# Patient Record
Sex: FEMALE | Race: WHITE | Employment: OTHER | URBAN - METROPOLITAN AREA
[De-identification: names, ages, dates, MRNs, and addresses within clinical notes are randomized per-mention and may not be internally consistent; named-entity substitution may affect disease eponyms.]

---

## 2017-01-23 RX ORDER — ATORVASTATIN CALCIUM 20 MG/1
TABLET, FILM COATED ORAL
Qty: 30 TABLET | Refills: 1 | Status: SHIPPED | OUTPATIENT
Start: 2017-01-23 | End: 2017-03-23 | Stop reason: SDUPTHER

## 2017-02-14 ENCOUNTER — TELEPHONE (OUTPATIENT)
Dept: FAMILY MEDICINE CLINIC | Age: 67
End: 2017-02-14

## 2017-02-14 DIAGNOSIS — Z00.00 ANNUAL PHYSICAL EXAM: Primary | ICD-10-CM

## 2017-03-10 DIAGNOSIS — Z00.00 ANNUAL PHYSICAL EXAM: ICD-10-CM

## 2017-03-10 LAB
A/G RATIO: 1.7 (ref 1.1–2.2)
ALBUMIN SERPL-MCNC: 4.3 G/DL (ref 3.4–5)
ALP BLD-CCNC: 78 U/L (ref 40–129)
ALT SERPL-CCNC: 48 U/L (ref 10–40)
ANION GAP SERPL CALCULATED.3IONS-SCNC: 16 MMOL/L (ref 3–16)
AST SERPL-CCNC: 31 U/L (ref 15–37)
BILIRUB SERPL-MCNC: 0.5 MG/DL (ref 0–1)
BILIRUBIN DIRECT: <0.2 MG/DL (ref 0–0.3)
BILIRUBIN, INDIRECT: ABNORMAL MG/DL (ref 0–1)
BUN BLDV-MCNC: 19 MG/DL (ref 7–20)
CALCIUM SERPL-MCNC: 9.9 MG/DL (ref 8.3–10.6)
CHLORIDE BLD-SCNC: 104 MMOL/L (ref 99–110)
CHOLESTEROL, TOTAL: 185 MG/DL (ref 0–199)
CO2: 22 MMOL/L (ref 21–32)
CREAT SERPL-MCNC: 0.8 MG/DL (ref 0.6–1.2)
GFR AFRICAN AMERICAN: >60
GFR NON-AFRICAN AMERICAN: >60
GLOBULIN: 2.5 G/DL
GLUCOSE BLD-MCNC: 87 MG/DL (ref 70–99)
HCT VFR BLD CALC: 45.2 % (ref 36–48)
HDLC SERPL-MCNC: 46 MG/DL (ref 40–60)
HEMOGLOBIN: 14.7 G/DL (ref 12–16)
LDL CHOLESTEROL CALCULATED: 110 MG/DL
MCH RBC QN AUTO: 29.9 PG (ref 26–34)
MCHC RBC AUTO-ENTMCNC: 32.5 G/DL (ref 31–36)
MCV RBC AUTO: 92.1 FL (ref 80–100)
PDW BLD-RTO: 15.3 % (ref 12.4–15.4)
PLATELET # BLD: 197 K/UL (ref 135–450)
PMV BLD AUTO: 10.1 FL (ref 5–10.5)
POTASSIUM SERPL-SCNC: 4.3 MMOL/L (ref 3.5–5.1)
RBC # BLD: 4.91 M/UL (ref 4–5.2)
SODIUM BLD-SCNC: 142 MMOL/L (ref 136–145)
TOTAL PROTEIN: 6.8 G/DL (ref 6.4–8.2)
TRIGL SERPL-MCNC: 145 MG/DL (ref 0–150)
TSH SERPL DL<=0.05 MIU/L-ACNC: 5.33 UIU/ML (ref 0.27–4.2)
VLDLC SERPL CALC-MCNC: 29 MG/DL
WBC # BLD: 7.2 K/UL (ref 4–11)

## 2017-03-23 ENCOUNTER — OFFICE VISIT (OUTPATIENT)
Dept: FAMILY MEDICINE CLINIC | Age: 67
End: 2017-03-23

## 2017-03-23 VITALS
HEIGHT: 64 IN | DIASTOLIC BLOOD PRESSURE: 78 MMHG | HEART RATE: 90 BPM | WEIGHT: 209 LBS | SYSTOLIC BLOOD PRESSURE: 110 MMHG | BODY MASS INDEX: 35.68 KG/M2 | OXYGEN SATURATION: 96 %

## 2017-03-23 DIAGNOSIS — Z00.00 MEDICARE ANNUAL WELLNESS VISIT, INITIAL: Primary | ICD-10-CM

## 2017-03-23 DIAGNOSIS — Z13.9 SCREENING: ICD-10-CM

## 2017-03-23 DIAGNOSIS — Z00.00 ROUTINE GENERAL MEDICAL EXAMINATION AT A HEALTH CARE FACILITY: ICD-10-CM

## 2017-03-23 PROCEDURE — G0402 INITIAL PREVENTIVE EXAM: HCPCS | Performed by: FAMILY MEDICINE

## 2017-03-23 RX ORDER — ZOLPIDEM TARTRATE 5 MG/1
5 TABLET ORAL NIGHTLY PRN
Qty: 30 TABLET | Refills: 2 | Status: SHIPPED | OUTPATIENT
Start: 2017-03-23 | End: 2020-07-09 | Stop reason: SDUPTHER

## 2017-03-23 RX ORDER — MELOXICAM 15 MG/1
15 TABLET ORAL DAILY
Qty: 30 TABLET | Refills: 3 | Status: SHIPPED | OUTPATIENT
Start: 2017-03-23 | End: 2019-01-14 | Stop reason: ALTCHOICE

## 2017-03-23 RX ORDER — ATORVASTATIN CALCIUM 20 MG/1
TABLET, FILM COATED ORAL
Qty: 30 TABLET | Refills: 1 | Status: SHIPPED | OUTPATIENT
Start: 2017-03-23 | End: 2017-05-11 | Stop reason: SDUPTHER

## 2017-03-23 ASSESSMENT — ENCOUNTER SYMPTOMS
CHEST TIGHTNESS: 0
WHEEZING: 0
SHORTNESS OF BREATH: 0
STRIDOR: 0
COUGH: 0
CHOKING: 0

## 2017-03-23 ASSESSMENT — PATIENT HEALTH QUESTIONNAIRE - PHQ9: SUM OF ALL RESPONSES TO PHQ QUESTIONS 1-9: 0

## 2017-03-23 ASSESSMENT — ANXIETY QUESTIONNAIRES: GAD7 TOTAL SCORE: 1

## 2017-03-23 ASSESSMENT — LIFESTYLE VARIABLES: HOW OFTEN DO YOU HAVE A DRINK CONTAINING ALCOHOL: 0

## 2017-05-11 DIAGNOSIS — Z00.00 MEDICARE ANNUAL WELLNESS VISIT, INITIAL: ICD-10-CM

## 2017-05-11 DIAGNOSIS — Z13.9 SCREENING: ICD-10-CM

## 2017-05-11 RX ORDER — ATORVASTATIN CALCIUM 20 MG/1
TABLET, FILM COATED ORAL
Qty: 30 TABLET | Refills: 0 | Status: SHIPPED | OUTPATIENT
Start: 2017-05-11 | End: 2017-06-27 | Stop reason: SDUPTHER

## 2017-06-27 DIAGNOSIS — Z13.9 SCREENING: ICD-10-CM

## 2017-06-27 DIAGNOSIS — Z00.00 MEDICARE ANNUAL WELLNESS VISIT, INITIAL: ICD-10-CM

## 2017-06-28 RX ORDER — ATORVASTATIN CALCIUM 20 MG/1
TABLET, FILM COATED ORAL
Qty: 30 TABLET | Refills: 0 | Status: SHIPPED | OUTPATIENT
Start: 2017-06-28 | End: 2017-08-29 | Stop reason: SDUPTHER

## 2017-06-30 ENCOUNTER — OFFICE VISIT (OUTPATIENT)
Dept: CARDIOLOGY CLINIC | Age: 67
End: 2017-06-30

## 2017-06-30 VITALS
HEIGHT: 64 IN | HEART RATE: 83 BPM | DIASTOLIC BLOOD PRESSURE: 80 MMHG | SYSTOLIC BLOOD PRESSURE: 122 MMHG | WEIGHT: 205 LBS | OXYGEN SATURATION: 96 % | BODY MASS INDEX: 35 KG/M2

## 2017-06-30 DIAGNOSIS — R53.83 OTHER FATIGUE: ICD-10-CM

## 2017-06-30 DIAGNOSIS — R94.31 ABNORMAL EKG: Primary | ICD-10-CM

## 2017-06-30 DIAGNOSIS — R07.9 CHEST PAIN, UNSPECIFIED TYPE: ICD-10-CM

## 2017-06-30 DIAGNOSIS — E78.5 HYPERLIPIDEMIA, UNSPECIFIED HYPERLIPIDEMIA TYPE: ICD-10-CM

## 2017-06-30 PROCEDURE — 93000 ELECTROCARDIOGRAM COMPLETE: CPT | Performed by: INTERNAL MEDICINE

## 2017-06-30 PROCEDURE — 99204 OFFICE O/P NEW MOD 45 MIN: CPT | Performed by: INTERNAL MEDICINE

## 2017-07-27 ENCOUNTER — HOSPITAL ENCOUNTER (OUTPATIENT)
Dept: CARDIOLOGY | Facility: CLINIC | Age: 67
Discharge: HOME | End: 2017-07-27
Attending: INTERNAL MEDICINE | Admitting: INTERNAL MEDICINE

## 2017-07-27 ENCOUNTER — TELEPHONE (OUTPATIENT)
Dept: CARDIOLOGY | Age: 67
End: 2017-07-27

## 2017-07-27 LAB
LV EF: 60 %
LV EF: 83 %
LVEF MODALITY: NORMAL
LVEF MODALITY: NORMAL

## 2017-08-28 DIAGNOSIS — Z00.00 MEDICARE ANNUAL WELLNESS VISIT, INITIAL: ICD-10-CM

## 2017-08-29 RX ORDER — ATORVASTATIN CALCIUM 20 MG/1
TABLET, FILM COATED ORAL
Qty: 30 TABLET | Refills: 0 | Status: SHIPPED | OUTPATIENT
Start: 2017-08-29 | End: 2017-09-22 | Stop reason: SDUPTHER

## 2017-09-22 DIAGNOSIS — Z00.00 MEDICARE ANNUAL WELLNESS VISIT, INITIAL: ICD-10-CM

## 2017-09-22 RX ORDER — ATORVASTATIN CALCIUM 20 MG/1
TABLET, FILM COATED ORAL
Qty: 30 TABLET | Refills: 0 | Status: SHIPPED | OUTPATIENT
Start: 2017-09-22 | End: 2017-10-20 | Stop reason: SDUPTHER

## 2017-10-20 DIAGNOSIS — Z00.00 MEDICARE ANNUAL WELLNESS VISIT, INITIAL: ICD-10-CM

## 2017-10-20 RX ORDER — ATORVASTATIN CALCIUM 20 MG/1
TABLET, FILM COATED ORAL
Qty: 30 TABLET | Refills: 0 | Status: SHIPPED | OUTPATIENT
Start: 2017-10-20 | End: 2017-11-30 | Stop reason: SDUPTHER

## 2017-11-30 DIAGNOSIS — Z00.00 MEDICARE ANNUAL WELLNESS VISIT, INITIAL: ICD-10-CM

## 2017-11-30 RX ORDER — ATORVASTATIN CALCIUM 20 MG/1
TABLET, FILM COATED ORAL
Qty: 30 TABLET | Refills: 0 | Status: SHIPPED | OUTPATIENT
Start: 2017-11-30 | End: 2017-12-26 | Stop reason: SDUPTHER

## 2017-12-26 DIAGNOSIS — Z00.00 MEDICARE ANNUAL WELLNESS VISIT, INITIAL: ICD-10-CM

## 2017-12-27 RX ORDER — ATORVASTATIN CALCIUM 20 MG/1
TABLET, FILM COATED ORAL
Qty: 30 TABLET | Refills: 0 | Status: SHIPPED | OUTPATIENT
Start: 2017-12-27 | End: 2018-04-03 | Stop reason: SDUPTHER

## 2018-02-19 ENCOUNTER — TELEPHONE (OUTPATIENT)
Dept: FAMILY MEDICINE CLINIC | Age: 68
End: 2018-02-19

## 2018-02-19 DIAGNOSIS — Z00.00 MEDICARE ANNUAL WELLNESS VISIT, SUBSEQUENT: Primary | ICD-10-CM

## 2018-03-27 DIAGNOSIS — Z00.00 MEDICARE ANNUAL WELLNESS VISIT, SUBSEQUENT: ICD-10-CM

## 2018-03-27 LAB
A/G RATIO: 1.8 (ref 1.1–2.2)
ALBUMIN SERPL-MCNC: 4.4 G/DL (ref 3.4–5)
ALP BLD-CCNC: 76 U/L (ref 40–129)
ALT SERPL-CCNC: 47 U/L (ref 10–40)
ANION GAP SERPL CALCULATED.3IONS-SCNC: 13 MMOL/L (ref 3–16)
AST SERPL-CCNC: 33 U/L (ref 15–37)
BILIRUB SERPL-MCNC: 0.5 MG/DL (ref 0–1)
BILIRUBIN DIRECT: <0.2 MG/DL (ref 0–0.3)
BILIRUBIN, INDIRECT: ABNORMAL MG/DL (ref 0–1)
BUN BLDV-MCNC: 15 MG/DL (ref 7–20)
CALCIUM SERPL-MCNC: 9.2 MG/DL (ref 8.3–10.6)
CHLORIDE BLD-SCNC: 103 MMOL/L (ref 99–110)
CHOLESTEROL, TOTAL: 182 MG/DL (ref 0–199)
CO2: 25 MMOL/L (ref 21–32)
CREAT SERPL-MCNC: 0.7 MG/DL (ref 0.6–1.2)
GFR AFRICAN AMERICAN: >60
GFR NON-AFRICAN AMERICAN: >60
GLOBULIN: 2.4 G/DL
GLUCOSE BLD-MCNC: 91 MG/DL (ref 70–99)
HCT VFR BLD CALC: 45 % (ref 36–48)
HDLC SERPL-MCNC: 52 MG/DL (ref 40–60)
HEMOGLOBIN: 15.1 G/DL (ref 12–16)
LDL CHOLESTEROL CALCULATED: 107 MG/DL
MCH RBC QN AUTO: 30.4 PG (ref 26–34)
MCHC RBC AUTO-ENTMCNC: 33.5 G/DL (ref 31–36)
MCV RBC AUTO: 90.9 FL (ref 80–100)
PDW BLD-RTO: 14.2 % (ref 12.4–15.4)
PLATELET # BLD: 199 K/UL (ref 135–450)
PMV BLD AUTO: 10.9 FL (ref 5–10.5)
POTASSIUM SERPL-SCNC: 4.5 MMOL/L (ref 3.5–5.1)
RBC # BLD: 4.95 M/UL (ref 4–5.2)
SODIUM BLD-SCNC: 141 MMOL/L (ref 136–145)
TOTAL PROTEIN: 6.8 G/DL (ref 6.4–8.2)
TRIGL SERPL-MCNC: 115 MG/DL (ref 0–150)
TSH SERPL DL<=0.05 MIU/L-ACNC: 5.84 UIU/ML (ref 0.27–4.2)
VLDLC SERPL CALC-MCNC: 23 MG/DL
WBC # BLD: 7.2 K/UL (ref 4–11)

## 2018-04-03 ENCOUNTER — OFFICE VISIT (OUTPATIENT)
Dept: FAMILY MEDICINE CLINIC | Age: 68
End: 2018-04-03

## 2018-04-03 VITALS
BODY MASS INDEX: 33.8 KG/M2 | OXYGEN SATURATION: 95 % | HEART RATE: 104 BPM | SYSTOLIC BLOOD PRESSURE: 118 MMHG | DIASTOLIC BLOOD PRESSURE: 78 MMHG | HEIGHT: 64 IN | WEIGHT: 198 LBS

## 2018-04-03 DIAGNOSIS — Z12.11 SCREENING FOR COLON CANCER: Primary | ICD-10-CM

## 2018-04-03 DIAGNOSIS — Z00.00 MEDICARE ANNUAL WELLNESS VISIT, INITIAL: ICD-10-CM

## 2018-04-03 DIAGNOSIS — Z00.00 ROUTINE GENERAL MEDICAL EXAMINATION AT A HEALTH CARE FACILITY: ICD-10-CM

## 2018-04-03 PROCEDURE — 4040F PNEUMOC VAC/ADMIN/RCVD: CPT | Performed by: FAMILY MEDICINE

## 2018-04-03 PROCEDURE — G0438 PPPS, INITIAL VISIT: HCPCS | Performed by: FAMILY MEDICINE

## 2018-04-03 RX ORDER — CHLORAL HYDRATE 500 MG
1000 CAPSULE ORAL 2 TIMES DAILY
COMMUNITY
End: 2020-01-07

## 2018-04-03 RX ORDER — ATORVASTATIN CALCIUM 20 MG/1
TABLET, FILM COATED ORAL
Qty: 90 TABLET | Refills: 2 | Status: SHIPPED | OUTPATIENT
Start: 2018-04-03 | End: 2018-12-25 | Stop reason: SDUPTHER

## 2018-04-03 ASSESSMENT — ANXIETY QUESTIONNAIRES: GAD7 TOTAL SCORE: 0

## 2018-04-03 ASSESSMENT — PATIENT HEALTH QUESTIONNAIRE - PHQ9: SUM OF ALL RESPONSES TO PHQ QUESTIONS 1-9: 0

## 2018-04-03 ASSESSMENT — LIFESTYLE VARIABLES: HOW OFTEN DO YOU HAVE A DRINK CONTAINING ALCOHOL: 0

## 2018-07-24 ENCOUNTER — TELEPHONE (OUTPATIENT)
Dept: FAMILY MEDICINE CLINIC | Age: 68
End: 2018-07-24

## 2018-07-25 ENCOUNTER — TELEPHONE (OUTPATIENT)
Dept: FAMILY MEDICINE CLINIC | Age: 68
End: 2018-07-25

## 2018-07-25 DIAGNOSIS — H43.399 VITREOUS FLOATERS, UNSPECIFIED LATERALITY: Primary | ICD-10-CM

## 2018-07-25 NOTE — TELEPHONE ENCOUNTER
Dr. Alexander Client is out of network. Need a different doctor that is in network.   [address] [facility]  Reference # [id number]    Mai Doshi  [phone]  Dr. Ammon Goodman  [phone]  Dr. Jelly Angulo [phone]

## 2018-07-26 NOTE — TELEPHONE ENCOUNTER
The patients insurance company will not pay for her to see Dr Dominik Mclaughlin because he is out of network. They will cover the other three doctors that are listed. Which one would you like patient to see?

## 2018-07-30 NOTE — TELEPHONE ENCOUNTER
I talked to Dr. Clinton Dears office with [facility]. Sofía Zaldivar has already been seen by Dr. Alyssa Meraz and according to their office she was all up to date as far as payment is concerned. She does have an appointment with him on 8/1/18 to have her eyes dilated. I let their office know that we tried to do a referral for that doctor with her insurance, and according to them Dr. Alyssa Meraz is out of network. The office told me that they will contact the patient.

## 2018-09-26 PROBLEM — Z00.00 MEDICARE ANNUAL WELLNESS VISIT, INITIAL: Status: RESOLVED | Noted: 2017-03-23 | Resolved: 2018-09-26

## 2018-12-25 DIAGNOSIS — Z00.00 MEDICARE ANNUAL WELLNESS VISIT, INITIAL: ICD-10-CM

## 2018-12-26 RX ORDER — ATORVASTATIN CALCIUM 20 MG/1
TABLET, FILM COATED ORAL
Qty: 90 TABLET | Refills: 1 | Status: SHIPPED | OUTPATIENT
Start: 2018-12-26 | End: 2019-03-25 | Stop reason: SDUPTHER

## 2019-01-14 ENCOUNTER — OFFICE VISIT (OUTPATIENT)
Dept: FAMILY MEDICINE CLINIC | Age: 69
End: 2019-01-14
Payer: MEDICARE

## 2019-01-14 VITALS
OXYGEN SATURATION: 97 % | DIASTOLIC BLOOD PRESSURE: 86 MMHG | BODY MASS INDEX: 30.9 KG/M2 | HEIGHT: 64 IN | SYSTOLIC BLOOD PRESSURE: 124 MMHG | HEART RATE: 103 BPM | WEIGHT: 181 LBS

## 2019-01-14 DIAGNOSIS — J40 BRONCHITIS: Primary | ICD-10-CM

## 2019-01-14 DIAGNOSIS — H61.23 BILATERAL IMPACTED CERUMEN: ICD-10-CM

## 2019-01-14 PROCEDURE — 99213 OFFICE O/P EST LOW 20 MIN: CPT | Performed by: NURSE PRACTITIONER

## 2019-01-14 RX ORDER — BENZONATATE 100 MG/1
100 CAPSULE ORAL 3 TIMES DAILY PRN
Qty: 30 CAPSULE | Refills: 0 | Status: SHIPPED | OUTPATIENT
Start: 2019-01-14 | End: 2019-01-21

## 2019-01-14 RX ORDER — METHYLPREDNISOLONE 4 MG/1
TABLET ORAL
Qty: 1 KIT | Refills: 0 | Status: SHIPPED | OUTPATIENT
Start: 2019-01-14 | End: 2019-01-14 | Stop reason: SDUPTHER

## 2019-01-14 RX ORDER — AZITHROMYCIN 250 MG/1
250 TABLET, FILM COATED ORAL DAILY
Qty: 6 TABLET | Refills: 0 | Status: SHIPPED | OUTPATIENT
Start: 2019-01-14 | End: 2019-03-11

## 2019-01-14 RX ORDER — METHYLPREDNISOLONE 4 MG/1
TABLET ORAL
Qty: 1 KIT | Refills: 0 | Status: SHIPPED | OUTPATIENT
Start: 2019-01-14 | End: 2019-03-11

## 2019-01-14 ASSESSMENT — ENCOUNTER SYMPTOMS
APNEA: 0
CHEST TIGHTNESS: 0
CHOKING: 0
SINUS PAIN: 1
RHINORRHEA: 1
COUGH: 1
SHORTNESS OF BREATH: 1
WHEEZING: 0
STRIDOR: 0

## 2019-01-23 ENCOUNTER — ANESTHESIA EVENT (OUTPATIENT)
Dept: ENDOSCOPY | Age: 69
End: 2019-01-23
Payer: MEDICARE

## 2019-01-24 ENCOUNTER — ANESTHESIA (OUTPATIENT)
Dept: ENDOSCOPY | Age: 69
End: 2019-01-24
Payer: MEDICARE

## 2019-01-24 ENCOUNTER — HOSPITAL ENCOUNTER (OUTPATIENT)
Age: 69
Setting detail: OUTPATIENT SURGERY
Discharge: HOME | End: 2019-01-24
Attending: INTERNAL MEDICINE | Admitting: INTERNAL MEDICINE
Payer: MEDICARE

## 2019-01-24 VITALS
RESPIRATION RATE: 16 BRPM | OXYGEN SATURATION: 98 % | WEIGHT: 191 LBS | BODY MASS INDEX: 32.61 KG/M2 | SYSTOLIC BLOOD PRESSURE: 124 MMHG | HEART RATE: 82 BPM | TEMPERATURE: 97.4 F | HEIGHT: 64 IN | DIASTOLIC BLOOD PRESSURE: 74 MMHG

## 2019-01-24 VITALS
RESPIRATION RATE: 25 BRPM | DIASTOLIC BLOOD PRESSURE: 84 MMHG | OXYGEN SATURATION: 94 % | SYSTOLIC BLOOD PRESSURE: 124 MMHG

## 2019-01-24 PROCEDURE — 2500000003 HC RX 250 WO HCPCS: Performed by: NURSE ANESTHETIST, CERTIFIED REGISTERED

## 2019-01-24 PROCEDURE — 3700000001 HC ADD 15 MINUTES (ANESTHESIA): Performed by: INTERNAL MEDICINE

## 2019-01-24 PROCEDURE — 6360000002 HC RX W HCPCS: Performed by: NURSE ANESTHETIST, CERTIFIED REGISTERED

## 2019-01-24 PROCEDURE — 3700000000 HC ANESTHESIA ATTENDED CARE: Performed by: INTERNAL MEDICINE

## 2019-01-24 PROCEDURE — 7100000010 HC PHASE II RECOVERY - FIRST 15 MIN: Performed by: INTERNAL MEDICINE

## 2019-01-24 PROCEDURE — 2580000003 HC RX 258: Performed by: INTERNAL MEDICINE

## 2019-01-24 PROCEDURE — 3609027000 HC COLONOSCOPY: Performed by: INTERNAL MEDICINE

## 2019-01-24 PROCEDURE — 7100000011 HC PHASE II RECOVERY - ADDTL 15 MIN: Performed by: INTERNAL MEDICINE

## 2019-01-24 PROCEDURE — 2709999900 HC NON-CHARGEABLE SUPPLY: Performed by: INTERNAL MEDICINE

## 2019-01-24 RX ORDER — LIDOCAINE HYDROCHLORIDE 20 MG/ML
INJECTION, SOLUTION INFILTRATION; PERINEURAL PRN
Status: DISCONTINUED | OUTPATIENT
Start: 2019-01-24 | End: 2019-01-24 | Stop reason: SDUPTHER

## 2019-01-24 RX ORDER — PROPOFOL 10 MG/ML
INJECTION, EMULSION INTRAVENOUS PRN
Status: DISCONTINUED | OUTPATIENT
Start: 2019-01-24 | End: 2019-01-24 | Stop reason: SDUPTHER

## 2019-01-24 RX ORDER — SODIUM CHLORIDE, SODIUM LACTATE, POTASSIUM CHLORIDE, CALCIUM CHLORIDE 600; 310; 30; 20 MG/100ML; MG/100ML; MG/100ML; MG/100ML
INJECTION, SOLUTION INTRAVENOUS CONTINUOUS
Status: DISCONTINUED | OUTPATIENT
Start: 2019-01-24 | End: 2019-01-24 | Stop reason: HOSPADM

## 2019-01-24 RX ADMIN — PROPOFOL 250 MG: 10 INJECTION, EMULSION INTRAVENOUS at 09:22

## 2019-01-24 RX ADMIN — SODIUM CHLORIDE, POTASSIUM CHLORIDE, SODIUM LACTATE AND CALCIUM CHLORIDE: 600; 310; 30; 20 INJECTION, SOLUTION INTRAVENOUS at 09:20

## 2019-01-24 RX ADMIN — LIDOCAINE HYDROCHLORIDE 40 MG: 20 INJECTION, SOLUTION INFILTRATION; PERINEURAL at 09:22

## 2019-01-24 ASSESSMENT — PAIN - FUNCTIONAL ASSESSMENT: PAIN_FUNCTIONAL_ASSESSMENT: 0-10

## 2019-02-12 ENCOUNTER — HOSPITAL ENCOUNTER (OUTPATIENT)
Dept: WOMENS IMAGING | Age: 69
Discharge: HOME | End: 2019-02-12
Payer: MEDICARE

## 2019-02-12 DIAGNOSIS — Z12.31 ENCOUNTER FOR SCREENING MAMMOGRAM FOR MALIGNANT NEOPLASM OF BREAST: ICD-10-CM

## 2019-02-12 PROCEDURE — 77067 SCR MAMMO BI INCL CAD: CPT

## 2019-02-27 ENCOUNTER — TELEPHONE (OUTPATIENT)
Dept: FAMILY MEDICINE CLINIC | Age: 69
End: 2019-02-27

## 2019-02-27 DIAGNOSIS — E78.5 HYPERLIPIDEMIA, UNSPECIFIED HYPERLIPIDEMIA TYPE: Primary | ICD-10-CM

## 2019-03-11 ENCOUNTER — OFFICE VISIT (OUTPATIENT)
Dept: FAMILY MEDICINE CLINIC | Age: 69
End: 2019-03-11
Payer: MEDICARE

## 2019-03-11 VITALS
BODY MASS INDEX: 33.46 KG/M2 | TEMPERATURE: 98.9 F | HEIGHT: 64 IN | SYSTOLIC BLOOD PRESSURE: 134 MMHG | HEART RATE: 116 BPM | OXYGEN SATURATION: 96 % | WEIGHT: 196 LBS | DIASTOLIC BLOOD PRESSURE: 86 MMHG

## 2019-03-11 DIAGNOSIS — J06.9 VIRAL URI: Primary | ICD-10-CM

## 2019-03-11 PROCEDURE — 99213 OFFICE O/P EST LOW 20 MIN: CPT | Performed by: NURSE PRACTITIONER

## 2019-03-11 PROCEDURE — 1036F TOBACCO NON-USER: CPT | Performed by: NURSE PRACTITIONER

## 2019-03-11 PROCEDURE — G8484 FLU IMMUNIZE NO ADMIN: HCPCS | Performed by: NURSE PRACTITIONER

## 2019-03-11 PROCEDURE — 1101F PT FALLS ASSESS-DOCD LE1/YR: CPT | Performed by: NURSE PRACTITIONER

## 2019-03-11 PROCEDURE — G8417 CALC BMI ABV UP PARAM F/U: HCPCS | Performed by: NURSE PRACTITIONER

## 2019-03-11 PROCEDURE — 3017F COLORECTAL CA SCREEN DOC REV: CPT | Performed by: NURSE PRACTITIONER

## 2019-03-11 PROCEDURE — 1090F PRES/ABSN URINE INCON ASSESS: CPT | Performed by: NURSE PRACTITIONER

## 2019-03-11 PROCEDURE — 4040F PNEUMOC VAC/ADMIN/RCVD: CPT | Performed by: NURSE PRACTITIONER

## 2019-03-11 PROCEDURE — 1123F ACP DISCUSS/DSCN MKR DOCD: CPT | Performed by: NURSE PRACTITIONER

## 2019-03-11 PROCEDURE — G8399 PT W/DXA RESULTS DOCUMENT: HCPCS | Performed by: NURSE PRACTITIONER

## 2019-03-11 PROCEDURE — G8427 DOCREV CUR MEDS BY ELIG CLIN: HCPCS | Performed by: NURSE PRACTITIONER

## 2019-03-11 RX ORDER — BENZONATATE 100 MG/1
100 CAPSULE ORAL 3 TIMES DAILY PRN
Qty: 30 CAPSULE | Refills: 0 | Status: SHIPPED | OUTPATIENT
Start: 2019-03-11 | End: 2019-03-18

## 2019-03-11 ASSESSMENT — ENCOUNTER SYMPTOMS
COUGH: 1
NAUSEA: 0
STRIDOR: 0
SINUS PRESSURE: 1
CHEST TIGHTNESS: 1
SHORTNESS OF BREATH: 0
RHINORRHEA: 0
WHEEZING: 0
DIARRHEA: 0
VOMITING: 0
SINUS PAIN: 1
SORE THROAT: 1
TROUBLE SWALLOWING: 0

## 2019-03-11 ASSESSMENT — PATIENT HEALTH QUESTIONNAIRE - PHQ9
SUM OF ALL RESPONSES TO PHQ QUESTIONS 1-9: 0
SUM OF ALL RESPONSES TO PHQ9 QUESTIONS 1 & 2: 0
SUM OF ALL RESPONSES TO PHQ QUESTIONS 1-9: 0
2. FEELING DOWN, DEPRESSED OR HOPELESS: 0
1. LITTLE INTEREST OR PLEASURE IN DOING THINGS: 0

## 2019-03-25 DIAGNOSIS — Z00.00 MEDICARE ANNUAL WELLNESS VISIT, INITIAL: ICD-10-CM

## 2019-03-25 RX ORDER — ATORVASTATIN CALCIUM 20 MG/1
TABLET, FILM COATED ORAL
Qty: 90 TABLET | Refills: 1 | Status: SHIPPED | OUTPATIENT
Start: 2019-03-25 | End: 2019-04-04 | Stop reason: SDUPTHER

## 2019-04-03 DIAGNOSIS — E78.5 HYPERLIPIDEMIA, UNSPECIFIED HYPERLIPIDEMIA TYPE: ICD-10-CM

## 2019-04-03 DIAGNOSIS — R89.9 ABNORMAL LABORATORY TEST RESULT: Primary | ICD-10-CM

## 2019-04-03 LAB
A/G RATIO: 1.7 (ref 1.1–2.2)
ALBUMIN SERPL-MCNC: 4.5 G/DL (ref 3.4–5)
ALP BLD-CCNC: 80 U/L (ref 40–129)
ALT SERPL-CCNC: 71 U/L (ref 10–40)
ANION GAP SERPL CALCULATED.3IONS-SCNC: 13 MMOL/L (ref 3–16)
AST SERPL-CCNC: 45 U/L (ref 15–37)
BILIRUB SERPL-MCNC: 0.5 MG/DL (ref 0–1)
BILIRUBIN DIRECT: <0.2 MG/DL (ref 0–0.3)
BILIRUBIN, INDIRECT: ABNORMAL MG/DL (ref 0–1)
BUN BLDV-MCNC: 16 MG/DL (ref 7–20)
CALCIUM SERPL-MCNC: 9.7 MG/DL (ref 8.3–10.6)
CHLORIDE BLD-SCNC: 107 MMOL/L (ref 99–110)
CHOLESTEROL, TOTAL: 170 MG/DL (ref 0–199)
CO2: 24 MMOL/L (ref 21–32)
CREAT SERPL-MCNC: 0.7 MG/DL (ref 0.6–1.2)
GFR AFRICAN AMERICAN: >60
GFR NON-AFRICAN AMERICAN: >60
GLOBULIN: 2.6 G/DL
GLUCOSE BLD-MCNC: 102 MG/DL (ref 70–99)
HCT VFR BLD CALC: 44.1 % (ref 36–48)
HDLC SERPL-MCNC: 43 MG/DL (ref 40–60)
HEMOGLOBIN: 14.6 G/DL (ref 12–16)
LDL CHOLESTEROL CALCULATED: 106 MG/DL
MCH RBC QN AUTO: 30.3 PG (ref 26–34)
MCHC RBC AUTO-ENTMCNC: 33.1 G/DL (ref 31–36)
MCV RBC AUTO: 91.3 FL (ref 80–100)
PDW BLD-RTO: 14.3 % (ref 12.4–15.4)
PLATELET # BLD: 198 K/UL (ref 135–450)
PMV BLD AUTO: 10.3 FL (ref 5–10.5)
POTASSIUM SERPL-SCNC: 4.5 MMOL/L (ref 3.5–5.1)
RBC # BLD: 4.83 M/UL (ref 4–5.2)
SODIUM BLD-SCNC: 144 MMOL/L (ref 136–145)
TOTAL PROTEIN: 7.1 G/DL (ref 6.4–8.2)
TRIGL SERPL-MCNC: 107 MG/DL (ref 0–150)
TSH SERPL DL<=0.05 MIU/L-ACNC: 4.83 UIU/ML (ref 0.27–4.2)
VLDLC SERPL CALC-MCNC: 21 MG/DL
WBC # BLD: 5.3 K/UL (ref 4–11)

## 2019-04-04 ENCOUNTER — OFFICE VISIT (OUTPATIENT)
Dept: FAMILY MEDICINE CLINIC | Age: 69
End: 2019-04-04
Payer: MEDICARE

## 2019-04-04 VITALS
HEART RATE: 94 BPM | BODY MASS INDEX: 34.15 KG/M2 | DIASTOLIC BLOOD PRESSURE: 86 MMHG | HEIGHT: 64 IN | OXYGEN SATURATION: 94 % | WEIGHT: 200 LBS | SYSTOLIC BLOOD PRESSURE: 136 MMHG

## 2019-04-04 DIAGNOSIS — R74.8 ELEVATED LIVER ENZYMES: ICD-10-CM

## 2019-04-04 DIAGNOSIS — R10.32 LEFT LOWER QUADRANT PAIN: ICD-10-CM

## 2019-04-04 DIAGNOSIS — Z00.00 MEDICARE ANNUAL WELLNESS VISIT, INITIAL: Primary | ICD-10-CM

## 2019-04-04 DIAGNOSIS — E78.5 HYPERLIPIDEMIA, UNSPECIFIED HYPERLIPIDEMIA TYPE: ICD-10-CM

## 2019-04-04 PROCEDURE — G0439 PPPS, SUBSEQ VISIT: HCPCS | Performed by: FAMILY MEDICINE

## 2019-04-04 PROCEDURE — 4040F PNEUMOC VAC/ADMIN/RCVD: CPT | Performed by: FAMILY MEDICINE

## 2019-04-04 RX ORDER — ATORVASTATIN CALCIUM 20 MG/1
TABLET, FILM COATED ORAL
Qty: 90 TABLET | Refills: 1 | Status: SHIPPED | OUTPATIENT
Start: 2019-04-04 | End: 2020-12-21 | Stop reason: CLARIF

## 2019-04-04 ASSESSMENT — ANXIETY QUESTIONNAIRES: GAD7 TOTAL SCORE: 0

## 2019-04-04 ASSESSMENT — ENCOUNTER SYMPTOMS
ABDOMINAL PAIN: 1
WHEEZING: 0
SHORTNESS OF BREATH: 0
CHEST TIGHTNESS: 0
CHOKING: 0
COUGH: 0
STRIDOR: 0
BACK PAIN: 0

## 2019-04-04 ASSESSMENT — PATIENT HEALTH QUESTIONNAIRE - PHQ9
SUM OF ALL RESPONSES TO PHQ QUESTIONS 1-9: 0
1. LITTLE INTEREST OR PLEASURE IN DOING THINGS: 0
2. FEELING DOWN, DEPRESSED OR HOPELESS: 0
SUM OF ALL RESPONSES TO PHQ QUESTIONS 1-9: 0
SUM OF ALL RESPONSES TO PHQ9 QUESTIONS 1 & 2: 0
DEPRESSION UNABLE TO ASSESS: PT REFUSES

## 2019-04-04 ASSESSMENT — LIFESTYLE VARIABLES: HOW OFTEN DO YOU HAVE A DRINK CONTAINING ALCOHOL: 0

## 2019-04-04 NOTE — PROGRESS NOTES
exhibits no distension and no mass. There is no tenderness. There is no rebound and no guarding. Musculoskeletal: Normal range of motion. She exhibits no edema or tenderness. Lymphadenopathy:     She has no cervical adenopathy. Neurological: She is alert and oriented to person, place, and time. She has normal reflexes. She displays normal reflexes. No cranial nerve deficit. She exhibits normal muscle tone. Coordination normal.   Skin: Skin is warm and dry. No rash noted. No erythema. No pallor. Psychiatric: She has a normal mood and affect. Nursing note and vitals reviewed. Assessment/Plan      1. Medicare annual wellness visit, initial    - atorvastatin (LIPITOR) 20 MG tablet; Take 1 qd  Dispense: 90 tablet; Refill: 1    2. Hyperlipidemia, unspecified hyperlipidemia type-continue Lipitor      3. Elevated liver enzymes-repeat in one month    - Hepatic Function Panel; Future    4. Left lower quadrant pain-ordered CT of abdomen and pelvis    - CT ABDOMEN PELVIS W WO CONTRAST;  Future        Flemarbella Singer, DO

## 2019-04-16 ENCOUNTER — HOSPITAL ENCOUNTER (OUTPATIENT)
Dept: CT IMAGING | Age: 69
Discharge: HOME | End: 2019-04-16
Payer: MEDICARE

## 2019-04-16 ENCOUNTER — HOSPITAL ENCOUNTER (OUTPATIENT)
Age: 69
Discharge: HOME | End: 2019-04-16
Payer: MEDICARE

## 2019-04-16 DIAGNOSIS — R74.8 ELEVATED LIVER ENZYMES: ICD-10-CM

## 2019-04-16 DIAGNOSIS — R10.32 LEFT LOWER QUADRANT PAIN: ICD-10-CM

## 2019-04-16 LAB
ALBUMIN SERPL-MCNC: 4.2 G/DL (ref 3.4–5)
ALP BLD-CCNC: 73 U/L (ref 40–129)
ALT SERPL-CCNC: 54 U/L (ref 10–40)
AST SERPL-CCNC: 40 U/L (ref 15–37)
BILIRUB SERPL-MCNC: 0.5 MG/DL (ref 0–1)
BILIRUBIN DIRECT: <0.2 MG/DL (ref 0–0.3)
BILIRUBIN, INDIRECT: ABNORMAL MG/DL (ref 0–1)
TOTAL PROTEIN: 7.3 G/DL (ref 6.4–8.2)

## 2019-04-16 PROCEDURE — 6360000004 HC RX CONTRAST MEDICATION: Performed by: FAMILY MEDICINE

## 2019-04-16 PROCEDURE — 80076 HEPATIC FUNCTION PANEL: CPT

## 2019-04-16 PROCEDURE — 74176 CT ABD & PELVIS W/O CONTRAST: CPT

## 2019-04-16 PROCEDURE — 36415 COLL VENOUS BLD VENIPUNCTURE: CPT

## 2019-04-16 RX ADMIN — IOHEXOL 50 ML: 240 INJECTION, SOLUTION INTRATHECAL; INTRAVASCULAR; INTRAVENOUS; ORAL at 10:06

## 2019-05-04 PROBLEM — Z00.00 MEDICARE ANNUAL WELLNESS VISIT, INITIAL: Status: RESOLVED | Noted: 2017-03-23 | Resolved: 2019-05-04

## 2019-05-08 DIAGNOSIS — R89.9 ABNORMAL LABORATORY TEST RESULT: ICD-10-CM

## 2019-05-08 LAB
A/G RATIO: 1.8 (ref 1.1–2.2)
ALBUMIN SERPL-MCNC: 4.5 G/DL (ref 3.4–5)
ALBUMIN SERPL-MCNC: 4.6 G/DL (ref 3.4–5)
ALP BLD-CCNC: 71 U/L (ref 40–129)
ALP BLD-CCNC: 74 U/L (ref 40–129)
ALT SERPL-CCNC: 62 U/L (ref 10–40)
ALT SERPL-CCNC: 64 U/L (ref 10–40)
ANION GAP SERPL CALCULATED.3IONS-SCNC: 14 MMOL/L (ref 3–16)
AST SERPL-CCNC: 48 U/L (ref 15–37)
AST SERPL-CCNC: 49 U/L (ref 15–37)
BILIRUB SERPL-MCNC: 0.5 MG/DL (ref 0–1)
BILIRUB SERPL-MCNC: 0.6 MG/DL (ref 0–1)
BILIRUBIN DIRECT: <0.2 MG/DL (ref 0–0.3)
BILIRUBIN, INDIRECT: ABNORMAL MG/DL (ref 0–1)
BUN BLDV-MCNC: 15 MG/DL (ref 7–20)
CALCIUM SERPL-MCNC: 9.7 MG/DL (ref 8.3–10.6)
CHLORIDE BLD-SCNC: 104 MMOL/L (ref 99–110)
CO2: 24 MMOL/L (ref 21–32)
CREAT SERPL-MCNC: 0.7 MG/DL (ref 0.6–1.2)
GFR AFRICAN AMERICAN: >60
GFR NON-AFRICAN AMERICAN: >60
GLOBULIN: 2.6 G/DL
GLUCOSE BLD-MCNC: 94 MG/DL (ref 70–99)
POTASSIUM SERPL-SCNC: 4.4 MMOL/L (ref 3.5–5.1)
SODIUM BLD-SCNC: 142 MMOL/L (ref 136–145)
TOTAL PROTEIN: 7.2 G/DL (ref 6.4–8.2)
TOTAL PROTEIN: 7.4 G/DL (ref 6.4–8.2)

## 2019-05-09 LAB — TSH SERPL DL<=0.05 MIU/L-ACNC: 5.37 UIU/ML (ref 0.27–4.2)

## 2019-05-20 ENCOUNTER — TELEPHONE (OUTPATIENT)
Dept: FAMILY MEDICINE CLINIC | Age: 69
End: 2019-05-20

## 2019-05-20 DIAGNOSIS — R74.8 ELEVATED LIVER ENZYMES: Primary | ICD-10-CM

## 2019-05-20 NOTE — TELEPHONE ENCOUNTER
I talked to Lexx Bearden. Her liver enzymes remained elevated even a little more than last month. I ask her to discontinue Lipitor for one month and then to repeat a liver and lipid panel. I will place order.

## 2019-05-20 NOTE — TELEPHONE ENCOUNTER
Pt's  called wanting the test results of the lab that was drawn on 05/08/2019.  Please call the Wife with the results

## 2019-06-14 ENCOUNTER — TELEPHONE (OUTPATIENT)
Dept: FAMILY MEDICINE CLINIC | Age: 69
End: 2019-06-14

## 2019-06-14 NOTE — TELEPHONE ENCOUNTER
Please contact Barbie and remind her to get her repeat liver panel done. This is to follow-up on elevated liver enzymes. The order is already in the system.

## 2019-06-21 DIAGNOSIS — R74.8 ELEVATED LIVER ENZYMES: ICD-10-CM

## 2019-06-21 DIAGNOSIS — E78.5 HYPERLIPIDEMIA, UNSPECIFIED HYPERLIPIDEMIA TYPE: Primary | ICD-10-CM

## 2019-06-21 LAB
ALBUMIN SERPL-MCNC: 4.6 G/DL (ref 3.4–5)
ALP BLD-CCNC: 69 U/L (ref 40–129)
ALT SERPL-CCNC: 67 U/L (ref 10–40)
AST SERPL-CCNC: 43 U/L (ref 15–37)
BILIRUB SERPL-MCNC: 0.5 MG/DL (ref 0–1)
BILIRUBIN DIRECT: <0.2 MG/DL (ref 0–0.3)
BILIRUBIN, INDIRECT: ABNORMAL MG/DL (ref 0–1)
CHOLESTEROL, TOTAL: 257 MG/DL (ref 0–199)
HDLC SERPL-MCNC: 52 MG/DL (ref 40–60)
LDL CHOLESTEROL CALCULATED: 169 MG/DL
TOTAL PROTEIN: 7.2 G/DL (ref 6.4–8.2)
TRIGL SERPL-MCNC: 179 MG/DL (ref 0–150)
VLDLC SERPL CALC-MCNC: 36 MG/DL

## 2019-06-25 ENCOUNTER — TELEPHONE (OUTPATIENT)
Dept: FAMILY MEDICINE CLINIC | Age: 69
End: 2019-06-25

## 2019-06-25 DIAGNOSIS — R74.8 ELEVATED LIVER ENZYMES: Primary | ICD-10-CM

## 2019-06-25 DIAGNOSIS — E78.5 HYPERLIPIDEMIA, UNSPECIFIED HYPERLIPIDEMIA TYPE: ICD-10-CM

## 2019-06-25 RX ORDER — PRAVASTATIN SODIUM 10 MG
10 TABLET ORAL DAILY
Qty: 30 TABLET | Refills: 3 | Status: SHIPPED | OUTPATIENT
Start: 2019-06-25 | End: 2019-08-20 | Stop reason: SDUPTHER

## 2019-06-25 NOTE — TELEPHONE ENCOUNTER
I talked to Clarissa Alberts about her liver enzyme elevations which persists despite the fact that she has stopped Lipitor. I referred her to Dr. Mat Chairez for gastroenterology consultation. I sent her in a prescription for Pravachol 10 and ask her to get a new liver and lipid panel after 6 weeks on the Pravachol. She agreed.

## 2019-08-12 ENCOUNTER — TELEPHONE (OUTPATIENT)
Dept: FAMILY MEDICINE CLINIC | Age: 69
End: 2019-08-12

## 2019-08-13 DIAGNOSIS — R74.8 ELEVATED LIVER ENZYMES: ICD-10-CM

## 2019-08-13 DIAGNOSIS — E78.5 HYPERLIPIDEMIA, UNSPECIFIED HYPERLIPIDEMIA TYPE: ICD-10-CM

## 2019-08-13 LAB
ALBUMIN SERPL-MCNC: 4.5 G/DL (ref 3.4–5)
ALP BLD-CCNC: 68 U/L (ref 40–129)
ALT SERPL-CCNC: 72 U/L (ref 10–40)
AST SERPL-CCNC: 49 U/L (ref 15–37)
BILIRUB SERPL-MCNC: 0.5 MG/DL (ref 0–1)
BILIRUBIN DIRECT: <0.2 MG/DL (ref 0–0.3)
BILIRUBIN, INDIRECT: ABNORMAL MG/DL (ref 0–1)
CHOLESTEROL, TOTAL: 212 MG/DL (ref 0–199)
HDLC SERPL-MCNC: 52 MG/DL (ref 40–60)
LDL CHOLESTEROL CALCULATED: 139 MG/DL
TOTAL PROTEIN: 7 G/DL (ref 6.4–8.2)
TRIGL SERPL-MCNC: 107 MG/DL (ref 0–150)
VLDLC SERPL CALC-MCNC: 21 MG/DL

## 2019-08-20 ENCOUNTER — TELEPHONE (OUTPATIENT)
Dept: FAMILY MEDICINE CLINIC | Age: 69
End: 2019-08-20

## 2019-08-20 DIAGNOSIS — E78.5 HYPERLIPIDEMIA, UNSPECIFIED HYPERLIPIDEMIA TYPE: Primary | ICD-10-CM

## 2019-08-20 RX ORDER — PRAVASTATIN SODIUM 20 MG
TABLET ORAL
Qty: 90 TABLET | Refills: 1 | Status: SHIPPED | OUTPATIENT
Start: 2019-08-20 | End: 2020-01-07 | Stop reason: ALTCHOICE

## 2019-11-20 DIAGNOSIS — E78.5 HYPERLIPIDEMIA, UNSPECIFIED HYPERLIPIDEMIA TYPE: ICD-10-CM

## 2019-11-20 LAB
ALBUMIN SERPL-MCNC: 4.3 G/DL (ref 3.4–5)
ALP BLD-CCNC: 71 U/L (ref 40–129)
ALT SERPL-CCNC: 91 U/L (ref 10–40)
AST SERPL-CCNC: 65 U/L (ref 15–37)
BILIRUB SERPL-MCNC: 0.5 MG/DL (ref 0–1)
BILIRUBIN DIRECT: <0.2 MG/DL (ref 0–0.3)
BILIRUBIN, INDIRECT: ABNORMAL MG/DL (ref 0–1)
CHOLESTEROL, TOTAL: 195 MG/DL (ref 0–199)
HDLC SERPL-MCNC: 49 MG/DL (ref 40–60)
LDL CHOLESTEROL CALCULATED: 122 MG/DL
TOTAL PROTEIN: 6.8 G/DL (ref 6.4–8.2)
TRIGL SERPL-MCNC: 121 MG/DL (ref 0–150)
VLDLC SERPL CALC-MCNC: 24 MG/DL

## 2019-12-20 ENCOUNTER — OFFICE VISIT (OUTPATIENT)
Dept: FAMILY MEDICINE CLINIC | Age: 69
End: 2019-12-20
Payer: MEDICARE

## 2019-12-20 VITALS
RESPIRATION RATE: 16 BRPM | TEMPERATURE: 98.2 F | DIASTOLIC BLOOD PRESSURE: 82 MMHG | WEIGHT: 198 LBS | SYSTOLIC BLOOD PRESSURE: 137 MMHG | BODY MASS INDEX: 33.99 KG/M2 | HEART RATE: 109 BPM | OXYGEN SATURATION: 94 %

## 2019-12-20 DIAGNOSIS — J32.9 SINOBRONCHITIS: Primary | ICD-10-CM

## 2019-12-20 DIAGNOSIS — J40 SINOBRONCHITIS: Primary | ICD-10-CM

## 2019-12-20 PROCEDURE — G8427 DOCREV CUR MEDS BY ELIG CLIN: HCPCS | Performed by: NURSE PRACTITIONER

## 2019-12-20 PROCEDURE — 4040F PNEUMOC VAC/ADMIN/RCVD: CPT | Performed by: NURSE PRACTITIONER

## 2019-12-20 PROCEDURE — 1036F TOBACCO NON-USER: CPT | Performed by: NURSE PRACTITIONER

## 2019-12-20 PROCEDURE — 99213 OFFICE O/P EST LOW 20 MIN: CPT | Performed by: NURSE PRACTITIONER

## 2019-12-20 PROCEDURE — G8417 CALC BMI ABV UP PARAM F/U: HCPCS | Performed by: NURSE PRACTITIONER

## 2019-12-20 PROCEDURE — 3017F COLORECTAL CA SCREEN DOC REV: CPT | Performed by: NURSE PRACTITIONER

## 2019-12-20 PROCEDURE — 1090F PRES/ABSN URINE INCON ASSESS: CPT | Performed by: NURSE PRACTITIONER

## 2019-12-20 PROCEDURE — G8484 FLU IMMUNIZE NO ADMIN: HCPCS | Performed by: NURSE PRACTITIONER

## 2019-12-20 PROCEDURE — G8399 PT W/DXA RESULTS DOCUMENT: HCPCS | Performed by: NURSE PRACTITIONER

## 2019-12-20 PROCEDURE — 1123F ACP DISCUSS/DSCN MKR DOCD: CPT | Performed by: NURSE PRACTITIONER

## 2019-12-20 RX ORDER — AMOXICILLIN AND CLAVULANATE POTASSIUM 875; 125 MG/1; MG/1
1 TABLET, FILM COATED ORAL 2 TIMES DAILY
Qty: 20 TABLET | Refills: 0 | Status: SHIPPED | OUTPATIENT
Start: 2019-12-20 | End: 2019-12-30

## 2019-12-20 ASSESSMENT — PATIENT HEALTH QUESTIONNAIRE - PHQ9
2. FEELING DOWN, DEPRESSED OR HOPELESS: 0
SUM OF ALL RESPONSES TO PHQ9 QUESTIONS 1 & 2: 0
SUM OF ALL RESPONSES TO PHQ QUESTIONS 1-9: 0
1. LITTLE INTEREST OR PLEASURE IN DOING THINGS: 0
SUM OF ALL RESPONSES TO PHQ QUESTIONS 1-9: 0

## 2019-12-20 ASSESSMENT — ENCOUNTER SYMPTOMS
DIARRHEA: 0
TROUBLE SWALLOWING: 0
NAUSEA: 0
SHORTNESS OF BREATH: 0
SINUS PRESSURE: 1
EYE REDNESS: 0
WHEEZING: 0
COUGH: 1
SINUS PAIN: 1
CHEST TIGHTNESS: 0
RHINORRHEA: 1
SORE THROAT: 1
VOMITING: 0
EYE ITCHING: 0
ABDOMINAL PAIN: 0

## 2020-01-07 ENCOUNTER — NURSE TRIAGE (OUTPATIENT)
Dept: OTHER | Facility: CLINIC | Age: 70
End: 2020-01-07

## 2020-01-07 ENCOUNTER — OFFICE VISIT (OUTPATIENT)
Dept: FAMILY MEDICINE CLINIC | Age: 70
End: 2020-01-07
Payer: MEDICARE

## 2020-01-07 VITALS
HEIGHT: 64 IN | HEART RATE: 83 BPM | OXYGEN SATURATION: 97 % | SYSTOLIC BLOOD PRESSURE: 130 MMHG | BODY MASS INDEX: 34.59 KG/M2 | WEIGHT: 202.6 LBS | DIASTOLIC BLOOD PRESSURE: 86 MMHG

## 2020-01-07 PROBLEM — G45.9 TIA (TRANSIENT ISCHEMIC ATTACK): Status: ACTIVE | Noted: 2020-01-07

## 2020-01-07 PROBLEM — R20.0 RIGHT ARM NUMBNESS: Status: ACTIVE | Noted: 2020-01-07

## 2020-01-07 PROCEDURE — G8427 DOCREV CUR MEDS BY ELIG CLIN: HCPCS | Performed by: FAMILY MEDICINE

## 2020-01-07 PROCEDURE — 99214 OFFICE O/P EST MOD 30 MIN: CPT | Performed by: FAMILY MEDICINE

## 2020-01-07 PROCEDURE — 1123F ACP DISCUSS/DSCN MKR DOCD: CPT | Performed by: FAMILY MEDICINE

## 2020-01-07 PROCEDURE — 3017F COLORECTAL CA SCREEN DOC REV: CPT | Performed by: FAMILY MEDICINE

## 2020-01-07 PROCEDURE — G8399 PT W/DXA RESULTS DOCUMENT: HCPCS | Performed by: FAMILY MEDICINE

## 2020-01-07 PROCEDURE — 1036F TOBACCO NON-USER: CPT | Performed by: FAMILY MEDICINE

## 2020-01-07 PROCEDURE — 1090F PRES/ABSN URINE INCON ASSESS: CPT | Performed by: FAMILY MEDICINE

## 2020-01-07 PROCEDURE — G8484 FLU IMMUNIZE NO ADMIN: HCPCS | Performed by: FAMILY MEDICINE

## 2020-01-07 PROCEDURE — 4040F PNEUMOC VAC/ADMIN/RCVD: CPT | Performed by: FAMILY MEDICINE

## 2020-01-07 PROCEDURE — G8417 CALC BMI ABV UP PARAM F/U: HCPCS | Performed by: FAMILY MEDICINE

## 2020-01-07 ASSESSMENT — PATIENT HEALTH QUESTIONNAIRE - PHQ9
SUM OF ALL RESPONSES TO PHQ9 QUESTIONS 1 & 2: 0
SUM OF ALL RESPONSES TO PHQ QUESTIONS 1-9: 0
2. FEELING DOWN, DEPRESSED OR HOPELESS: 0
SUM OF ALL RESPONSES TO PHQ QUESTIONS 1-9: 0
1. LITTLE INTEREST OR PLEASURE IN DOING THINGS: 0

## 2020-01-07 ASSESSMENT — ENCOUNTER SYMPTOMS
STRIDOR: 0
BACK PAIN: 0
COUGH: 0
SHORTNESS OF BREATH: 0
WHEEZING: 0
CHOKING: 0
ABDOMINAL PAIN: 0
CHEST TIGHTNESS: 0

## 2020-01-07 NOTE — PROGRESS NOTES
Subjective:      Patient ID: Armando Zhou is a 81 y.o. female. HPI Izabela Ulloa is here with a complaint of 2 episodes of right arm numbness and weakness have occurred over the last 2 weeks. The numbness and weakness lasts about 10 minutes. She has had no associated symptoms. She does not have a family history of heart attack or stroke or premature atherosclerosis. Review of Systems   Constitutional: Negative for activity change, appetite change, chills, diaphoresis, fatigue, fever and unexpected weight change. Respiratory: Negative for cough, choking, chest tightness, shortness of breath, wheezing and stridor. Cardiovascular: Negative for chest pain, palpitations and leg swelling. Gastrointestinal: Negative for abdominal pain. Genitourinary: Negative for difficulty urinating. Musculoskeletal: Negative for arthralgias and back pain. Skin: Negative for rash. Neurological: Positive for numbness (intermittent in R arm). Negative for dizziness. Objective:   Physical Exam  Vitals signs and nursing note reviewed. Constitutional:       Appearance: She is well-developed. HENT:      Head: Normocephalic and atraumatic. Right Ear: External ear normal.      Left Ear: External ear normal.      Nose: Nose normal.   Eyes:      Conjunctiva/sclera: Conjunctivae normal.      Pupils: Pupils are equal, round, and reactive to light. Neck:      Musculoskeletal: Normal range of motion and neck supple. Thyroid: No thyromegaly. Vascular: No JVD. Trachea: No tracheal deviation. Cardiovascular:      Rate and Rhythm: Normal rate and regular rhythm. Heart sounds: Normal heart sounds. No murmur. No friction rub. No gallop. Pulmonary:      Effort: Pulmonary effort is normal. No respiratory distress. Breath sounds: Normal breath sounds. No stridor. No wheezing or rales. Chest:      Chest wall: No tenderness.    Abdominal:      General: Bowel sounds are normal. There is no distension. Palpations: Abdomen is soft. There is no mass. Tenderness: There is no tenderness. There is no guarding or rebound. Musculoskeletal: Normal range of motion. General: No tenderness. Lymphadenopathy:      Cervical: No cervical adenopathy. Skin:     General: Skin is warm and dry. Coloration: Skin is not pale. Findings: No erythema or rash. Neurological:      Mental Status: She is alert and oriented to person, place, and time. Cranial Nerves: No cranial nerve deficit. Motor: No abnormal muscle tone. Coordination: Coordination normal.      Deep Tendon Reflexes: Reflexes are normal and symmetric. Reflexes normal.         Assessment and plan     1. TIA (transient ischemic attack)-recommend neurology consult with Dr. Nessa Rangel, start low-dose adult aspirin 1 daily    - MRI BRAIN WO CONTRAST; Future    2. Right arm numbness    - Ultrasound carotid doppler; Future  3. Hyperlipidemia-ordered lipid panel in November 2019. I will remind Ceferino Olson to get that done. I was hoping to be able to order a MRA of the head and neck. This could not be ordered because she is allergic to IVP dye. I advised Ceferino Olson that if she has any sign of recurrence of the symptoms she should immediately dial 911 and go to the emergency room.       Isidro Colby, DO

## 2020-01-08 ENCOUNTER — TELEPHONE (OUTPATIENT)
Dept: FAMILY MEDICINE CLINIC | Age: 70
End: 2020-01-08

## 2020-01-08 NOTE — TELEPHONE ENCOUNTER
I called Dr. Adorno Moab this morning and ask him to see Dani Moritz today or tomorrow about her apparent TIA. He agreed to see her today or tomorrow. Dani Moritz and left her a message that she should expect a call from Dr. Lorie Flynn office this morning with this afternoon with a time in a place.

## 2020-01-09 ENCOUNTER — OFFICE VISIT (OUTPATIENT)
Dept: NEUROLOGY | Age: 70
End: 2020-01-09
Payer: MEDICARE

## 2020-01-09 VITALS
HEART RATE: 85 BPM | BODY MASS INDEX: 34.49 KG/M2 | DIASTOLIC BLOOD PRESSURE: 92 MMHG | WEIGHT: 202 LBS | SYSTOLIC BLOOD PRESSURE: 138 MMHG | HEIGHT: 64 IN | OXYGEN SATURATION: 97 %

## 2020-01-09 PROBLEM — E78.5 DYSLIPIDEMIA: Status: ACTIVE | Noted: 2020-01-09

## 2020-01-09 PROBLEM — G45.1 TIA INVOLVING LEFT INTERNAL CAROTID ARTERY: Status: ACTIVE | Noted: 2020-01-07

## 2020-01-09 PROCEDURE — G8427 DOCREV CUR MEDS BY ELIG CLIN: HCPCS | Performed by: PSYCHIATRY & NEUROLOGY

## 2020-01-09 PROCEDURE — 3017F COLORECTAL CA SCREEN DOC REV: CPT | Performed by: PSYCHIATRY & NEUROLOGY

## 2020-01-09 PROCEDURE — G8484 FLU IMMUNIZE NO ADMIN: HCPCS | Performed by: PSYCHIATRY & NEUROLOGY

## 2020-01-09 PROCEDURE — G8399 PT W/DXA RESULTS DOCUMENT: HCPCS | Performed by: PSYCHIATRY & NEUROLOGY

## 2020-01-09 PROCEDURE — 4040F PNEUMOC VAC/ADMIN/RCVD: CPT | Performed by: PSYCHIATRY & NEUROLOGY

## 2020-01-09 PROCEDURE — G8417 CALC BMI ABV UP PARAM F/U: HCPCS | Performed by: PSYCHIATRY & NEUROLOGY

## 2020-01-09 PROCEDURE — 1090F PRES/ABSN URINE INCON ASSESS: CPT | Performed by: PSYCHIATRY & NEUROLOGY

## 2020-01-09 PROCEDURE — 1123F ACP DISCUSS/DSCN MKR DOCD: CPT | Performed by: PSYCHIATRY & NEUROLOGY

## 2020-01-09 PROCEDURE — 99204 OFFICE O/P NEW MOD 45 MIN: CPT | Performed by: PSYCHIATRY & NEUROLOGY

## 2020-01-09 PROCEDURE — 1036F TOBACCO NON-USER: CPT | Performed by: PSYCHIATRY & NEUROLOGY

## 2020-01-09 NOTE — PROGRESS NOTES
Tylox [Oxycodone-Acetaminophen]     Demerol Hcl [Meperidine]     Dye [Iodides] Hives     IVP before 1990     Social History     Tobacco Use    Smoking status: Never Smoker    Smokeless tobacco: Never Used   Substance Use Topics    Alcohol use: No     Past Surgical History:   Procedure Laterality Date    BREAST SURGERY      breast biopsy    COLONOSCOPY      COLONOSCOPY N/A 1/24/2019    COLON W/ANES. (10:00) performed by Biju Mouse, DO at [address] on L buttocks had to be debrided and sewn shut    DILATION AND CURETTAGE OF UTERUS      ENDOSCOPY, COLON, DIAGNOSTIC      JOINT REPLACEMENT  2008    bilateral knees       ROS : A 10-14 system review of constitutional, cardiovascular, respiratory, GI, eyes, , ENT, musculoskeletal, endocrine, skin, SHEENT, genitourinary, psychiatric and neurologic systems was obtained and updated today and is unremarkable except as mentioned in my HPI        Exam:   Constitutional:   Vitals:    01/09/20 1400   BP: (!) 146/90   Pulse: 85   SpO2: 97%   Weight: 202 lb (91.6 kg)   Height: 5' 4\" (1.626 m)       General appearance:  Normal development and appear in no acute distress. Eye: No icterus. Fundus: No blurring of optic disc. Neck: supple  Cardiovascular: No carotid bruit. No lower leg edema with good pulsation. Mental Status:   Oriented to person, place, problem, and time. Memory: Good immediate recall. Intact remote memory  Normal attention span and concentration. Language: intact naming, repeating and fluency   Good fund of Knowledge. Aware of current events and vocabulary   Cranial Nerves:   II: Visual fields: Full to confrontation and nl VA. Pupils: equal, round, reactive to light  III,IV,VI: Extra Ocular Movements are intact.  No nystagmus  V: Facial sensation is intact to pin prick and light touch  VII: Facial strength and movements: intact and symmetric  VIII: Hearing: Intact to finger rub prevention was discussed with the patient including: Blood pressure monitor and control, lifestyle adjustments and modification, use of AP therapy, Statin and possible side effect, dietary restriction, risk of recurrence and the importance of frequent walking and exercise.

## 2020-01-09 NOTE — LETTER
liver failure       Past Medical History:   Diagnosis Date    Arthritis     Hyperlipemia 5/7/2013     Prior to Visit Medications    Medication Sig Taking? Authorizing Provider   aspirin 81 MG tablet Take 81 mg by mouth daily Yes Historical Provider, MD   atorvastatin (LIPITOR) 20 MG tablet Take 1 qd Yes [address], DO     Allergies   Allergen Reactions    Tylox [Oxycodone-Acetaminophen]     Demerol Hcl [Meperidine]     Dye [Iodides] Hives     IVP before 1990     Social History     Tobacco Use    Smoking status: Never Smoker    Smokeless tobacco: Never Used   Substance Use Topics    Alcohol use: No     Past Surgical History:   Procedure Laterality Date    BREAST SURGERY      breast biopsy    COLONOSCOPY      COLONOSCOPY N/A 1/24/2019    COLON W/ANES. (10:00) performed by Natasha Barroso DO at [address] on L buttocks had to be debrided and sewn shut    DILATION AND CURETTAGE OF UTERUS      ENDOSCOPY, COLON, DIAGNOSTIC      JOINT REPLACEMENT  2008    bilateral knees       ROS : A 10-14 system review of constitutional, cardiovascular, respiratory, GI, eyes, , ENT, musculoskeletal, endocrine, skin, SHEENT, genitourinary, psychiatric and neurologic systems was obtained and updated today and is unremarkable except as mentioned in my HPI        Exam:   Constitutional:   Vitals:    01/09/20 1400   BP: (!) 146/90   Pulse: 85   SpO2: 97%   Weight: 202 lb (91.6 kg)   Height: 5' 4\" (1.626 m)       General appearance:  Normal development and appear in no acute distress. Eye: No icterus. Fundus: No blurring of optic disc. Neck: supple  Cardiovascular: No carotid bruit. No lower leg edema with good pulsation. Mental Status:   Oriented to person, place, problem, and time. Memory: Good immediate recall. Intact remote memory  Normal attention span and concentration.   Language: intact naming, repeating and fluency I will consider event monitor for 1 month to rule out paroxysmal A. fib depending on the above recommendation  Long discussion with the patient today regarding stroke prevention, risk of strokes after TIA and risk of recurrence. Follow-up with me after the above work-up.  -------------------------------------------------------------------------------      Patient's instructions:  Secondary stroke prevention was discussed with the patient including: Blood pressure monitor and control, lifestyle adjustments and modification, use of AP therapy, Statin and possible side effect, dietary restriction, risk of recurrence and the importance of frequent walking and exercise. Please do not hesitate to contact me, should you have any questions or concerns regarding the care of Tanvi     Sincerely,    Ezra Casey MD    This dictation was generated by voice recognition computer software. Although all attempts are made to edit the dictation for accuracy, there may be errors in the transcription that are not intended.

## 2020-01-10 ENCOUNTER — HOSPITAL ENCOUNTER (OUTPATIENT)
Dept: ULTRASOUND IMAGING | Age: 70
Discharge: HOME | End: 2020-01-10
Payer: MEDICARE

## 2020-01-10 ENCOUNTER — HOSPITAL ENCOUNTER (OUTPATIENT)
Dept: MRI IMAGING | Age: 70
Discharge: HOME | End: 2020-01-10
Payer: MEDICARE

## 2020-01-10 ENCOUNTER — APPOINTMENT (OUTPATIENT)
Dept: ULTRASOUND IMAGING | Age: 70
End: 2020-01-10
Payer: MEDICARE

## 2020-01-10 PROCEDURE — 93880 EXTRACRANIAL BILAT STUDY: CPT

## 2020-01-10 PROCEDURE — 70551 MRI BRAIN STEM W/O DYE: CPT

## 2020-01-14 ENCOUNTER — HOSPITAL ENCOUNTER (OUTPATIENT)
Dept: CARDIOLOGY | Age: 70
Discharge: HOME | End: 2020-01-14
Payer: MEDICARE

## 2020-01-14 LAB
LV EF: 55 %
LVEF MODALITY: NORMAL

## 2020-01-14 PROCEDURE — 93306 TTE W/DOPPLER COMPLETE: CPT

## 2020-02-03 ENCOUNTER — OFFICE VISIT (OUTPATIENT)
Dept: NEUROLOGY | Age: 70
End: 2020-02-03
Payer: MEDICARE

## 2020-02-03 VITALS
OXYGEN SATURATION: 98 % | BODY MASS INDEX: 34.49 KG/M2 | WEIGHT: 202 LBS | DIASTOLIC BLOOD PRESSURE: 81 MMHG | HEART RATE: 81 BPM | HEIGHT: 64 IN | SYSTOLIC BLOOD PRESSURE: 120 MMHG

## 2020-02-03 PROCEDURE — 4040F PNEUMOC VAC/ADMIN/RCVD: CPT | Performed by: PSYCHIATRY & NEUROLOGY

## 2020-02-03 PROCEDURE — G8417 CALC BMI ABV UP PARAM F/U: HCPCS | Performed by: PSYCHIATRY & NEUROLOGY

## 2020-02-03 PROCEDURE — 1036F TOBACCO NON-USER: CPT | Performed by: PSYCHIATRY & NEUROLOGY

## 2020-02-03 PROCEDURE — 3017F COLORECTAL CA SCREEN DOC REV: CPT | Performed by: PSYCHIATRY & NEUROLOGY

## 2020-02-03 PROCEDURE — G8427 DOCREV CUR MEDS BY ELIG CLIN: HCPCS | Performed by: PSYCHIATRY & NEUROLOGY

## 2020-02-03 PROCEDURE — G8484 FLU IMMUNIZE NO ADMIN: HCPCS | Performed by: PSYCHIATRY & NEUROLOGY

## 2020-02-03 PROCEDURE — 1123F ACP DISCUSS/DSCN MKR DOCD: CPT | Performed by: PSYCHIATRY & NEUROLOGY

## 2020-02-03 PROCEDURE — 99213 OFFICE O/P EST LOW 20 MIN: CPT | Performed by: PSYCHIATRY & NEUROLOGY

## 2020-02-03 PROCEDURE — 1090F PRES/ABSN URINE INCON ASSESS: CPT | Performed by: PSYCHIATRY & NEUROLOGY

## 2020-02-03 PROCEDURE — G8399 PT W/DXA RESULTS DOCUMENT: HCPCS | Performed by: PSYCHIATRY & NEUROLOGY

## 2020-02-03 NOTE — PROGRESS NOTES
The patient came today for follow up regarding: New onset numbness and possible TIA. Since the patient's last visit, she had an MRI of the brain which showed chronic small vessel disease. Echo showed normal EF without PFO. Her Doppler showed no severe ICA stenosis. She came today for follow-up. She feels back to her baseline. No residual deficit. No weakness or numbness or tingling. No headache, speech or swallowing issues. She is taking aspirin and statin. She is scheduled for liver biopsy in the next few days. Blood pressure has been at 120-130 over the last 2 weeks. No sleep disturbance, insomnia or parasomnia. No other new symptoms today. Other review of system was unremarkable. Family History   Problem Relation Age of Onset    Cancer Mother         lung    Heart Attack Father     Cancer Sister         lung    Stroke Sister     Other Brother         liver failure       Past Medical History:   Diagnosis Date    Arthritis     Hyperlipemia 5/7/2013     Prior to Visit Medications    Medication Sig Taking? Authorizing Provider   atorvastatin (LIPITOR) 20 MG tablet Take 1 qd Yes Nav Rausch DO   aspirin 81 MG tablet Take 81 mg by mouth daily  Historical Provider, MD     Allergies   Allergen Reactions    Tylox [Oxycodone-Acetaminophen]     Demerol Hcl [Meperidine]     Dye [Iodides] Hives     IVP before 1990     Social History     Tobacco Use    Smoking status: Never Smoker    Smokeless tobacco: Never Used   Substance Use Topics    Alcohol use: No     Past Surgical History:   Procedure Laterality Date    BREAST SURGERY      breast biopsy    COLONOSCOPY      COLONOSCOPY N/A 1/24/2019    COLON W/ANES.  (10:00) performed by Neil Watson DO at [address] on L buttocks had to be debrided and sewn shut   [facility]      ENDOSCOPY, COLON, DIAGNOSTIC      JOINT REPLACEMENT  2008    bilateral knees           Exam: Constitutional:   Vitals:    02/03/20 0947   BP: 120/81   Pulse: 81   SpO2: 98%   Weight: 202 lb (91.6 kg)   Height: 5' 4\" (1.626 m)       General appearance:  Normal development and appear in no acute distress. Eye: No icterus. Neck: supple  Cardiovascular:   No lower leg edema with good pulsation. Mental Status:   Oriented to person, place, problem, and time. Memory: Aware of recent and remote event. Good immediate recall. Intact remote memory  Normal attention span and concentration. Language: intact naming, repeating and fluency   Good fund of Knowledge. Aware of current events and vocabulary   Cranial Nerves:   II: Visual fields: Full to confrontation and nl [facility]. Pupils: equal, round, reactive to light  III,IV,VI: Extra Ocular Movements are intact. No nystagmus  V: Facial sensation is intact   VII: Facial strength and movements: intact and symmetric  VIII: Hearing: Intact to finger rub bilaterally  IX: Palate elevation is symmetric  XI: Shoulder shrug is intact  XII: Tongue movements are normal  Musculoskeletal: 5/5 in all 4 extremities. Tone: Normal tone. Reflexes: Bilateral biceps 2/4, triceps 2/4, brachial radialis 2/4, knee 2/4 and ankle 2/4. Planters: flexor bilaterally. Coordination: no pronator drift, no dysmetria with FNF. Normal REM. Sensation: normal to all modalities in both arms and legs. Gait/Posture: steady gait     ROS : A 10-14 system review of constitutional, cardiovascular, respiratory, GI, eyes, , ENT, musculoskeletal, endocrine, hematological, skin, SHEENT, genitourinary, psychiatric and neurologic systems was obtained and updated today which is unremarkable except as mentioned in my HPI      Medical decision making:  I personally reviewed and updated social history, past medical history, medications, allergy, surgical history, and family history as documented in the patient's electronic health records.  Labs and/or neuroimaging and other test results reviewed and discussed with the patient. Reviewed notes from other physicians. Provided patient education regarding risk, benefits and treatment options as well as adherence to medication regimen and side effect from these medications. Assessment:   Diagnosis Orders   1. TIA involving left internal carotid artery     2. Dyslipidemia     3. Elevated liver enzymes             Plan:  Restart aspirin after liver biopsy if no contraindication medically  Statin and follow LFT  Stroke prevention was discussed with the patient today  Continue keeping track of her blood pressure  Follow-up with me if symptoms recur. At that time, I may consider event monitor.

## 2020-02-04 ENCOUNTER — HOSPITAL ENCOUNTER (OUTPATIENT)
Dept: CT IMAGING | Age: 70
Discharge: HOME | End: 2020-02-04
Payer: MEDICARE

## 2020-02-04 ENCOUNTER — HOSPITAL ENCOUNTER (OUTPATIENT)
Dept: ULTRASOUND IMAGING | Age: 70
Discharge: HOME | End: 2020-02-04
Payer: MEDICARE

## 2020-02-04 VITALS
OXYGEN SATURATION: 96 % | RESPIRATION RATE: 13 BRPM | HEART RATE: 73 BPM | HEIGHT: 64 IN | SYSTOLIC BLOOD PRESSURE: 111 MMHG | TEMPERATURE: 96.9 F | WEIGHT: 195 LBS | DIASTOLIC BLOOD PRESSURE: 71 MMHG | BODY MASS INDEX: 33.29 KG/M2

## 2020-02-04 VITALS
TEMPERATURE: 96.9 F | SYSTOLIC BLOOD PRESSURE: 139 MMHG | OXYGEN SATURATION: 95 % | RESPIRATION RATE: 18 BRPM | HEART RATE: 93 BPM | DIASTOLIC BLOOD PRESSURE: 78 MMHG

## 2020-02-04 LAB
INR BLD: 0.99 (ref 0.86–1.14)
PLATELET # BLD: 215 K/UL (ref 135–450)
PROTHROMBIN TIME: 11.5 SEC (ref 10–13.2)

## 2020-02-04 PROCEDURE — 76705 ECHO EXAM OF ABDOMEN: CPT

## 2020-02-04 PROCEDURE — 77012 CT SCAN FOR NEEDLE BIOPSY: CPT

## 2020-02-04 PROCEDURE — 7100000010 HC PHASE II RECOVERY - FIRST 15 MIN

## 2020-02-04 PROCEDURE — 85610 PROTHROMBIN TIME: CPT

## 2020-02-04 PROCEDURE — 88307 TISSUE EXAM BY PATHOLOGIST: CPT

## 2020-02-04 PROCEDURE — 2709999900 CT NEEDLE BIOPSY LIVER PERCUTANEOUS

## 2020-02-04 PROCEDURE — 88313 SPECIAL STAINS GROUP 2: CPT

## 2020-02-04 PROCEDURE — 6360000002 HC RX W HCPCS: Performed by: RADIOLOGY

## 2020-02-04 PROCEDURE — 7100000011 HC PHASE II RECOVERY - ADDTL 15 MIN

## 2020-02-04 PROCEDURE — 85049 AUTOMATED PLATELET COUNT: CPT

## 2020-02-04 RX ORDER — FENTANYL CITRATE 50 UG/ML
INJECTION, SOLUTION INTRAMUSCULAR; INTRAVENOUS
Status: COMPLETED | OUTPATIENT
Start: 2020-02-04 | End: 2020-02-04

## 2020-02-04 RX ORDER — MIDAZOLAM HYDROCHLORIDE 5 MG/ML
INJECTION INTRAMUSCULAR; INTRAVENOUS
Status: COMPLETED | OUTPATIENT
Start: 2020-02-04 | End: 2020-02-04

## 2020-02-04 RX ORDER — ONDANSETRON 2 MG/ML
4 INJECTION INTRAMUSCULAR; INTRAVENOUS EVERY 8 HOURS PRN
Status: DISCONTINUED | OUTPATIENT
Start: 2020-02-04 | End: 2020-02-05 | Stop reason: HOSPADM

## 2020-02-04 RX ADMIN — MIDAZOLAM HYDROCHLORIDE 1 MG: 5 INJECTION, SOLUTION INTRAMUSCULAR; INTRAVENOUS at 11:05

## 2020-02-04 RX ADMIN — MIDAZOLAM HYDROCHLORIDE 1 MG: 5 INJECTION, SOLUTION INTRAMUSCULAR; INTRAVENOUS at 11:10

## 2020-02-04 RX ADMIN — Medication 50 MCG: at 11:05

## 2020-02-04 RX ADMIN — Medication 50 MCG: at 11:10

## 2020-02-04 ASSESSMENT — PAIN SCALES - GENERAL
PAINLEVEL_OUTOF10: 0

## 2020-02-04 ASSESSMENT — PAIN - FUNCTIONAL ASSESSMENT
PAIN_FUNCTIONAL_ASSESSMENT: 0-10
PAIN_FUNCTIONAL_ASSESSMENT: 0-10

## 2020-02-04 NOTE — PRE SEDATION
Sedation Pre-Procedure Note    Patient Name: Pema Wilcox   YOB: 1950  Room/Bed: Room/bed info not found  Medical Record Number: [id number]  Date: 2/4/2020   Time: 10:59 AM       Indication:  Abnormal liver enzymes    Consent: I have discussed with the patient and/or the patient representative the indication, alternatives, and the possible risks and/or complications of the planned procedure and the anesthesia methods. The patient and/or patient representative appear to understand and agree to proceed. Vital Signs:   Vitals:    02/04/20 1007   BP: 135/86   Pulse: 79   Resp: 16   Temp: 96.9 °F (36.1 °C)   SpO2: 97%       Past Medical History:   has a past medical history of Arthritis and Hyperlipemia. Past Surgical History:   has a past surgical history that includes Colonoscopy; Dilation and curettage of uterus; joint replacement (2008); Endoscopy, colon, diagnostic; Breast surgery; debridement; and Colonoscopy (N/A, 1/24/2019). Medications:   Scheduled Meds:   Continuous Infusions:   PRN Meds:   Home Meds:   Prior to Admission medications    Medication Sig Start Date End Date Taking? Authorizing Provider   atorvastatin (LIPITOR) 20 MG tablet Take 1 qd 4/4/19  Yes Nav Rausch, DO   aspirin 81 MG tablet Take 81 mg by mouth daily    Historical Provider, MD     Coumadin Use Last 7 Days:  no  Antiplatelet drug therapy use last 7 days: no  Other anticoagulant use last 7 days: no  Additional Medication Information:        Pre-Sedation Documentation and Exam:   I have reviewed the patient's history and review of systems.     Mallampati Airway Assessment:  Mallampati Class II - (soft palate, fauces & uvula are visible)    Prior History of Anesthesia Complications:   none    ASA Classification:  Class 2 - A normal healthy patient with mild systemic disease    Sedation/ Anesthesia Plan:   intravenous sedation    Medications Planned:   midazolam (Versed) intravenously and fentanyl

## 2020-02-17 RX ORDER — PRAVASTATIN SODIUM 20 MG
TABLET ORAL
Qty: 90 TABLET | Refills: 0 | Status: SHIPPED | OUTPATIENT
Start: 2020-02-17 | End: 2020-05-11

## 2020-02-17 NOTE — TELEPHONE ENCOUNTER
Last appt - 1/7/2020    Future Appointments   Date Time Provider Kwasi Byrne   4/9/2020  9:00 AM DO TAMERA Condon

## 2020-07-07 ENCOUNTER — TELEPHONE (OUTPATIENT)
Dept: FAMILY MEDICINE CLINIC | Age: 70
End: 2020-07-07

## 2020-07-07 ENCOUNTER — OFFICE VISIT (OUTPATIENT)
Dept: FAMILY MEDICINE CLINIC | Age: 70
End: 2020-07-07
Payer: MEDICARE

## 2020-07-07 VITALS
BODY MASS INDEX: 33.12 KG/M2 | OXYGEN SATURATION: 97 % | HEART RATE: 87 BPM | HEIGHT: 64 IN | WEIGHT: 194 LBS | SYSTOLIC BLOOD PRESSURE: 126 MMHG | DIASTOLIC BLOOD PRESSURE: 102 MMHG | TEMPERATURE: 98 F

## 2020-07-07 PROBLEM — Z00.00 MEDICARE ANNUAL WELLNESS VISIT, SUBSEQUENT: Status: ACTIVE | Noted: 2020-07-07

## 2020-07-07 PROCEDURE — 1123F ACP DISCUSS/DSCN MKR DOCD: CPT | Performed by: FAMILY MEDICINE

## 2020-07-07 PROCEDURE — G0439 PPPS, SUBSEQ VISIT: HCPCS | Performed by: FAMILY MEDICINE

## 2020-07-07 PROCEDURE — 3017F COLORECTAL CA SCREEN DOC REV: CPT | Performed by: FAMILY MEDICINE

## 2020-07-07 PROCEDURE — 4040F PNEUMOC VAC/ADMIN/RCVD: CPT | Performed by: FAMILY MEDICINE

## 2020-07-07 ASSESSMENT — PATIENT HEALTH QUESTIONNAIRE - PHQ9
SUM OF ALL RESPONSES TO PHQ QUESTIONS 1-9: 0
SUM OF ALL RESPONSES TO PHQ QUESTIONS 1-9: 0

## 2020-07-07 ASSESSMENT — ENCOUNTER SYMPTOMS
STRIDOR: 0
CHEST TIGHTNESS: 0
ABDOMINAL PAIN: 0
CHOKING: 0
BACK PAIN: 0
WHEEZING: 0
COUGH: 0
SHORTNESS OF BREATH: 0

## 2020-07-07 ASSESSMENT — LIFESTYLE VARIABLES: HOW OFTEN DO YOU HAVE A DRINK CONTAINING ALCOHOL: 0

## 2020-07-07 NOTE — TELEPHONE ENCOUNTER
Please tell Lisa Mail that her blood pressure was elevated at today's visit. Ask her to have her  take her blood pressure once a day for a week and then call us with the numbers.

## 2020-07-07 NOTE — PROGRESS NOTES
Subjective:      Patient ID: Jf Marquez is a 78 y.o. female. HPI Garold Spurling is here for her annual Medicare wellness visit. Her only complaint is that of a recurrence of insomnia that is been treated successfully with Ambien in the past that for her she is having some pain in her knees where she has had bilateral knee replacements. I recommended she try some lidocaine patches. Review of Systems   Constitutional: Negative for activity change, appetite change, chills, diaphoresis, fatigue, fever and unexpected weight change. Respiratory: Negative for cough, choking, chest tightness, shortness of breath, wheezing and stridor. Cardiovascular: Negative for chest pain, palpitations and leg swelling. Gastrointestinal: Negative for abdominal pain. Genitourinary: Negative for difficulty urinating. Musculoskeletal: Positive for arthralgias and gait problem. Negative for back pain. Skin: Negative for rash. Neurological: Negative for dizziness. Psychiatric/Behavioral: Positive for sleep disturbance. Objective:   Physical Exam  Vitals signs and nursing note reviewed. Constitutional:       Appearance: She is well-developed. HENT:      Head: Normocephalic and atraumatic. Right Ear: External ear normal.      Left Ear: External ear normal.      Nose: Nose normal.   Eyes:      Conjunctiva/sclera: Conjunctivae normal.      Pupils: Pupils are equal, round, and reactive to light. Neck:      Musculoskeletal: Normal range of motion and neck supple. Thyroid: No thyromegaly. Vascular: No JVD. Trachea: No tracheal deviation. Cardiovascular:      Rate and Rhythm: Normal rate and regular rhythm. Heart sounds: Normal heart sounds. No murmur. No friction rub. No gallop. Pulmonary:      Effort: Pulmonary effort is normal. No respiratory distress. Breath sounds: Normal breath sounds. No stridor. No wheezing or rales. Chest:      Chest wall: No tenderness.    Abdominal:

## 2020-07-09 RX ORDER — ZOLPIDEM TARTRATE 5 MG/1
5 TABLET ORAL NIGHTLY PRN
Qty: 30 TABLET | Refills: 0 | Status: CANCELLED | OUTPATIENT
Start: 2020-07-09 | End: 2020-08-08

## 2020-07-09 RX ORDER — ZOLPIDEM TARTRATE 5 MG/1
5 TABLET ORAL NIGHTLY PRN
Qty: 30 TABLET | Refills: 0 | Status: SHIPPED | OUTPATIENT
Start: 2020-07-09 | End: 2020-08-10

## 2020-08-06 PROBLEM — Z00.00 MEDICARE ANNUAL WELLNESS VISIT, SUBSEQUENT: Status: RESOLVED | Noted: 2020-07-07 | Resolved: 2020-08-06

## 2020-08-10 RX ORDER — ZOLPIDEM TARTRATE 5 MG/1
5 TABLET ORAL NIGHTLY PRN
Qty: 30 TABLET | Refills: 0 | Status: SHIPPED | OUTPATIENT
Start: 2020-08-10 | End: 2020-12-21 | Stop reason: SDUPTHER

## 2020-12-21 ENCOUNTER — OFFICE VISIT (OUTPATIENT)
Dept: FAMILY MEDICINE CLINIC | Age: 70
End: 2020-12-21
Payer: MEDICARE

## 2020-12-21 VITALS
OXYGEN SATURATION: 98 % | SYSTOLIC BLOOD PRESSURE: 146 MMHG | HEART RATE: 94 BPM | HEIGHT: 64 IN | TEMPERATURE: 97.7 F | BODY MASS INDEX: 33.3 KG/M2 | DIASTOLIC BLOOD PRESSURE: 87 MMHG

## 2020-12-21 LAB
HCT VFR BLD CALC: 47.2 % (ref 36–48)
HEMOGLOBIN: 15.6 G/DL (ref 12–16)
MCH RBC QN AUTO: 30 PG (ref 26–34)
MCHC RBC AUTO-ENTMCNC: 33.1 G/DL (ref 31–36)
MCV RBC AUTO: 90.8 FL (ref 80–100)
PDW BLD-RTO: 14.1 % (ref 12.4–15.4)
PLATELET # BLD: 205 K/UL (ref 135–450)
PMV BLD AUTO: 11.1 FL (ref 5–10.5)
RBC # BLD: 5.2 M/UL (ref 4–5.2)
WBC # BLD: 7.8 K/UL (ref 4–11)

## 2020-12-21 PROCEDURE — G8427 DOCREV CUR MEDS BY ELIG CLIN: HCPCS | Performed by: FAMILY MEDICINE

## 2020-12-21 PROCEDURE — 1036F TOBACCO NON-USER: CPT | Performed by: FAMILY MEDICINE

## 2020-12-21 PROCEDURE — 99214 OFFICE O/P EST MOD 30 MIN: CPT | Performed by: FAMILY MEDICINE

## 2020-12-21 PROCEDURE — 4040F PNEUMOC VAC/ADMIN/RCVD: CPT | Performed by: FAMILY MEDICINE

## 2020-12-21 PROCEDURE — 3017F COLORECTAL CA SCREEN DOC REV: CPT | Performed by: FAMILY MEDICINE

## 2020-12-21 PROCEDURE — G8417 CALC BMI ABV UP PARAM F/U: HCPCS | Performed by: FAMILY MEDICINE

## 2020-12-21 PROCEDURE — G8484 FLU IMMUNIZE NO ADMIN: HCPCS | Performed by: FAMILY MEDICINE

## 2020-12-21 PROCEDURE — 1123F ACP DISCUSS/DSCN MKR DOCD: CPT | Performed by: FAMILY MEDICINE

## 2020-12-21 PROCEDURE — 1090F PRES/ABSN URINE INCON ASSESS: CPT | Performed by: FAMILY MEDICINE

## 2020-12-21 PROCEDURE — 36415 COLL VENOUS BLD VENIPUNCTURE: CPT | Performed by: FAMILY MEDICINE

## 2020-12-21 PROCEDURE — G8399 PT W/DXA RESULTS DOCUMENT: HCPCS | Performed by: FAMILY MEDICINE

## 2020-12-21 RX ORDER — ZOLPIDEM TARTRATE 5 MG/1
5 TABLET ORAL NIGHTLY PRN
Qty: 30 TABLET | Refills: 2 | Status: SHIPPED | OUTPATIENT
Start: 2020-12-21 | End: 2021-01-20

## 2020-12-21 ASSESSMENT — ENCOUNTER SYMPTOMS
ABDOMINAL PAIN: 0
CHEST TIGHTNESS: 0
COUGH: 0
STRIDOR: 0
WHEEZING: 0
CHOKING: 0
SHORTNESS OF BREATH: 0
BACK PAIN: 0

## 2020-12-21 NOTE — PROGRESS NOTES
Subjective:      Patient ID: Aide Leiva is a 78 y.o. female. HPI [address] is here for follow-up on insomnia which responds well to Ambien which will be continued. We discussed hyperlipidemia. She remains overweight. Review of Systems   Constitutional: Negative for activity change, appetite change, chills, diaphoresis, fatigue, fever and unexpected weight change. Respiratory: Negative for cough, choking, chest tightness, shortness of breath, wheezing and stridor. Cardiovascular: Negative for chest pain, palpitations and leg swelling. Gastrointestinal: Negative for abdominal pain. Genitourinary: Negative for difficulty urinating. Musculoskeletal: Negative for arthralgias and back pain. Skin: Negative for rash. Neurological: Negative for dizziness. Psychiatric/Behavioral: Positive for sleep disturbance. Objective:   Physical Exam  Vitals signs and nursing note reviewed. Constitutional:       Appearance: She is well-developed. HENT:      Head: Normocephalic and atraumatic. Right Ear: External ear normal.      Left Ear: External ear normal.      Nose: Nose normal.   Eyes:      Conjunctiva/sclera: Conjunctivae normal.      Pupils: Pupils are equal, round, and reactive to light. Neck:      Musculoskeletal: Normal range of motion and neck supple. Thyroid: No thyromegaly. Vascular: No JVD. Trachea: No tracheal deviation. Cardiovascular:      Rate and Rhythm: Normal rate and regular rhythm. Heart sounds: Normal heart sounds. No murmur. No friction rub. No gallop. Pulmonary:      Effort: Pulmonary effort is normal. No respiratory distress. Breath sounds: Normal breath sounds. No stridor. No wheezing or rales. Chest:      Chest wall: No tenderness. Abdominal:      General: Bowel sounds are normal. There is no distension. Palpations: Abdomen is soft. There is no mass. Tenderness: There is no abdominal tenderness. There is no guarding or rebound. Musculoskeletal: Normal range of motion. General: No tenderness. Lymphadenopathy:      Cervical: No cervical adenopathy. Skin:     General: Skin is warm and dry. Coloration: Skin is not pale. Findings: No erythema or rash. Neurological:      Mental Status: She is alert and oriented to person, place, and time. Cranial Nerves: No cranial nerve deficit. Motor: No abnormal muscle tone. Coordination: Coordination normal.      Deep Tendon Reflexes: Reflexes are normal and symmetric. Reflexes normal.         Assessment and plan      1. Insomnia, unspecified type-responding to Ambien, continue- zolpidem (AMBIEN) 5 MG tablet; Take 1 tablet by mouth nightly as needed for Sleep for up to 30 days. Dispense: 30 tablet; Refill: 2    2. Hyperlipidemia, unspecified hyperlipidemia type-stable, continue current therapy  - Comprehensive Metabolic Panel  - CBC  - Hepatic Function Panel  - Lipid Panel  - TSH without Reflex    3.  Overweight-decrease calories and increase exercise          Alexandro Rausch, DO

## 2020-12-22 LAB
A/G RATIO: 1.8 (ref 1.1–2.2)
ALBUMIN SERPL-MCNC: 4.5 G/DL (ref 3.4–5)
ALP BLD-CCNC: 83 U/L (ref 40–129)
ALT SERPL-CCNC: 48 U/L (ref 10–40)
ANION GAP SERPL CALCULATED.3IONS-SCNC: 10 MMOL/L (ref 3–16)
AST SERPL-CCNC: 37 U/L (ref 15–37)
BILIRUB SERPL-MCNC: <0.2 MG/DL (ref 0–1)
BILIRUBIN DIRECT: <0.2 MG/DL (ref 0–0.3)
BILIRUBIN, INDIRECT: NORMAL MG/DL (ref 0–1)
BUN BLDV-MCNC: 13 MG/DL (ref 7–20)
CALCIUM SERPL-MCNC: 10.2 MG/DL (ref 8.3–10.6)
CHLORIDE BLD-SCNC: 106 MMOL/L (ref 99–110)
CHOLESTEROL, TOTAL: 212 MG/DL (ref 0–199)
CO2: 25 MMOL/L (ref 21–32)
CREAT SERPL-MCNC: 0.8 MG/DL (ref 0.6–1.2)
GFR AFRICAN AMERICAN: >60
GFR NON-AFRICAN AMERICAN: >60
GLOBULIN: 2.5 G/DL
GLUCOSE BLD-MCNC: 114 MG/DL (ref 70–99)
HDLC SERPL-MCNC: 49 MG/DL (ref 40–60)
LDL CHOLESTEROL CALCULATED: ABNORMAL MG/DL
LDL CHOLESTEROL DIRECT: 132 MG/DL
POTASSIUM SERPL-SCNC: 4.1 MMOL/L (ref 3.5–5.1)
SODIUM BLD-SCNC: 141 MMOL/L (ref 136–145)
TOTAL PROTEIN: 7 G/DL (ref 6.4–8.2)
TRIGL SERPL-MCNC: 303 MG/DL (ref 0–150)
TSH SERPL DL<=0.05 MIU/L-ACNC: 3.85 UIU/ML (ref 0.27–4.2)
VLDLC SERPL CALC-MCNC: ABNORMAL MG/DL

## 2021-01-11 ENCOUNTER — TELEPHONE (OUTPATIENT)
Dept: FAMILY MEDICINE CLINIC | Age: 71
End: 2021-01-11

## 2021-01-11 DIAGNOSIS — E78.5 HYPERLIPIDEMIA, UNSPECIFIED HYPERLIPIDEMIA TYPE: Primary | ICD-10-CM

## 2021-01-11 NOTE — TELEPHONE ENCOUNTER
Patient notified / there are no labs ordered in [facility] for her to get done in June. Should there be?

## 2021-01-11 NOTE — TELEPHONE ENCOUNTER
Please tell Shantel Daley or Gaudencio Pedroza that she does not need any new blood work till June. After she has blood drawn she should come in for an appointment.

## 2021-01-11 NOTE — TELEPHONE ENCOUNTER
Patient's  asked if patient needs to have blood work done for Lipid. Also when should she come back in for appointment.

## 2021-01-25 ENCOUNTER — TELEPHONE (OUTPATIENT)
Dept: FAMILY MEDICINE CLINIC | Age: 71
End: 2021-01-25

## 2021-01-25 RX ORDER — PRAVASTATIN SODIUM 40 MG
TABLET ORAL
Qty: 90 TABLET | Refills: 1 | Status: SHIPPED | OUTPATIENT
Start: 2021-01-25 | End: 2021-07-26

## 2021-01-25 NOTE — TELEPHONE ENCOUNTER
Pt called requesting refill. She also stated that Dr. Duane Muscat had put her on 40 mg and would need a new prescription for this    pravastatin (PRAVACHOL) 20 MG tablet       Pharm[place]    [place] [id number] [facility]       Last appt. 12/21/2020    No future appointments.

## 2021-03-02 ENCOUNTER — IMMUNIZATION (OUTPATIENT)
Dept: PRIMARY CARE CLINIC | Age: 71
End: 2021-03-02
Payer: MEDICARE

## 2021-03-02 PROCEDURE — 0011A PR IMM ADMN SARSCOV2 100 MCG/0.5 ML 1ST DOSE: CPT | Performed by: FAMILY MEDICINE

## 2021-03-02 PROCEDURE — 91301 COVID-19, MODERNA VACCINE 100MCG/0.5ML DOSE: CPT | Performed by: FAMILY MEDICINE

## 2021-03-30 ENCOUNTER — HOSPITAL ENCOUNTER (OUTPATIENT)
Dept: WOMENS IMAGING | Age: 71
Discharge: HOME | End: 2021-03-30
Payer: MEDICARE

## 2021-03-30 ENCOUNTER — IMMUNIZATION (OUTPATIENT)
Dept: PRIMARY CARE CLINIC | Age: 71
End: 2021-03-30
Payer: MEDICARE

## 2021-03-30 DIAGNOSIS — Z12.31 ENCOUNTER FOR SCREENING MAMMOGRAM FOR BREAST CANCER: ICD-10-CM

## 2021-03-30 PROCEDURE — 91301 COVID-19, MODERNA VACCINE 100MCG/0.5ML DOSE: CPT | Performed by: FAMILY MEDICINE

## 2021-03-30 PROCEDURE — 77067 SCR MAMMO BI INCL CAD: CPT

## 2021-03-30 PROCEDURE — 0012A COVID-19, MODERNA VACCINE 100MCG/0.5ML DOSE: CPT | Performed by: FAMILY MEDICINE

## 2021-06-11 DIAGNOSIS — E78.5 HYPERLIPIDEMIA, UNSPECIFIED HYPERLIPIDEMIA TYPE: ICD-10-CM

## 2021-06-11 LAB
ALBUMIN SERPL-MCNC: 4.4 G/DL (ref 3.4–5)
ALP BLD-CCNC: 63 U/L (ref 40–129)
ALT SERPL-CCNC: 27 U/L (ref 10–40)
AST SERPL-CCNC: 28 U/L (ref 15–37)
BILIRUB SERPL-MCNC: 0.5 MG/DL (ref 0–1)
BILIRUBIN DIRECT: <0.2 MG/DL (ref 0–0.3)
BILIRUBIN, INDIRECT: NORMAL MG/DL (ref 0–1)
CHOLESTEROL, TOTAL: 195 MG/DL (ref 0–199)
HDLC SERPL-MCNC: 44 MG/DL (ref 40–60)
LDL CHOLESTEROL CALCULATED: 123 MG/DL
TOTAL PROTEIN: 6.9 G/DL (ref 6.4–8.2)
TRIGL SERPL-MCNC: 138 MG/DL (ref 0–150)
VLDLC SERPL CALC-MCNC: 28 MG/DL

## 2021-07-07 ENCOUNTER — TELEPHONE (OUTPATIENT)
Dept: FAMILY MEDICINE CLINIC | Age: 71
End: 2021-07-07

## 2021-07-07 NOTE — TELEPHONE ENCOUNTER
I talked to Edmar Murdock about her UTI symptoms and sent a prescription for Bactrim DS No. 20 ,1 twice daily to the pharmacy she requested.

## 2021-07-07 NOTE — TELEPHONE ENCOUNTER
Patient is on vacation is [employer] and has UTI they are requesting something be called in ASAP to [facility] [address] , [place] [address] #  [phone] please call patient once called in so they can

## 2022-01-06 ENCOUNTER — TELEPHONE (OUTPATIENT)
Dept: FAMILY MEDICINE CLINIC | Age: 72
End: 2022-01-06

## 2022-01-06 DIAGNOSIS — E78.5 HYPERLIPIDEMIA, UNSPECIFIED HYPERLIPIDEMIA TYPE: Primary | ICD-10-CM

## 2022-01-06 NOTE — TELEPHONE ENCOUNTER
Please tell Paul Gerard that I will place the orders and she can go and have the blood drawn at any [facility] facility without needing any paperwork.

## 2022-01-06 NOTE — TELEPHONE ENCOUNTER
----- Message from Katie Sanz sent at 1/6/2022 12:37 PM EST -----  Subject: Message to Provider    QUESTIONS  Information for Provider? pt would to know if her BW orders can be sent   prior her appt on 1/24?  ---------------------------------------------------------------------------  --------------  CALL BACK INFO  What is the best way for the office to contact you? OK to leave message on   [place], OK to respond with electronic message via [email or web address] portal (only   for patients who have registered [email or web address] account)  Preferred Call Back Phone Number?  [phone]  ---------------------------------------------------------------------------  --------------  SCRIPT ANSWERS  undefined

## 2022-01-17 DIAGNOSIS — E78.5 HYPERLIPIDEMIA, UNSPECIFIED HYPERLIPIDEMIA TYPE: ICD-10-CM

## 2022-01-17 LAB
A/G RATIO: 1.8 (ref 1.1–2.2)
ALBUMIN SERPL-MCNC: 4.6 G/DL (ref 3.4–5)
ALP BLD-CCNC: 70 U/L (ref 40–129)
ALT SERPL-CCNC: 33 U/L (ref 10–40)
ANION GAP SERPL CALCULATED.3IONS-SCNC: 14 MMOL/L (ref 3–16)
AST SERPL-CCNC: 28 U/L (ref 15–37)
BILIRUB SERPL-MCNC: 0.5 MG/DL (ref 0–1)
BILIRUBIN DIRECT: <0.2 MG/DL (ref 0–0.3)
BILIRUBIN, INDIRECT: NORMAL MG/DL (ref 0–1)
BUN BLDV-MCNC: 11 MG/DL (ref 7–20)
CALCIUM SERPL-MCNC: 9.3 MG/DL (ref 8.3–10.6)
CHLORIDE BLD-SCNC: 105 MMOL/L (ref 99–110)
CHOLESTEROL, TOTAL: 221 MG/DL (ref 0–199)
CO2: 24 MMOL/L (ref 21–32)
CREAT SERPL-MCNC: 0.8 MG/DL (ref 0.6–1.2)
GFR AFRICAN AMERICAN: >60
GFR NON-AFRICAN AMERICAN: >60
GLUCOSE BLD-MCNC: 90 MG/DL (ref 70–99)
HCT VFR BLD CALC: 47.6 % (ref 36–48)
HDLC SERPL-MCNC: 52 MG/DL (ref 40–60)
HEMOGLOBIN: 15.9 G/DL (ref 12–16)
LDL CHOLESTEROL CALCULATED: 143 MG/DL
MCH RBC QN AUTO: 29.9 PG (ref 26–34)
MCHC RBC AUTO-ENTMCNC: 33.4 G/DL (ref 31–36)
MCV RBC AUTO: 89.4 FL (ref 80–100)
PDW BLD-RTO: 14.2 % (ref 12.4–15.4)
PLATELET # BLD: 215 K/UL (ref 135–450)
PMV BLD AUTO: 9.9 FL (ref 5–10.5)
POTASSIUM SERPL-SCNC: 4.6 MMOL/L (ref 3.5–5.1)
RBC # BLD: 5.32 M/UL (ref 4–5.2)
SODIUM BLD-SCNC: 143 MMOL/L (ref 136–145)
TOTAL PROTEIN: 7.2 G/DL (ref 6.4–8.2)
TRIGL SERPL-MCNC: 128 MG/DL (ref 0–150)
TSH SERPL DL<=0.05 MIU/L-ACNC: 5.56 UIU/ML (ref 0.27–4.2)
VLDLC SERPL CALC-MCNC: 26 MG/DL
WBC # BLD: 6 K/UL (ref 4–11)

## 2022-01-24 ENCOUNTER — OFFICE VISIT (OUTPATIENT)
Dept: FAMILY MEDICINE CLINIC | Age: 72
End: 2022-01-24
Payer: MEDICARE

## 2022-01-24 VITALS
HEART RATE: 92 BPM | DIASTOLIC BLOOD PRESSURE: 89 MMHG | WEIGHT: 195 LBS | SYSTOLIC BLOOD PRESSURE: 133 MMHG | TEMPERATURE: 96.9 F | HEIGHT: 63 IN | OXYGEN SATURATION: 97 % | BODY MASS INDEX: 34.55 KG/M2

## 2022-01-24 DIAGNOSIS — Z00.00 MEDICARE ANNUAL WELLNESS VISIT, SUBSEQUENT: Primary | ICD-10-CM

## 2022-01-24 DIAGNOSIS — Z00.00 ROUTINE GENERAL MEDICAL EXAMINATION AT A HEALTH CARE FACILITY: ICD-10-CM

## 2022-01-24 DIAGNOSIS — E78.5 HYPERLIPIDEMIA, UNSPECIFIED HYPERLIPIDEMIA TYPE: ICD-10-CM

## 2022-01-24 PROCEDURE — G0439 PPPS, SUBSEQ VISIT: HCPCS | Performed by: FAMILY MEDICINE

## 2022-01-24 PROCEDURE — 1123F ACP DISCUSS/DSCN MKR DOCD: CPT | Performed by: FAMILY MEDICINE

## 2022-01-24 PROCEDURE — 4040F PNEUMOC VAC/ADMIN/RCVD: CPT | Performed by: FAMILY MEDICINE

## 2022-01-24 PROCEDURE — G8484 FLU IMMUNIZE NO ADMIN: HCPCS | Performed by: FAMILY MEDICINE

## 2022-01-24 PROCEDURE — 3017F COLORECTAL CA SCREEN DOC REV: CPT | Performed by: FAMILY MEDICINE

## 2022-01-24 SDOH — ECONOMIC STABILITY: FOOD INSECURITY: WITHIN THE PAST 12 MONTHS, YOU WORRIED THAT YOUR FOOD WOULD RUN OUT BEFORE YOU GOT MONEY TO BUY MORE.: NEVER TRUE

## 2022-01-24 SDOH — ECONOMIC STABILITY: FOOD INSECURITY: WITHIN THE PAST 12 MONTHS, THE FOOD YOU BOUGHT JUST DIDN'T LAST AND YOU DIDN'T HAVE MONEY TO GET MORE.: NEVER TRUE

## 2022-01-24 ASSESSMENT — LIFESTYLE VARIABLES: HOW OFTEN DO YOU HAVE A DRINK CONTAINING ALCOHOL: 0

## 2022-01-24 ASSESSMENT — PATIENT HEALTH QUESTIONNAIRE - PHQ9
SUM OF ALL RESPONSES TO PHQ QUESTIONS 1-9: 0
SUM OF ALL RESPONSES TO PHQ9 QUESTIONS 1 & 2: 0
2. FEELING DOWN, DEPRESSED OR HOPELESS: 0
1. LITTLE INTEREST OR PLEASURE IN DOING THINGS: 0
SUM OF ALL RESPONSES TO PHQ QUESTIONS 1-9: 0

## 2022-01-24 ASSESSMENT — ENCOUNTER SYMPTOMS
STRIDOR: 0
WHEEZING: 0
SHORTNESS OF BREATH: 0
CHEST TIGHTNESS: 0
CHOKING: 0
BACK PAIN: 0
ABDOMINAL PAIN: 0
COUGH: 0

## 2022-01-24 ASSESSMENT — SOCIAL DETERMINANTS OF HEALTH (SDOH): HOW HARD IS IT FOR YOU TO PAY FOR THE VERY BASICS LIKE FOOD, HOUSING, MEDICAL CARE, AND HEATING?: NOT VERY HARD

## 2022-01-24 NOTE — PROGRESS NOTES
Subjective:      Patient ID: Suze Pearl is a 73 y.o. female. MARTIN Urias is here for Medicare annual wellness visit. She has no new complaints or problems and feels well. We discussed the fact that her total cholesterol and LDL have elevated and she tells me this is due to dietary indiscretion. I offered to increase the dose of her Pravachol and she said she would rather follow-up a low LDL diet more carefully and repeat a liver and a lipid panel in 6 months. Review of Systems   Constitutional: Negative for activity change, appetite change, chills, diaphoresis, fatigue, fever and unexpected weight change. Respiratory: Negative for cough, choking, chest tightness, shortness of breath, wheezing and stridor. Cardiovascular: Negative for chest pain, palpitations and leg swelling. Gastrointestinal: Negative for abdominal pain. Genitourinary: Negative for difficulty urinating. Musculoskeletal: Negative for arthralgias and back pain. Skin: Negative for rash. Neurological: Negative for dizziness. Objective:   Physical Exam  Vitals and nursing note reviewed. Constitutional:       Appearance: She is well-developed. HENT:      Head: Normocephalic and atraumatic. Right Ear: External ear normal.      Left Ear: External ear normal.      Nose: Nose normal.   Eyes:      Conjunctiva/sclera: Conjunctivae normal.      Pupils: Pupils are equal, round, and reactive to light. Neck:      Thyroid: No thyromegaly. Vascular: No JVD. Trachea: No tracheal deviation. Cardiovascular:      Rate and Rhythm: Normal rate and regular rhythm. Heart sounds: Normal heart sounds. No murmur heard. No friction rub. No gallop. Pulmonary:      Effort: Pulmonary effort is normal. No respiratory distress. Breath sounds: Normal breath sounds. No stridor. No wheezing or rales. Chest:      Chest wall: No tenderness. Abdominal:      General: Bowel sounds are normal. There is no distension. Palpations: Abdomen is soft. There is no mass. Tenderness: There is no abdominal tenderness. There is no guarding or rebound. Musculoskeletal:         General: No tenderness. Normal range of motion. Cervical back: Normal range of motion and neck supple. Lymphadenopathy:      Cervical: No cervical adenopathy. Skin:     General: Skin is warm and dry. Coloration: Skin is not pale. Findings: No erythema or rash. Neurological:      Mental Status: She is alert and oriented to person, place, and time. Cranial Nerves: No cranial nerve deficit. Motor: No abnormal muscle tone. Coordination: Coordination normal.      Deep Tendon Reflexes: Reflexes are normal and symmetric. Reflexes normal.         Assessment / Plan:       1. Hyperlipidemia, unspecified hyperlipidemia type-follow a low LDL diet more carefully. Repeat liver lipid panel in 6 months. - Lipid Panel; Future  - Hepatic Function Panel; Future    2.  Medicare annual wellness visit, subsequent

## 2022-01-24 NOTE — PATIENT INSTRUCTIONS
Personalized Preventive Plan for Carissa Guest - 1/24/2022  Medicare offers a range of preventive health benefits. Some of the tests and screenings are paid in full while other may be subject to a deductible, co-insurance, and/or copay. Some of these benefits include a comprehensive review of your medical history including lifestyle, illnesses that may run in your family, and various assessments and screenings as appropriate. After reviewing your medical record and screening and assessments performed today your provider may have ordered immunizations, labs, imaging, and/or referrals for you. A list of these orders (if applicable) as well as your Preventive Care list are included within your After Visit Summary for your review. Other Preventive Recommendations:    · A preventive eye exam performed by an eye specialist is recommended every 1-2 years to screen for glaucoma; cataracts, macular degeneration, and other eye disorders. · A preventive dental visit is recommended every 6 months. · Try to get at least 150 minutes of exercise per week or 10,000 steps per day on a pedometer . · Order or download the FREE \"Exercise & Physical Activity: Your Everyday Guide\" from [employer]. Call 7-886.371.3147 or search [employer] online. · You need 0176-0823 mg of calcium and 6576-7347 IU of vitamin D per day. It is possible to meet your calcium requirement with diet alone, but a vitamin D supplement is usually necessary to meet this goal.  · When exposed to the sun, use a sunscreen that protects against both UVA and UVB radiation with an SPF of 30 or greater. Reapply every 2 to 3 hours or after sweating, drying off with a towel, or swimming. · Always wear a seat belt when traveling in a car. Always wear a helmet when riding a bicycle or motorcycle.

## 2022-03-30 ENCOUNTER — HOSPITAL ENCOUNTER (OUTPATIENT)
Dept: WOMENS IMAGING | Age: 72
Discharge: HOME | End: 2022-03-30
Payer: MEDICARE

## 2022-03-30 VITALS — HEIGHT: 63 IN | WEIGHT: 190 LBS | BODY MASS INDEX: 33.66 KG/M2

## 2022-03-30 DIAGNOSIS — Z12.31 ENCOUNTER FOR SCREENING MAMMOGRAM FOR BREAST CANCER: ICD-10-CM

## 2022-03-30 PROCEDURE — 77067 SCR MAMMO BI INCL CAD: CPT

## 2022-04-25 RX ORDER — PRAVASTATIN SODIUM 40 MG
TABLET ORAL
Qty: 90 TABLET | Refills: 1 | Status: SHIPPED | OUTPATIENT
Start: 2022-04-25 | End: 2022-07-28 | Stop reason: SDUPTHER

## 2022-04-27 ENCOUNTER — TELEPHONE (OUTPATIENT)
Dept: ORTHOPEDIC SURGERY | Age: 72
End: 2022-04-27

## 2022-04-27 NOTE — TELEPHONE ENCOUNTER
Appointment Request     Patient requesting earlier appointment: Yes  Appointment offered to patient: NONE  Patient Contact Number: [phone]  PT WANTS TO COME IN WITH HER  ON 05/03, HOWEVER, SHE HAS NOT BEEN SEEN SINCE 2015. PT WANTS TO SEE CDM FOR ALCIDES KNEES, R HIP, L SHOULDER.

## 2022-05-03 ENCOUNTER — OFFICE VISIT (OUTPATIENT)
Dept: ORTHOPEDIC SURGERY | Age: 72
End: 2022-05-03

## 2022-05-03 DIAGNOSIS — M25.551 RIGHT HIP PAIN: ICD-10-CM

## 2022-05-03 DIAGNOSIS — M25.512 LEFT SHOULDER PAIN, UNSPECIFIED CHRONICITY: ICD-10-CM

## 2022-05-03 DIAGNOSIS — M16.11 PRIMARY OSTEOARTHRITIS OF RIGHT HIP: Primary | ICD-10-CM

## 2022-05-03 DIAGNOSIS — M75.42 ROTATOR CUFF IMPINGEMENT SYNDROME OF LEFT SHOULDER: ICD-10-CM

## 2022-05-03 DIAGNOSIS — M25.561 RIGHT KNEE PAIN, UNSPECIFIED CHRONICITY: ICD-10-CM

## 2022-05-03 PROCEDURE — 4040F PNEUMOC VAC/ADMIN/RCVD: CPT | Performed by: ORTHOPAEDIC SURGERY

## 2022-05-03 PROCEDURE — G8417 CALC BMI ABV UP PARAM F/U: HCPCS | Performed by: ORTHOPAEDIC SURGERY

## 2022-05-03 PROCEDURE — 1123F ACP DISCUSS/DSCN MKR DOCD: CPT | Performed by: ORTHOPAEDIC SURGERY

## 2022-05-03 PROCEDURE — 99213 OFFICE O/P EST LOW 20 MIN: CPT | Performed by: ORTHOPAEDIC SURGERY

## 2022-05-03 PROCEDURE — G8399 PT W/DXA RESULTS DOCUMENT: HCPCS | Performed by: ORTHOPAEDIC SURGERY

## 2022-05-03 PROCEDURE — 1036F TOBACCO NON-USER: CPT | Performed by: ORTHOPAEDIC SURGERY

## 2022-05-03 PROCEDURE — 3017F COLORECTAL CA SCREEN DOC REV: CPT | Performed by: ORTHOPAEDIC SURGERY

## 2022-05-03 PROCEDURE — 1090F PRES/ABSN URINE INCON ASSESS: CPT | Performed by: ORTHOPAEDIC SURGERY

## 2022-05-03 PROCEDURE — G8428 CUR MEDS NOT DOCUMENT: HCPCS | Performed by: ORTHOPAEDIC SURGERY

## 2022-05-03 NOTE — PROGRESS NOTES
SHOULDER VISIT      HISTORY OF PRESENT ILLNESS    Tashia Solomon is a 81 y.o. female who presents for evaluation of left shoulder pain she has had for the last 6 months. She has had no history of injury but grades her pain 7/10 sharp with limited range of motion and pain during function. She has had issues like this in the past and is been placed on aggressive physician directed therapy program medication center. Despite doing everything he should been told in the past she persists having pain and increased disability. She also says she has occasional pain in both knees mostly right but has a grinding discomfort which she feels in her knees which is intermittent she grades 8-9 over 10. The right hip is also painful with function such as getting out of cars etc. because of this she is here for evaluation. She did have her left total hip done by Dr. Yohan Patel at [facility] and was happy with his care and the anterior approach that she had. ROS    Well-documented patient history form dated 5/3/2022  All other ROS negative except for above. Past Surgical history    Past Surgical History:   Procedure Laterality Date    BREAST LUMPECTOMY      BREAST SURGERY      breast biopsy    COLONOSCOPY      COLONOSCOPY N/A 1/24/2019    COLON W/ANES.  (10:00) performed by Diego Cunningham DO at [address] on L buttocks had to be debrided and sewn shut    DILATION AND CURETTAGE OF UTERUS      ENDOSCOPY, COLON, DIAGNOSTIC      JOINT REPLACEMENT  2008    bilateral knees       PAST MEDICAL    Past Medical History:   Diagnosis Date    Arthritis     Hyperlipemia 5/7/2013    Primary osteoarthritis of right hip 5/3/2022       Allergies    Allergies   Allergen Reactions    Tylox [Oxycodone-Acetaminophen]     Demerol Hcl [Meperidine]     Dye [Iodides] Hives     IVP before 1990       Meds    Current Outpatient Medications   Medication Sig Dispense Refill    pravastatin (PRAVACHOL) 40 MG tablet TAKE 1 TABLET BY MOUTH IN THE EVENING FOR HIGH CHOLESTEROL 90 tablet 1    aspirin 81 MG tablet Take 81 mg by mouth daily       No current facility-administered medications for this visit. Social    Social History     Socioeconomic History    Marital status:      Spouse name: Not on file    Number of children: Not on file    Years of education: Not on file    Highest education level: Not on file   Occupational History    Not on file   Tobacco Use    Smoking status: Never Smoker    Smokeless tobacco: Never Used   Vaping Use    Vaping Use: Never used   Substance and Sexual Activity    Alcohol use: No    Drug use: No    Sexual activity: Not on file   Other Topics Concern    Not on file   Social History Narrative    Not on file     Social Determinants of Health     Financial Resource Strain: Low Risk     Difficulty of Paying Living Expenses: Not very hard   Food Insecurity: No Food Insecurity    Worried About Running Out of Food in the Last Year: Never true    Brad of Food in the Last Year: Never true   Transportation Needs:     Lack of Transportation (Medical): Not on file    Lack of Transportation (Non-Medical):  Not on file   Physical Activity:     Days of Exercise per Week: Not on file    Minutes of Exercise per Session: Not on file   Stress:     Feeling of Stress : Not on file   Social Connections:     Frequency of Communication with Friends and Family: Not on file    Frequency of Social Gatherings with Friends and Family: Not on file    Attends [employer] Services: Not on file    Active Member of Clubs or Organizations: Not on file    Attends Club or Organization Meetings: Not on file    Marital Status: Not on file   Intimate Partner Violence:     Fear of Current or Ex-Partner: Not on file    Emotionally Abused: Not on file    Physically Abused: Not on file    Sexually Abused: Not on file   Housing Stability:     Unable to Pay for Housing in the Last Year: Not on file    Number of Places Lived in the Last Year: Not on file    Unstable Housing in the Last Year: Not on file       Family HISTORY    Family History   Problem Relation Age of Onset    Cancer Mother         lung    Heart Attack Father     Cancer Sister         lung    Stroke Sister     Other Brother         liver failure       PHYSICAL EXAM    Vital Signs: There were no vitals taken for this visit. General Appearance:  Normal body habitus. Alert and oriented to person, place, and time. Affect:  Normal.   Skin:  Intact. Sensation:  Intact. Strength:  Intact. Reflexes:  Intact. Pulses:  Intact. Shoulder Exam  She has a full range of motion of the neck. Examination of the shoulder reveals: Nearly full passive and active range of motion painful arc positive impingement sign weakness with rotator cuff function and crossed arm adduction. Her neurocirculatory lymphatic exam otherwise is normal symmetric both upper and lower extremities. In her right hip she has painful range of motion consistent with arthritis which is limited and does refer pain down to her knees. On exam to both knees today are unremarkable having no effusion good range of motion and no gross instability warmth erythema or other evidence of anything that I would consider to be failure. She has no tenderness over the proximal biceps. She has a full active range of motion of the elbow, wrist and hand. Range of motion  (in degrees)   Right Left   ABDUCTION       EXT. ROTATION       INT.  ROTATION       FORWARD FLEX    STRENGTH         Provocative Test Positive Negative Not indicated   Spurling Sign [] [x] []   Cross Arm Adduction Test [x] [] []   Apprehension Sign [] [] []   Neer Sign [] [] []   Hess Impingement Sign [x] [] []   Yergason test [] [] []   OKds test [] [] []     Other Provocative tests:     IMAGING STUDIES    X-rays 2 views left shoulder are unremarkable for acute or chronic pathology  X-rays 3 views of her right hip demonstrate severe osteoarthritic change right hip and well-positioned left total hip replacement  X-rays 2 views of both knees demonstrate well-positioned total knee components with no signs of failure    IMPRESSION    Left rotator cuff impingement with partial rotator cuff tear likely persist labral tear  Right hip osteoarthritis  Left total hip replacement status  Bilateral knee pain secondary to referred pain from hips      PLAN    1. Conservative care options including medicines and therapy were discussed. 2.  The indications for therapeutic injections were discussed. 3.  The indications for additional imaging studies were discussed.    4.  After considering the various options discussed, the patient elected to pursue a course that includes an MRI left shoulder and refer to Dr. Cecilia Wiley for evaluation of right hip for consideration of hip replacement surgery

## 2022-05-04 ENCOUNTER — TELEPHONE (OUTPATIENT)
Dept: ORTHOPEDIC SURGERY | Age: 72
End: 2022-05-04

## 2022-05-04 NOTE — TELEPHONE ENCOUNTER
SPOKE WITH PATIENT IN REGARDS TO MRI APPROVAL LEFT SHOULDER. INFORMED PATIENT MRI ORDER ALONG WITH MRI AUTHORIZATION WAS FAXED TO [facility]. INFORMED PATIENT TO CALL AT THEIR CONVENIENCE TO SCHEDULE THAT MRI.  ALSO, INFORMED PATIENT TO CALL OUR SCHEDULING DEPT TO SCHEDULE FOLLOW UP APPOINTMENT  WITH DR LEE TO GO OVER THOSE RESULTS

## 2022-05-11 ENCOUNTER — TELEPHONE (OUTPATIENT)
Dept: ORTHOPEDIC SURGERY | Age: 72
End: 2022-05-11

## 2022-05-11 NOTE — TELEPHONE ENCOUNTER
Left message for patient regarding MRI, Per Dr. Suyapa Sandoval patient needs to follow up with Dr. Jose Cristina - left message for patient to call and scheduled

## 2022-05-19 ENCOUNTER — OFFICE VISIT (OUTPATIENT)
Dept: ORTHOPEDIC SURGERY | Age: 72
End: 2022-05-19
Payer: MEDICARE

## 2022-05-19 VITALS — WEIGHT: 190 LBS | HEIGHT: 63 IN | BODY MASS INDEX: 33.66 KG/M2

## 2022-05-19 DIAGNOSIS — M19.019 GLENOHUMERAL ARTHRITIS: Primary | ICD-10-CM

## 2022-05-19 PROCEDURE — 20610 DRAIN/INJ JOINT/BURSA W/O US: CPT | Performed by: STUDENT IN AN ORGANIZED HEALTH CARE EDUCATION/TRAINING PROGRAM

## 2022-05-19 RX ORDER — LIDOCAINE HYDROCHLORIDE 10 MG/ML
4 INJECTION, SOLUTION INFILTRATION; PERINEURAL ONCE
Status: COMPLETED | OUTPATIENT
Start: 2022-05-19 | End: 2022-05-19

## 2022-05-19 RX ORDER — TRIAMCINOLONE ACETONIDE 40 MG/ML
80 INJECTION, SUSPENSION INTRA-ARTICULAR; INTRAMUSCULAR ONCE
Status: COMPLETED | OUTPATIENT
Start: 2022-05-19 | End: 2022-05-19

## 2022-05-19 RX ADMIN — LIDOCAINE HYDROCHLORIDE 4 ML: 10 INJECTION, SOLUTION INFILTRATION; PERINEURAL at 14:23

## 2022-05-19 RX ADMIN — TRIAMCINOLONE ACETONIDE 80 MG: 40 INJECTION, SUSPENSION INTRA-ARTICULAR; INTRAMUSCULAR at 14:24

## 2022-05-19 NOTE — PROGRESS NOTES
Date:  2022    Name:  Ryan Crew  Address:  [address] [place] [address]    :  1950      Age:   81 y.o.    SSN:  [id number]      Medical Record Number:  [id number]    Reason for Visit:    Chief Complaint    Shoulder Pain (lt shoulder)      DOS:2022     HPI: Saurabh Mcneill is a  right-hand-dominant 81 y.o. female here today for new patient evaluation regarding her left shoulder. The patient denies any injury but has had worsening pain to the left shoulder for 6 months. It bothers her when she reaches overhead she notices some pain in the front of her shoulder. She denies prior problems like this in the past.  She is not having any night symptoms. She denies numbness and tingling down the arm. She was seen by Dr. Pan Poe who got an MRI and referred her to me. She is looking into getting her right hip replaced at [facility] by someone who replaced her left hip earlier. Pain Assessment  Location of Pain: Shoulder  Location Modifiers: Left  Severity of Pain: 5  Quality of Pain: Dull,Aching  Duration of Pain: Other (Comment)  Frequency of Pain: Intermittent  ROS: All systems reviewed on patient intake form. Pertinent items are noted in HPI. Past Medical History:   Diagnosis Date    Arthritis     Hyperlipemia 2013    Primary osteoarthritis of right hip 5/3/2022        Past Surgical History:   Procedure Laterality Date    BREAST LUMPECTOMY      BREAST SURGERY      breast biopsy    COLONOSCOPY      COLONOSCOPY N/A 2019    COLON W/ANES.  (10:00) performed by Jareth Jose DO at [address] on L buttocks had to be debrided and sewn shut    DILATION AND CURETTAGE OF UTERUS      ENDOSCOPY, COLON, DIAGNOSTIC      JOINT REPLACEMENT  2008    bilateral knees       Family History   Problem Relation Age of Onset    Cancer Mother         lung    Heart Attack Father     Cancer Sister         lung    Stroke Sister     Other Brother         liver failure       Social History     Socioeconomic History    Marital status:      Spouse name: None    Number of children: None    Years of education: None    Highest education level: None   Occupational History    None   Tobacco Use    Smoking status: Never Smoker    Smokeless tobacco: Never Used   Vaping Use    Vaping Use: Never used   Substance and Sexual Activity    Alcohol use: No    Drug use: No    Sexual activity: None   Other Topics Concern    None   Social History Narrative    None     Social Determinants of Health     Financial Resource Strain: Low Risk     Difficulty of Paying Living Expenses: Not very hard   Food Insecurity: No Food Insecurity    Worried About Running Out of Food in the Last Year: Never true    Brad of Food in the Last Year: Never true   Transportation Needs:     Lack of Transportation (Medical): Not on file    Lack of Transportation (Non-Medical):  Not on file   Physical Activity:     Days of Exercise per Week: Not on file    Minutes of Exercise per Session: Not on file   Stress:     Feeling of Stress : Not on file   Social Connections:     Frequency of Communication with Friends and Family: Not on file    Frequency of Social Gatherings with Friends and Family: Not on file    Attends [employer] Services: Not on file    Active Member of [address] [place] [address] or Organizations: Not on file    Attends Club or Organization Meetings: Not on file    Marital Status: Not on file   Intimate Partner Violence:     Fear of Current or Ex-Partner: Not on file    Emotionally Abused: Not on file    Physically Abused: Not on file    Sexually Abused: Not on file   Housing Stability:     Unable to Pay for Housing in the Last Year: Not on file    Number of [place] in the Last Year: Not on file    Unstable Housing in the Last Year: Not on file       Current Outpatient Medications   Medication Sig Dispense Refill    pravastatin (PRAVACHOL) 40 MG tablet TAKE 1 TABLET BY MOUTH IN THE EVENING FOR HIGH CHOLESTEROL 90 tablet 1    aspirin 81 MG tablet Take 81 mg by mouth daily       No current facility-administered medications for this visit. Allergies   Allergen Reactions    Tylox [Oxycodone-Acetaminophen]     Demerol Hcl [Meperidine]     Dye [Iodides] Hives     IVP before 1990       Vital signs: There were no vitals taken for this visit. Left shoulder exam    Inspection:  Held in a normal posture. Normal contour at the acromioclavicular joint. No swelling, ecchymosis, or erythema about the shoulder. No atrophy appreciated. No scapular winging. Palpation: No subacromial crepitation noted. Tenderness noted over the anterior shoulder at the lesser tuberosity. Mild tenderness to the bicipital groove and greater tuberosity footprint. Range of Motion: Full passive and active ROM. Normal scapulothoracic rhythm. Strength:  Normal supraspinatus, infraspinatus, and subscapularis muscle strength. Stability: No anterior instability. No posterior instability. Special Tests: Impingement findings are negative. Labral findings are negative. Speed sign and Yergason signs are both negative. Crossover sign is negative. Belly press sign is negative. Lift off sign is negative. Other findings: The skin is warm dry and well perfused. 2+ radial pulse. Sensation is intact to light touch over the deltoid. Right comparison shoulder exam    Inspection:  Held in a normal posture. Normal contour at the acromioclavicular joint. No swelling, ecchymosis, or erythema about the shoulder. No atrophy appreciated. No scapular winging. Palpation:  No subacromial crepitus. No tenderness of the AC joint. No greater tuberosity tenderness. No tenderness in the bicipital groove. Range of Motion: Full passive and active ROM. Normal scapulothoracic rhythm. Strength:  Normal supraspinatus, infraspinatus, and subscapularis muscle strength.     Stability: No anterior instability. No posterior instability. Special Tests: Impingement findings are negative. Labral findings are negative. Speed sign and Yergason signs are both negative. Crossover sign is negative. Belly press sign is negative. Lift off sign is negative. Other findings: The skin is warm dry and well perfused. 2+ radial pulse. Sensation is intact to light touch over the deltoid. Diagnostics:  Radiology:       No new x-rays taken today. Prior x-rays demonstrate minimal arthritic change to the [facility] joint or the glenohumeral joint. MRI of the left shoulder from 511/22:    CONCLUSION:   1. Diffuse glenohumeral capsulitis, with slight thickening of the axillary capsule; suggestive    of clinical adhesive capsulitis. 2. Chronic partial-thickness tear of the supraspinatus tendon anterior footprint, approximately    1 cm in size, with mild tendinopathy of the supraspinatus remnant adjacent infraspinatus    tendon. 3. Mild tendinopathy and thin, 1 cm humeral sided delamination of the subscapularis tendon    insertion.  Adjacent mild tendinopathy of the medially perched long head biceps tendon. 4. Moderate glenohumeral joint arthrosis, with degenerative fraying of the posterosuperior    glenoid labrum. 5. Mild to moderate AC joint arthrosis, without acute AC joint separation or injury.             Assessment: 81 y.o. female with left shoulder mild glenohumeral arthritis and capsulitis    Plan: Pertinent imaging was reviewed. The etiology, natural history, and treatment options for the disorder were discussed. The roles of activity medication, antiinflammatories, injections, bracing, physical therapy, and surgical interventions were all described to the patient and questions were answered. The patient has a very good shoulder overall. She has good strength and range of motion. She does have some nagging tenderness to the anterior lateral shoulder.   This is likely secondary to a mild arthritis flare and capsulitis. I will do a glenohumeral injection today. She can follow-up with me as needed. Lyndsey Hector is in agreement with this plan. All questions were answered to patient's satisfaction and was encouraged to call with any further questions. The indications and risks of steroid injection as well as treatment alternatives were discussed with the patient who consented to the procedure. Under sterile conditions and after informed consent was obtained, using posterior approach the patient was given an injection into the left shoulder glenohumeral joint. 2mL 40 mg of Kenalog and 4 mL of 1% lidocaine were placed in the shoulder after it was prepped with chlorhexidine. This resulted in good relief of symptoms. There were no complications. The patient was advised to ice the shoulder this evening and to avoid vigorous activities for the next 2 days. They were advised to call us if there was any erythema, enduration, swelling or increasing pain. Ken Jarrell, DO  Orthopedic Surgery and Sports Medicine  5/19/2022    No orders of the defined types were placed in this encounter.

## 2022-06-15 ENCOUNTER — CLINICAL DOCUMENTATION (OUTPATIENT)
Dept: OTHER | Age: 72
End: 2022-06-15

## 2022-06-23 ENCOUNTER — OFFICE VISIT (OUTPATIENT)
Dept: ORTHOPEDIC SURGERY | Age: 72
End: 2022-06-23
Payer: MEDICARE

## 2022-06-23 VITALS — BODY MASS INDEX: 33.66 KG/M2 | WEIGHT: 190 LBS | HEIGHT: 63 IN

## 2022-06-23 DIAGNOSIS — M16.11 PRIMARY OSTEOARTHRITIS OF RIGHT HIP: ICD-10-CM

## 2022-06-23 DIAGNOSIS — M25.551 RIGHT HIP PAIN: Primary | ICD-10-CM

## 2022-06-23 DIAGNOSIS — M25.851 FEMOROACETABULAR IMPINGEMENT OF RIGHT HIP: ICD-10-CM

## 2022-06-23 DIAGNOSIS — E66.09 CLASS 1 OBESITY DUE TO EXCESS CALORIES WITHOUT SERIOUS COMORBIDITY WITH BODY MASS INDEX (BMI) OF 33.0 TO 33.9 IN ADULT: ICD-10-CM

## 2022-06-23 PROCEDURE — 20611 DRAIN/INJ JOINT/BURSA W/US: CPT | Performed by: ORTHOPAEDIC SURGERY

## 2022-06-23 RX ORDER — BUPIVACAINE HYDROCHLORIDE 2.5 MG/ML
10 INJECTION, SOLUTION INFILTRATION; PERINEURAL ONCE
Status: COMPLETED | OUTPATIENT
Start: 2022-06-23 | End: 2022-06-23

## 2022-06-23 RX ORDER — BETAMETHASONE SODIUM PHOSPHATE AND BETAMETHASONE ACETATE 3; 3 MG/ML; MG/ML
2.5 INJECTION, SUSPENSION INTRA-ARTICULAR; INTRALESIONAL; INTRAMUSCULAR; SOFT TISSUE ONCE
Status: COMPLETED | OUTPATIENT
Start: 2022-06-23 | End: 2022-06-23

## 2022-06-23 RX ORDER — LIDOCAINE HYDROCHLORIDE 10 MG/ML
10 INJECTION, SOLUTION INFILTRATION; PERINEURAL ONCE
Status: COMPLETED | OUTPATIENT
Start: 2022-06-23 | End: 2022-06-23

## 2022-06-23 RX ADMIN — BETAMETHASONE SODIUM PHOSPHATE AND BETAMETHASONE ACETATE 2.5 MG: 3; 3 INJECTION, SUSPENSION INTRA-ARTICULAR; INTRALESIONAL; INTRAMUSCULAR; SOFT TISSUE at 09:40

## 2022-06-23 RX ADMIN — LIDOCAINE HYDROCHLORIDE 10 MG: 10 INJECTION, SOLUTION INFILTRATION; PERINEURAL at 09:40

## 2022-06-23 RX ADMIN — BUPIVACAINE HYDROCHLORIDE 10 MG: 2.5 INJECTION, SOLUTION INFILTRATION; PERINEURAL at 09:40

## 2022-06-23 NOTE — PROGRESS NOTES
Dr Vivi Flynn      Date /Time 6/23/2022       9:26 AM EDT  Name Amira Olivera             1950   Location  [facility]  MRN [id number]                Chief Complaint   Patient presents with    Hip Pain     RIGHT HIP         History of Present Illness    Amira Olivera is a 81 y.o. female who presents with  right hip pain. Sent in consultation by Chacorta Alaniz DO,. Occupation: Retired  Injury Mechanism:  none. Worker's Comp. & legal issues:   none. Previous Treatments: Ice, Heat and NSAIDs    Patient presents to the office today with a chief complaint of right knee pain. Patient did have a right total knee arthroplasty done by Dr. Brooklyn Castaneda in 2008. The knee has done well until recently. She is developing increasing knee pain and hip pain. The hip is only minimally symptomatic. Patient did see Dr. Brooklyn Castaneda who suggested that the knee pain is referred from the hip. She is here for further discussion concerning the etiology of her pain. No injury or trauma. Patient denies any lumbar pain or radicular symptoms. Past History  Past Medical History:   Diagnosis Date    Arthritis     Hyperlipemia 5/7/2013    Primary osteoarthritis of right hip 5/3/2022     Past Surgical History:   Procedure Laterality Date    BREAST LUMPECTOMY      BREAST SURGERY      breast biopsy    COLONOSCOPY      COLONOSCOPY N/A 1/24/2019    COLON W/ANES.  (10:00) performed by Raul Harp DO at [address] on L buttocks had to be debrided and sewn shut    DILATION AND CURETTAGE OF UTERUS      ENDOSCOPY, COLON, DIAGNOSTIC      JOINT REPLACEMENT  2008    bilateral knees     Family History   Problem Relation Age of Onset    Cancer Mother         lung    Heart Attack Father     Cancer Sister         lung    Stroke Sister     Other Brother         liver failure     Social History     Tobacco Use    Smoking status: Never Smoker    Smokeless tobacco: Never Used Substance Use Topics    Alcohol use: No      Current Outpatient Medications on File Prior to Visit   Medication Sig Dispense Refill    pravastatin (PRAVACHOL) 40 MG tablet TAKE 1 TABLET BY MOUTH IN THE EVENING FOR HIGH CHOLESTEROL 90 tablet 1    aspirin 81 MG tablet Take 81 mg by mouth daily       No current facility-administered medications on file prior to visit. ASCVD 10-YEAR RISK SCORE  The 10-year ASCVD risk score (Prudence Pinto, et al., 2013) is: 10.6%    Values used to calculate the score:      Age: 81 years      Sex: Female      Is Non- : No      Diabetic: No      Tobacco smoker: No      Systolic Blood Pressure: 517 mmHg      Is BP treated: No      HDL Cholesterol: 52 mg/dL      Total Cholesterol: 221 mg/dL   . Review of Systems  10-point ROS is negative other than HPI. Physical Exam  Based off 1997 Exam Criteria  Ht 5' 3\" (1.6 m)   Wt 190 lb (86.2 kg)   BMI 33.66 kg/m²      Constitutional:       General: He is not in acute distress. Appearance: Normal appearance. Cardiovascular:      Rate and Rhythm: Normal rate and regular rhythm. Pulses: Normal pulses. Pulmonary:      Effort: Pulmonary effort is normal. No respiratory distress. Neurological:      Mental Status: He is alert and oriented to person, place, and time. Mental status is at baseline.      Musculoskeletal:  Gait:  antalgic    Spine / Hip Exam:      RIGHT  LEFT    Lumbar Spine Exam  [x] All Neg    [x] All Neg     Straight leg raise  []  []Not tested   []  []Not tested    Clonus  []  []Not tested   []  []Not tested    Pain with motion  []  []Not tested   []  []Not tested    Radiculopathy  []  []Not tested   []  []Not tested    Paraspinal muscle tenderness  [] Paraspinal  []Midline   [] Paraspinal  []Midline   Sensation RIGHT  LEFT    L3  [x] Normal []Decreased    [x] Normal []Decreased   L4  [x] Normal  []Decreased   [x] Normal []Decreased   L5  [x] Normal []Decreased   [x] Normal []Decreased S1  [x] Normal  []Decreased   [x] Normal []Decreased   Pelvis       Scoliosis  [x] Nml  [] Present     Leg-length discrepency  [x] Equal  [] Right longer   [] Left longer   Range of Motion Active Passive Active Passive   Hip Flexion 100  110    Abduction 30  40    External Rotation @ 90 flex 45  45    Internal Rotation @ 90 flex 10  20           Hip Impingement / Dysplasia  [] All Neg  [] Not tested   [x] All Neg  [] Not tested    Hip impingement test  [x]  []Not tested   []  []Not tested    C-sign  [x]  []Not tested   []  []Not tested    Anterior instability apprehension  []  []Not tested   []  []Not tested    Posterior instability apprehension  []  []Not tested   []  []Not tested    Uncontained Internal rotation  []  []Not tested  []  []Not tested          Abductors  [x] All Neg  [] Not tested   [x] All Neg  [] Not tested    Medius strength  []  []Not tested   []  []Not tested    Minimum strength  []  []Not tested   []  []Not tested    IT band tendonitis  []  []Not tested   []  []Not tested    Trochanteric tenderness  []  []Not tested  []  []Not tested   Sciatic neuropathic pain  []  []Not tested   []  []Not tested           Post-arthroplasty  [] All Neg  [] Not tested   [] All Neg  [] Not tested    Rectus tendonitis  []  []Not tested   []  []Not tested    Iliopsoas tendonitis       Start-up pain  []  []Not tested   []  []Not tested      Physical exam of the right knee demonstrates an incision consistent with total knee arthroplasty. No significant swelling. Range of motion 0-110. Mild instability to varus and valgus stress increased in mid flexion versus extension. Imaging    Right Hip: [address] [facility]  Radiographs: X-rays were ordered today and reviewed of the right hip.  3 views. AP pelvis, lateral, and false profile. They demonstrate end-stage osteoarthritis right hip without evidence of fractures or dislocations. Significant lumbar DDD also seen.     Previous x-rays of the right knee were also reviewed today. No gross evidence of loosening or eccentric wear. Procedure:  Orders Placed This Encounter   Procedures    XR HIP RIGHT (2-3 VIEWS)     Standing Status:   Future     Number of Occurrences:   1     Standing Expiration Date:   7/23/2022     Order Specific Question:   Reason for exam:     Answer:   PAIN    US ARTHR/ASP/INJ MAJOR JNT/BURSA RIGHT     Standing Status:   Future     Number of Occurrences:   1     Standing Expiration Date:   7/23/2022     Order Specific Question:   Reason for exam:     Answer:   PAIN     Right Hip Cortisone Injection - Ultrasound-guided CPT: 70046   Consent was obtained after discussion of the risks, benefits, alternatives, including, but not limited to bleeding, pain, infection, skin disruption or discoloration. Laterality was confirmed (timeout). The hip was prepped with alcohol.  Deep ultrasound was used to visualize the femoral head, neck, and acetabular rim. 22-gauge spinal needle was used. I confirmed transducer orientation along the axis of the femoral neck. [employer] ultrasound unit was used with a variable frequency (6.0-15.0 MHz) linear transducer. Ultrasound-guided needle localization to the hip joint was performed. Confirmation of needle placement was performed, and the image was stored  A formulation of 2cc of Celestone, 1cc of 1% lidocaine, 1cc of 0.25% sensoricaine was injected into the hip. Appropriate insufflation deep to the hip capsule was visualized. The site was cleaned and dressed with a band aid. Images were taken and saved for permanent record.  She tolerated this well and there were no complications. 80% of her pain was relieved after injection. Assessment and Plan  Gabriel Cunningham was seen today for hip pain. Diagnoses and all orders for this visit:    Right hip pain  -     XR HIP RIGHT (2-3 VIEWS); Future    Femoroacetabular impingement of right hip  -     US ARTHR/ASP/INJ MAJOR JNT/BURSA RIGHT;  Future    Primary osteoarthritis of right hip    Class 1 obesity due to excess calories without serious comorbidity with body mass index (BMI) of 33.0 to 33.9 in adult    Other orders  -     bupivacaine (MARCAINE) 0.25 % injection 10 mg  -     lidocaine 1 % injection 10 mg  -     betamethasone acetate-betamethasone sodium phosphate (CELESTONE) injection 2.5 mg        Patient did have a selective local anesthetic with a cortisone injection done under ultrasound into her right hip today. This was done for both diagnostic and therapeutic purposes. She states her pain is at least 80% better before she left the office. The reduction in pain symptoms was both knee and hip. This was approximately 15 minutes after the injection. The next appropriate step for the patient would be a total hip arthroplasty. Patient will follow-up in 3 months or sooner if problems arise peer    I discussed with Sharyn Styles that her history, symptoms, signs and imaging are most consistent with hip arthritis    We reviewed the natural history of these conditions and treatment options ranging from conservative measures (rest, icing, activity modification, physical therapy, pain meds, cortisone injection) to surgical options. In terms of treatment, I recommended continuing with rest, icing, avoidance of painful activities, NSAIDs or pain meds as tolerated, and physical therapy. If these are not effective, cortisone injection can be considered. We discussed surgical options as well, should conservative measures fail. Electronically signed by Chi Mcarthur PA-C on 6/23/2022 at 9:26 AM  This dictation was generated by voice recognition computer software. Although all attempts are made to edit the dictation for accuracy, there may be errors in the transcription that are not intended.

## 2022-07-06 DIAGNOSIS — E78.5 HYPERLIPIDEMIA, UNSPECIFIED HYPERLIPIDEMIA TYPE: ICD-10-CM

## 2022-07-06 LAB
A/G RATIO: 1.9 (ref 1.1–2.2)
ALBUMIN SERPL-MCNC: 4.6 G/DL (ref 3.4–5)
ALP BLD-CCNC: 69 U/L (ref 40–129)
ALT SERPL-CCNC: 34 U/L (ref 10–40)
ANION GAP SERPL CALCULATED.3IONS-SCNC: 14 MMOL/L (ref 3–16)
AST SERPL-CCNC: 27 U/L (ref 15–37)
BILIRUB SERPL-MCNC: 0.5 MG/DL (ref 0–1)
BILIRUBIN DIRECT: <0.2 MG/DL (ref 0–0.3)
BILIRUBIN, INDIRECT: NORMAL MG/DL (ref 0–1)
BUN BLDV-MCNC: 12 MG/DL (ref 7–20)
CALCIUM SERPL-MCNC: 9.8 MG/DL (ref 8.3–10.6)
CHLORIDE BLD-SCNC: 104 MMOL/L (ref 99–110)
CHOLESTEROL, TOTAL: 235 MG/DL (ref 0–199)
CO2: 26 MMOL/L (ref 21–32)
CREAT SERPL-MCNC: 0.8 MG/DL (ref 0.6–1.2)
GFR AFRICAN AMERICAN: >60
GFR NON-AFRICAN AMERICAN: >60
GLUCOSE BLD-MCNC: 91 MG/DL (ref 70–99)
HCT VFR BLD CALC: 49.2 % (ref 36–48)
HDLC SERPL-MCNC: 63 MG/DL (ref 40–60)
HEMOGLOBIN: 16.1 G/DL (ref 12–16)
LDL CHOLESTEROL CALCULATED: 145 MG/DL
MCH RBC QN AUTO: 30.1 PG (ref 26–34)
MCHC RBC AUTO-ENTMCNC: 32.7 G/DL (ref 31–36)
MCV RBC AUTO: 92.2 FL (ref 80–100)
PDW BLD-RTO: 15.4 % (ref 12.4–15.4)
PLATELET # BLD: 191 K/UL (ref 135–450)
PMV BLD AUTO: 9.8 FL (ref 5–10.5)
POTASSIUM SERPL-SCNC: 4.6 MMOL/L (ref 3.5–5.1)
RBC # BLD: 5.34 M/UL (ref 4–5.2)
SODIUM BLD-SCNC: 144 MMOL/L (ref 136–145)
TOTAL PROTEIN: 7 G/DL (ref 6.4–8.2)
TRIGL SERPL-MCNC: 133 MG/DL (ref 0–150)
TSH SERPL DL<=0.05 MIU/L-ACNC: 8.5 UIU/ML (ref 0.27–4.2)
VLDLC SERPL CALC-MCNC: 27 MG/DL
WBC # BLD: 7.1 K/UL (ref 4–11)

## 2022-07-28 ENCOUNTER — OFFICE VISIT (OUTPATIENT)
Dept: FAMILY MEDICINE CLINIC | Age: 72
End: 2022-07-28
Payer: MEDICARE

## 2022-07-28 VITALS
WEIGHT: 198 LBS | OXYGEN SATURATION: 95 % | DIASTOLIC BLOOD PRESSURE: 98 MMHG | HEART RATE: 86 BPM | SYSTOLIC BLOOD PRESSURE: 149 MMHG | HEIGHT: 60 IN | BODY MASS INDEX: 38.87 KG/M2 | TEMPERATURE: 97.6 F

## 2022-07-28 DIAGNOSIS — R79.89 ELEVATED TSH: ICD-10-CM

## 2022-07-28 DIAGNOSIS — E66.01 SEVERE OBESITY (BMI 35.0-39.9) WITH COMORBIDITY (HCC): ICD-10-CM

## 2022-07-28 DIAGNOSIS — E78.5 HYPERLIPIDEMIA, UNSPECIFIED HYPERLIPIDEMIA TYPE: Primary | ICD-10-CM

## 2022-07-28 DIAGNOSIS — I10 PRIMARY HYPERTENSION: ICD-10-CM

## 2022-07-28 PROCEDURE — 1123F ACP DISCUSS/DSCN MKR DOCD: CPT | Performed by: FAMILY MEDICINE

## 2022-07-28 PROCEDURE — 3017F COLORECTAL CA SCREEN DOC REV: CPT | Performed by: FAMILY MEDICINE

## 2022-07-28 PROCEDURE — 1090F PRES/ABSN URINE INCON ASSESS: CPT | Performed by: FAMILY MEDICINE

## 2022-07-28 PROCEDURE — G8417 CALC BMI ABV UP PARAM F/U: HCPCS | Performed by: FAMILY MEDICINE

## 2022-07-28 PROCEDURE — G8427 DOCREV CUR MEDS BY ELIG CLIN: HCPCS | Performed by: FAMILY MEDICINE

## 2022-07-28 PROCEDURE — G8399 PT W/DXA RESULTS DOCUMENT: HCPCS | Performed by: FAMILY MEDICINE

## 2022-07-28 PROCEDURE — 99214 OFFICE O/P EST MOD 30 MIN: CPT | Performed by: FAMILY MEDICINE

## 2022-07-28 PROCEDURE — 1036F TOBACCO NON-USER: CPT | Performed by: FAMILY MEDICINE

## 2022-07-28 RX ORDER — PRAVASTATIN SODIUM 80 MG/1
TABLET ORAL
Qty: 90 TABLET | Refills: 1 | Status: SHIPPED | OUTPATIENT
Start: 2022-07-28

## 2022-07-28 ASSESSMENT — ENCOUNTER SYMPTOMS
ABDOMINAL PAIN: 0
WHEEZING: 0
BACK PAIN: 0
COUGH: 0
SHORTNESS OF BREATH: 0
CHOKING: 0
STRIDOR: 0
CHEST TIGHTNESS: 0

## 2022-07-28 NOTE — PROGRESS NOTES
Subjective:      Patient ID: Christiano Jang is a 81 y.o. female. HPI Lurdes President is here for follow-up on hyperlipidemia whose control is poor. I increased her pravastatin dose to 80 mg daily she remains obese. She has a small increase in her TSH so I wrote her an order for free T4 for follow-up. Her blood pressure is uncharacteristically elevated. I asked her to do her very best to lose 10 to 15 pounds and then recheck in 1 month. I asked her to discontinue eating salty snacks. Review of Systems   Constitutional:  Negative for activity change, appetite change, chills, diaphoresis, fatigue, fever and unexpected weight change. Respiratory:  Negative for cough, choking, chest tightness, shortness of breath, wheezing and stridor. Cardiovascular:  Negative for chest pain, palpitations and leg swelling. Gastrointestinal:  Negative for abdominal pain. Genitourinary:  Negative for difficulty urinating. Musculoskeletal:  Negative for arthralgias and back pain. Skin:  Negative for rash. Neurological:  Negative for dizziness. Objective:   Physical Exam  Vitals and nursing note reviewed. Constitutional:       Appearance: She is well-developed. HENT:      Head: Normocephalic and atraumatic. Right Ear: External ear normal.      Left Ear: External ear normal.      Nose: Nose normal.   Eyes:      Conjunctiva/sclera: Conjunctivae normal.      Pupils: Pupils are equal, round, and reactive to light. Neck:      Thyroid: No thyromegaly. Vascular: No JVD. Trachea: No tracheal deviation. Cardiovascular:      Rate and Rhythm: Normal rate and regular rhythm. Heart sounds: Normal heart sounds. No murmur heard. No friction rub. No gallop. Pulmonary:      Effort: Pulmonary effort is normal. No respiratory distress. Breath sounds: Normal breath sounds. No stridor. No wheezing or rales. Chest:      Chest wall: No tenderness.    Abdominal:      General: Bowel sounds are normal. There is no distension. Palpations: Abdomen is soft. There is no mass. Tenderness: There is no abdominal tenderness. There is no guarding or rebound. Musculoskeletal:         General: No tenderness. Normal range of motion. Cervical back: Normal range of motion and neck supple. Lymphadenopathy:      Cervical: No cervical adenopathy. Skin:     General: Skin is warm and dry. Coloration: Skin is not pale. Findings: No erythema or rash. Neurological:      Mental Status: She is alert and oriented to person, place, and time. Cranial Nerves: No cranial nerve deficit. Motor: No abnormal muscle tone. Coordination: Coordination normal.      Deep Tendon Reflexes: Reflexes are normal and symmetric. Reflexes normal.       Assessment / Plan:       1. Hyperlipidemia, unspecified hyperlipidemia type-poorly controlled. Increase pravastatin to 80 mg    - Lipid Panel; Future  - Hepatic Function Panel; Future    2. Severe obesity (BMI 35.0-39. 9) with comorbidity (HCC)-significantly cut back calories and increase exercise      3. Elevated TSH-ordered free T4 for follow-up    - T4, Free; Future    4. Primary hypertension-recommend weight loss 10 to 15 pounds in absolutely avoiding added salt in diet.

## 2022-07-29 LAB — T4 FREE: 1.2 NG/DL (ref 0.9–1.8)

## 2022-08-01 ENCOUNTER — HOSPITAL ENCOUNTER (OUTPATIENT)
Age: 72
Discharge: HOME | End: 2022-08-01
Payer: MEDICARE

## 2022-08-01 ENCOUNTER — OFFICE VISIT (OUTPATIENT)
Dept: ORTHOPEDIC SURGERY | Age: 72
End: 2022-08-01
Payer: MEDICARE

## 2022-08-01 VITALS — WEIGHT: 198 LBS | HEIGHT: 60 IN | BODY MASS INDEX: 38.87 KG/M2

## 2022-08-01 DIAGNOSIS — M16.11 PRIMARY OSTEOARTHRITIS OF RIGHT HIP: ICD-10-CM

## 2022-08-01 DIAGNOSIS — Z01.818 PREOP TESTING: ICD-10-CM

## 2022-08-01 DIAGNOSIS — Z01.818 PREOP TESTING: Primary | ICD-10-CM

## 2022-08-01 LAB
ABO/RH: NORMAL
ALBUMIN SERPL-MCNC: 4.6 G/DL (ref 3.4–5)
ANION GAP SERPL CALCULATED.3IONS-SCNC: 11 MMOL/L (ref 3–16)
ANTIBODY SCREEN: NORMAL
APTT: 34.8 SEC (ref 23–34.3)
BACTERIA: ABNORMAL /HPF
BASOPHILS ABSOLUTE: 0.1 K/UL (ref 0–0.2)
BASOPHILS RELATIVE PERCENT: 0.9 %
BILIRUBIN URINE: NEGATIVE
BLOOD, URINE: NEGATIVE
BUN BLDV-MCNC: 14 MG/DL (ref 7–20)
CALCIUM SERPL-MCNC: 10 MG/DL (ref 8.3–10.6)
CHLORIDE BLD-SCNC: 107 MMOL/L (ref 99–110)
CLARITY: CLEAR
CO2: 25 MMOL/L (ref 21–32)
COLOR: YELLOW
CREAT SERPL-MCNC: 0.7 MG/DL (ref 0.6–1.2)
EOSINOPHILS ABSOLUTE: 0 K/UL (ref 0–0.6)
EOSINOPHILS RELATIVE PERCENT: 0.5 %
GFR AFRICAN AMERICAN: >60
GFR NON-AFRICAN AMERICAN: >60
GLUCOSE BLD-MCNC: 87 MG/DL (ref 70–99)
GLUCOSE URINE: NEGATIVE MG/DL
HCT VFR BLD CALC: 46.1 % (ref 36–48)
HEMOGLOBIN: 15.5 G/DL (ref 12–16)
INR BLD: 1.05 (ref 0.87–1.14)
KETONES, URINE: NEGATIVE MG/DL
LEUKOCYTE ESTERASE, URINE: ABNORMAL
LYMPHOCYTES ABSOLUTE: 1.6 K/UL (ref 1–5.1)
LYMPHOCYTES RELATIVE PERCENT: 25.3 %
MCH RBC QN AUTO: 30.5 PG (ref 26–34)
MCHC RBC AUTO-ENTMCNC: 33.6 G/DL (ref 31–36)
MCV RBC AUTO: 90.8 FL (ref 80–100)
MICROSCOPIC EXAMINATION: YES
MONOCYTES ABSOLUTE: 0.6 K/UL (ref 0–1.3)
MONOCYTES RELATIVE PERCENT: 8.9 %
NEUTROPHILS ABSOLUTE: 4.1 K/UL (ref 1.7–7.7)
NEUTROPHILS RELATIVE PERCENT: 64.4 %
NITRITE, URINE: NEGATIVE
PDW BLD-RTO: 14.6 % (ref 12.4–15.4)
PH UA: 7 (ref 5–8)
PLATELET # BLD: 206 K/UL (ref 135–450)
PMV BLD AUTO: 9.5 FL (ref 5–10.5)
POTASSIUM SERPL-SCNC: 4.4 MMOL/L (ref 3.5–5.1)
PROTEIN UA: NEGATIVE MG/DL
PROTHROMBIN TIME: 13.5 SEC (ref 11.7–14.5)
RBC # BLD: 5.08 M/UL (ref 4–5.2)
RBC UA: ABNORMAL /HPF (ref 0–4)
RENAL EPITHELIAL, UA: ABNORMAL /HPF (ref 0–1)
SODIUM BLD-SCNC: 143 MMOL/L (ref 136–145)
SPECIFIC GRAVITY UA: 1.01 (ref 1–1.03)
TRANSFERRIN: 284 MG/DL (ref 200–360)
URINE TYPE: ABNORMAL
UROBILINOGEN, URINE: 0.2 E.U./DL
WBC # BLD: 6.4 K/UL (ref 4–11)
WBC UA: ABNORMAL /HPF (ref 0–5)

## 2022-08-01 PROCEDURE — G8399 PT W/DXA RESULTS DOCUMENT: HCPCS | Performed by: PHYSICIAN ASSISTANT

## 2022-08-01 PROCEDURE — 82040 ASSAY OF SERUM ALBUMIN: CPT

## 2022-08-01 PROCEDURE — 85025 COMPLETE CBC W/AUTO DIFF WBC: CPT

## 2022-08-01 PROCEDURE — 85730 THROMBOPLASTIN TIME PARTIAL: CPT

## 2022-08-01 PROCEDURE — 87081 CULTURE SCREEN ONLY: CPT

## 2022-08-01 PROCEDURE — 86901 BLOOD TYPING SEROLOGIC RH(D): CPT

## 2022-08-01 PROCEDURE — 80048 BASIC METABOLIC PNL TOTAL CA: CPT

## 2022-08-01 PROCEDURE — 87086 URINE CULTURE/COLONY COUNT: CPT

## 2022-08-01 PROCEDURE — 81001 URINALYSIS AUTO W/SCOPE: CPT

## 2022-08-01 PROCEDURE — 84466 ASSAY OF TRANSFERRIN: CPT

## 2022-08-01 PROCEDURE — 85610 PROTHROMBIN TIME: CPT

## 2022-08-01 PROCEDURE — 3017F COLORECTAL CA SCREEN DOC REV: CPT | Performed by: PHYSICIAN ASSISTANT

## 2022-08-01 PROCEDURE — 86900 BLOOD TYPING SEROLOGIC ABO: CPT

## 2022-08-01 PROCEDURE — 1123F ACP DISCUSS/DSCN MKR DOCD: CPT | Performed by: PHYSICIAN ASSISTANT

## 2022-08-01 PROCEDURE — 93005 ELECTROCARDIOGRAM TRACING: CPT | Performed by: PHYSICIAN ASSISTANT

## 2022-08-01 PROCEDURE — 86850 RBC ANTIBODY SCREEN: CPT

## 2022-08-01 PROCEDURE — 36415 COLL VENOUS BLD VENIPUNCTURE: CPT

## 2022-08-01 PROCEDURE — 99215 OFFICE O/P EST HI 40 MIN: CPT | Performed by: PHYSICIAN ASSISTANT

## 2022-08-01 PROCEDURE — G8427 DOCREV CUR MEDS BY ELIG CLIN: HCPCS | Performed by: PHYSICIAN ASSISTANT

## 2022-08-01 PROCEDURE — G8417 CALC BMI ABV UP PARAM F/U: HCPCS | Performed by: PHYSICIAN ASSISTANT

## 2022-08-01 PROCEDURE — 83036 HEMOGLOBIN GLYCOSYLATED A1C: CPT

## 2022-08-01 PROCEDURE — 1036F TOBACCO NON-USER: CPT | Performed by: PHYSICIAN ASSISTANT

## 2022-08-01 PROCEDURE — 1090F PRES/ABSN URINE INCON ASSESS: CPT | Performed by: PHYSICIAN ASSISTANT

## 2022-08-01 NOTE — PROGRESS NOTES
Andra Alar    Age 81 y.o.    female    1950    MRN [id number]    10/3/2022  Arrival Time_____________  OR Time____________[address]     Procedure(s):  RIGHT TOTAL HIP ARTHROPLASTY MINIMALLY INVASIVE DIRECT ANTERIOR                 INGRID BIOMET                      General   Surgeon(s): Angel Luis Barnes, MD      DAY ADMIT ___  SDS/OP ___  OUTPT IN BED ___        Phone [phone] (home)     PCP _____________________ Phone_________________ Epic ( ) Epic CE ( ) Appt ________    ADDITIONAL INFO __________________________________ Cardio/Consult _____________    NOTES _____________________________________________________________________    ____________________________________________________________________________    PAT APPT DATE:________ TIME: ________  FAXED QAD: _______  (__) H&P w/ Hospitalist  __________________________________________________________________________  Preop Nurse phone screen complete: _____________  (__) CBC     (__) W/ DIFF ___________     (__) Hgb A1C    ___________  (__) CHEST X RAY   __________  (__) LIPID PROFILE  ___________  (__) EKG   __________  (__) PT/PTT   ___________  (__) PFT's   __________  (__) BMP   ___________  (__) CAROTIDS  __________  (__) CMP   ___________  (__) VEIN MAPPING  __________  (__) U/A   ___________  (__) HISTORY & PHYSICAL __________  (__) URINE C & S  ___________  (__) CARDIAC CLEARANCE __________  (__) U/A W/ FLEX  ___________  (__) PULM.  CLEARANCE __________  (__) SERUM PREGNANCY ___________  (__) Check Epic DOS orders __________  (__) TYPE & SCREEN __________repeat ( ) (__)  __________________ __________  (__) ALBUMIN Daisy Juan ___________  (__)  __________________ __________  (__) TRANSFERRIN  ___________  (__)  __________________ __________  (__) LIVER PROFILE  ___________  (__)  __________________ __________  (__) MRSA NASAL SWAB ___________  (__) URINE PREG DOS __________  (__) SED RATE  ___________  (__) BLOOD SUGAR DOS __________  (__) C-REACTIVE PROTEIN ___________    (__) VITAMIN D HYDROXY ___________  (__) BLOOD THINNERS __________    (__) ACE/ ARBS: _____________________     (__) BETABLOCKERS __________________

## 2022-08-01 NOTE — LETTER
[facility]  Surgery Scheduling Form:    22     DEMOGRAPHICS    Patient Name:  Deniz Gupta  Patient :  1950   Patient SS#:  [id number]    Patient Phone:  [phone] (home)  Alt. Patient Phone:    Patient Address:  [address] [place] [address] [place] [address]    PCP:  Radha Hirsch DO  Insurance:  Payor: [facility] / Plan: [facility] HMO / Product Type: *No Product type* /   Insurance ID Number:  Payer/Plan Subscr  Sex Relation Sub. Ins. ID Effective Group Num   1. [employer] Alert 1950 Female Self [id number]                                   [address] [address]       DIAGNOSIS & PROCEDURE    Diagnosis: Right Hip   M16.11 Right hip primary osteoarthritis    Operation: RIGHT  91099 Total Hip Arthroplasty Minimally-Invasive Direct Anterior     Provider:  Everette Zepeda MD    Location:  [facility]      Requested Date:  10/03/2022  Requested Time:  10:00 am       Patient Arrival Time:  8:00 am   OR Time Required:  100 Minutes  Admission:  []Outpatient   []23 hour  [x]Same Day Admit:   1-2  days  []Inpatient    Anesthesia:  [x]General  []Spinal  []MAC/Sedation  Regional Anesthesia:  [x]None  []OrthoMix  []Exparel  []Fascia Iliaca / PENG       EQUIPMENT    Position:  [x]Supine  []Lateral  []Beach-chair  []Prone    OR Bed:  []Regular  [x]Furlong  []Phil  []Hip lawson  []Beach-chair  []Spyder  Radiology:  [x]Large C-arm  []Small C-arm  []Portable X-ray    Implants:  [employer] Hip:  [x]Primary Set  []Revision Set  Medacta Hip:  []Dual-modular acetabulum (DM)    SUTURE: []#5 Ethibond  [x]#2 Ethibond  [x]#2 Quill  []#1 PDS  [x]#1 Vicryl                   [x]2-0 Vicryl  [x]3-0 Monocryl  []2-0 Nylon  []3-0 Nylon  []3-0 PDS                    []Dermabond  []Steri-strips (in half)  DRESSING:  [x]Prineo dermabond  []4x4 gauze  [x]ABDs  [x]Tegaderm  BRACE: []Pelvic Binder  []Hip X-ACT  []Knee TROM  []Knee immobilizer                 []Shoulder Immob. (w/abd. pillow) []Sling  []Ice Unit  []Ace-Wrap      [x][employer]ira [employer]:  Indio Car [phone], [email or web address]  []Medacta: Renu Laurent [phone], [email or web address]  []Fx Shoulder: Gustavo Caceres [phone], [email or web address]  []Alejandro: Jose Maki [phone], [email or web address]    Comments:        Vikash Guzman MD  [address] [facility]  8/1/2022       12:48 PM EDT

## 2022-08-01 NOTE — PROGRESS NOTES
Dr Gordy Schmidt      Date /Time 8/1/2022       9:26 AM EDT  Name Nilsa Steven             1950   Location  [facility]  MRN [id number]                Chief Complaint   Patient presents with    Hip Pain     CK RIGHT HIP         History of Present Illness    Nilsa Steven is a 81 y.o. female who presents with  right hip pain. Occupation: Retired  Injury Mechanism:  none. Worker's Comp. & legal issues:   none. Previous Treatments: Ice, Heat and NSAIDs    Patient presents to the office stating for a follow-up visit. Patient is here with a chief complaint of right knee pain. She did have a total knee arthroplasty done by Dr. Sharol Lombard and 2008. We did give her an intra-articular right hip injection at her last visit for diagnostic and therapeutic reasons. Patient had approximately 80% relief of her knee pain with the hip injection. We had recommended a total hip arthroplasty as her next course of action    Previous history: Patient presents to the office today with a chief complaint of right knee pain. Patient did have a right total knee arthroplasty done by Dr. Sharol Lombard in 2008. The knee has done well until recently. She is developing increasing knee pain and hip pain. The hip is only minimally symptomatic. Patient did see Dr. Sharol Lombard who suggested that the knee pain is referred from the hip. She is here for further discussion concerning the etiology of her pain. No injury or trauma. Patient denies any lumbar pain or radicular symptoms. Past History  Past Medical History:   Diagnosis Date    Arthritis     Hyperlipemia 5/7/2013    Primary osteoarthritis of right hip 5/3/2022     Past Surgical History:   Procedure Laterality Date    BREAST LUMPECTOMY      BREAST SURGERY      breast biopsy    COLONOSCOPY      COLONOSCOPY N/A 1/24/2019    COLON W/ANES.  (10:00) performed by Isabel Bhakta DO at [address] on L buttocks had to be debrided and sewn shut    DILATION AND CURETTAGE OF UTERUS      ENDOSCOPY, COLON, DIAGNOSTIC      JOINT REPLACEMENT  2008    bilateral knees     Family History   Problem Relation Age of Onset    Cancer Mother         lung    Heart Attack Father     Cancer Sister         lung    Stroke Sister     Other Brother         liver failure     Social History     Tobacco Use    Smoking status: Never    Smokeless tobacco: Never   Substance Use Topics    Alcohol use: No      Current Outpatient Medications on File Prior to Visit   Medication Sig Dispense Refill    pravastatin (PRAVACHOL) 80 MG tablet Take 1 every evening for high cholesterol 90 tablet 1     No current facility-administered medications on file prior to visit. ASCVD 10-YEAR RISK SCORE  The 10-year ASCVD risk score (Vincent Augustine, et al., 2013) is: 12.8%    Values used to calculate the score:      Age: 81 years      Sex: Female      Is Non- : No      Diabetic: No      Tobacco smoker: No      Systolic Blood Pressure: 931 mmHg      Is BP treated: No      HDL Cholesterol: 63 mg/dL      Total Cholesterol: 235 mg/dL   . Review of Systems  10-point ROS is negative other than HPI. Physical Exam  Based off 1997 Exam Criteria  Ht 5' (1.524 m)   Wt 198 lb (89.8 kg)   BMI 38.67 kg/m²      Constitutional:       General: He is not in acute distress. Appearance: Normal appearance. Cardiovascular:      Rate and Rhythm: Normal rate and regular rhythm. Pulses: Normal pulses. Pulmonary:      Effort: Pulmonary effort is normal. No respiratory distress. Neurological:      Mental Status: He is alert and oriented to person, place, and time. Mental status is at baseline.      Musculoskeletal:  Gait:  antalgic    Spine / Hip Exam:      RIGHT  LEFT    Lumbar Spine Exam  [x] All Neg    [x] All Neg     Straight leg raise  []  []Not tested   []  []Not tested    Clonus  []  []Not tested   []  []Not tested    Pain with motion  []  []Not tested   []  []Not tested Hip: [address] [facility]  Previous Radiographs: X-rays were ordered today and reviewed of the right hip.  3 views. AP pelvis, lateral, and false profile. They demonstrate end-stage osteoarthritis right hip without evidence of fractures or dislocations. Significant lumbar DDD also seen. Previous x-rays of the right knee were also reviewed today. No gross evidence of loosening or eccentric wear. Procedure:  Orders Placed This Encounter   Procedures    Culture, Urine     Standing Status:   Future     Number of Occurrences:   1     Standing Expiration Date:   8/1/2023     Order Specific Question:   Specify (ex-cath, midstream, cysto, etc)? Answer:   midstream    Culture, MRSA, Screening     Standing Status:   Future     Number of Occurrences:   1     Standing Expiration Date:   8/1/2023    Urinalysis     Standing Status:   Future     Number of Occurrences:   1     Standing Expiration Date:   8/1/2023    CBC with Auto Differential     Standing Status:   Future     Number of Occurrences:   1     Standing Expiration Date:   9/6/9963    Basic Metabolic Panel     Standing Status:   Future     Number of Occurrences:   1     Standing Expiration Date:   8/1/2023    Hemoglobin A1C     Standing Status:   Future     Number of Occurrences:   1     Standing Expiration Date:   8/1/2023    Protime-INR     Standing Status:   Future     Number of Occurrences:   1     Standing Expiration Date:   8/1/2023     Order Specific Question:   Daily Coumadin Dose? Answer:   unnown    APTT     Standing Status:   Future     Number of Occurrences:   1     Standing Expiration Date:   8/1/2023     Order Specific Question:   Daily Heparin Dose?      Answer:   unknown    Albumin     Standing Status:   Future     Number of Occurrences:   1     Standing Expiration Date:   8/1/2023    Transferrin     Standing Status:   Future     Number of Occurrences:   1     Standing Expiration Date:   8/1/2023    Ambulatory referral to Physical Therapy     Referral Priority:   Routine     Referral Type:   Eval and Treat     Referral Reason:   Specialty Services Required     Requested Specialty:   Physical Therapist     Number of Visits Requested:   1    EKG 12 Lead     Standing Status:   Future     Number of Occurrences:   1     Standing Expiration Date:   8/1/2023     Order Specific Question:   Reason for Exam?     Answer:   Pre-op    Type and Screen     Standing Status:   Future     Number of Occurrences:   1     Standing Expiration Date:   8/1/2023           Assessment and Plan  Viktoria Bradford was seen today for hip pain. Diagnoses and all orders for this visit:    Preop testing  -     Urinalysis; Future  -     Culture, Urine; Future  -     CBC with Auto Differential; Future  -     Basic Metabolic Panel; Future  -     Hemoglobin A1C; Future  -     Protime-INR; Future  -     APTT; Future  -     Albumin; Future  -     Transferrin; Future  -     Culture, MRSA, Screening; Future  -     EKG 12 Lead; Future  -     Type and Screen; Future    Primary osteoarthritis of right hip  -     Urinalysis; Future  -     Culture, Urine; Future  -     CBC with Auto Differential; Future  -     Basic Metabolic Panel; Future  -     Hemoglobin A1C; Future  -     Protime-INR; Future  -     APTT; Future  -     Albumin; Future  -     Transferrin; Future  -     Culture, MRSA, Screening; Future  -     EKG 12 Lead; Future  -     Type and Screen; Future  -     Ambulatory referral to Physical Therapy        Patient did have a selective local anesthetic with a cortisone injection done under ultrasound into her right hip today. This was done for both diagnostic and therapeutic purposes. She states her pain is at least 80% better before she left the office. The reduction in pain symptoms were both knee and hip. Patient will proceed with a right anterior total hip arthroplasty at [facility].   She does understand she is at increased risk of postoperative complications due to cardiac issues and previous abnormal EKG. In addition she is aware there may not be 100% reduction in her pain symptoms involving her knee. She wishes to proceed. Patient will have her preoperative appointment with Dr. Katey Catherine.  I have also sent Bactroban to the patient's pharmacy today    I discussed with Romero Linaresletitia that her history, symptoms, signs, and imaging are most consistent with hip arthritis    We reviewed the natural history of these conditions and treatment options ranging from conservative measures (rest, icing, activity modification, physical therapy, pain meds, cortisone injection)  to surgical options. We had a long discussion with the patient about their hip. We discussed surgical and non surgical options for hip arthritis. The most important thing is to work to maintain their range of motion. Next they can try medications including tylenol and NSAIDs. They can try glucosamine or chondroitin. They should also ice frequently and avoid activities that make their hip hurt. Cortisone injections and Synvisc injections are also options when medicine has failed. We finally discussed surgical options including arthroscopic debridement versus hip replacement. Often the arthritis is too far gone for an arthroscopic debridement and pain relief will be short term. Their ultimate solution will be a hip replacement when they are ready for it. They should put it off until they can no longer stand the pain and when nothing else has worked. Conservative measures have failed. She is not interested in cortisone injections. I think she is an appropriate candidate for surgery due to her ongoing symptoms and dysfunction despite conservative measures. The procedure would be Right anterior   [address]07 Total Hip Arthroplasty    Perioperative considerations include: Pre-operative clearance from medical subspecialty. We reviewed the risks, benefits, alternatives of this approach.  We discussed risks including, but not limited to, bleeding, pain, infection, scarring, damage to the neurovascular structures, blood clots, pulmonary embolus, stiffness, implant instability or loosening, implant failure, incomplete relief of pain, and incomplete return of function. We also reviewed the surgical details, expected recovery, and rehabilitation (6-9 months). She expressed understanding and will undergo preoperative medical evaluation and optimization. I spent 45 minutes reviewing patient's old testing, lab work, and previous visits with Dr. Nelson Elena and Dr. Art Walker. I also reviewed her previous injection records prior to the visit. Electronically signed by Yonis Bennett PA-C on 8/1/2022 at 10:31 AM  This dictation was generated by voice recognition computer software. Although all attempts are made to edit the dictation for accuracy, there may be errors in the transcription that are not intended.

## 2022-08-02 LAB
ESTIMATED AVERAGE GLUCOSE: 116.9 MG/DL
HBA1C MFR BLD: 5.7 %
URINE CULTURE, ROUTINE: NORMAL

## 2022-08-03 LAB — MRSA CULTURE ONLY: NORMAL

## 2022-08-05 LAB
EKG ATRIAL RATE: 75 BPM
EKG DIAGNOSIS: NORMAL
EKG P AXIS: 4 DEGREES
EKG P-R INTERVAL: 158 MS
EKG Q-T INTERVAL: 378 MS
EKG QRS DURATION: 82 MS
EKG QTC CALCULATION (BAZETT): 422 MS
EKG R AXIS: 7 DEGREES
EKG T AXIS: 6 DEGREES
EKG VENTRICULAR RATE: 75 BPM

## 2022-08-07 PROCEDURE — 93010 ELECTROCARDIOGRAM REPORT: CPT | Performed by: INTERNAL MEDICINE

## 2022-08-14 NOTE — PLAN OF CARE
[facility], [address], [address] [place] [address]  Phone: [phone]  Fax [phone]     Physical Therapy Certification    Dear  Deborah Rose PA-C,    We had the pleasure of evaluating the following patient for physical therapy services at [address] [place] [address]. A summary of our findings can be found in the initial assessment below. This includes our plan of care. If you have any questions or concerns regarding these findings, please do not hesitate to contact me at the office phone number checked above. Thank you for the referral.       Physician Signature:_______________________________Date:__________________  By signing above (or electronic signature), therapists plan is approved by physician    Patient: Roberto Ware   : 1950   MRN: [id number]  Referring Physician:  Deborah Rose PA-C      Evaluation Date: 2022      Medical Diagnosis Information:  Diagnosis: M16.11 (ICD-10-CM) - Primary osteoarthritis of right hip   Treatment Diagnosis: M16.11 (ICD-10-CM) - Primary osteoarthritis of right hip, R hip pain M25.551                                         Insurance information: PT Insurance Information: [employer] MC/BMN/ [employer]eds [employer]     Precautions/ Contra-indications: Bone on bone L hip. Latex Allergy:  [x]NO      []YES  Preferred Language for Healthcare:   [x]English       []other:    SUBJECTIVE: Patient stated complaint: Reports ongoing R hip/R knee pain > 6 months. Recent injection R hip helped with both hip and knee. Knee pain has returned. Has R hip THR scheduled for 10/3/22. Denies back pain. Relevant Medical History:OA, Hyperlipidemia, Lumbar DDD with previous steroid injections. L hip replaced , B knee replacement . Functional Disability Index: FOTO: 55/100 Risk : 55/100     Height: 5' Weight: 198 lb  Pain Scale: 2/10 for hip. 3-8/10 for R knee.   Easing factors: rest, lying, elevation. Provocative factors: walking/uneven walking, standing, stairs, sleeping,    Type: []Constant   [x]Intermittent  []Radiating []Localized []other:     Numbness/Tingling: Denies N/T []    Occupation/School: retired     Living Status/Prior Level of Function: Independent with ADLs and IADLs, Lives with . OBJECTIVE:  (insert scanned image of Total Joint Prehab Assessment form)    X-rays of the right hip.  3 views. AP pelvis, lateral, and false profile. They demonstrate end-stage osteoarthritis right hip without evidence of fractures or dislocations. Significant lumbar DDD also seen. Hip flexion 90 R    Hip abduction 30 R              Reflexes/Myotomes:   [x]Dermatomes/Myotomes intact    [x]Reflexes equal and normal bilaterally   []Other:    Joint mobility: NT   []Normal    []Hypo   []Hyper    Palpation: Medial and lateral joint line tenderness. Functional Mobility/Transfers: IND    Posture: WFL. No assistive device with gait. Gait: (include devices/WB status) Antalgic gait pattern. No assistive device. Instructed patient that she will be using a walker initially and instructed on how to use a cane. Orthopedic Special Tests: NT                       [x] Patient history, allergies, meds reviewed. Medical chart reviewed. See intake form. Review Of Systems (ROS):  [x]Performed Review of systems (Integumentary, CardioPulmonary, Neurological) by intake and observation. Intake form has been scanned into medical record. Patient has been instructed to contact their primary care physician regarding ROS issues if not already being addressed at this time.       Co-morbidities/Complexities (which will affect course of rehabilitation):   []None           Arthritic conditions   []Rheumatoid arthritis (M05.9)  [x]Osteoarthritis (M19.91)   Cardiovascular conditions   []Hypertension (I10)  [x]Hyperlipidemia (E78.5)  []Angina pectoris (I20)  []Atherosclerosis (I70)   Musculoskeletal conditions []Disc pathology   []Congenital spine pathologies   []Prior surgical intervention  []Osteoporosis (M81.8)  []Osteopenia (M85.8)   Endocrine conditions   []Hypothyroid (E03.9)  []Hyperthyroid Gastrointestinal conditions   []Constipation (I29.71)   Metabolic conditions   []Morbid obesity (E66.01)  []Diabetes type 1(E10.65) or 2 (E11.65)   []Neuropathy (G60.9)     Pulmonary conditions   []Asthma (J45)  []Coughing   []COPD (J44.9)   Psychological Disorders  []Anxiety (F41.9)  []Depression (F32.9)   []Other:   [x]Other:   Prior B TKR and L THR. Barriers to/and or personal factors that will affect rehab potential:              []Age  []Sex              []Motivation/Lack of Motivation                        [x]Co-Morbidities              []Cognitive Function, education/learning barriers              []Environmental, home barriers              []profession/work barriers  []past PT/medical experience  []other:  Justification:     Falls Risk Assessment (30 days):   [x] Falls Risk assessed and no intervention required.   [] Falls Risk assessed and Patient requires intervention due to being higher risk   TUG score (>12s at risk):     [] Falls education provided, including           ASSESSMENT:    Functional Impairments:     [x]Noted lumbar/proximal hip/LE joint hypomobility   [x]Decreased LE functional ROM   [x]Decreased core/proximal hip strength and neuromuscular control   [x]Decreased LE functional strength   []Reduced balance/proprioceptive control   []other:      Functional Activity Limitations (from functional questionnaire and intake)   []Reduced ability to tolerate prolonged functional positions   [x]Reduced ability or difficulty with changes of positions or transfers between positions   []Reduced ability to maintain good posture and demonstrate good body mechanics with sitting, bending, and lifting   []Reduced ability to sleep   [] Reduced ability or tolerance with driving and/or computer work   []Reduced ability to perform lifting, carrying tasks   [x]Reduced ability to squat   []Reduced ability to forward bend   [x]Reduced ability to ambulate prolonged functional periods/distances/surfaces   [x]Reduced ability to ascend/descend stairs   []Reduced ability to run, hop, cut or jump   []other:    Participation Restrictions   []Reduced participation in self care activities   [x]Reduced participation in home management activities   []Reduced participation in work activities   []Reduced participation in social activities. [x]Reduced participation in sport/recreation activities. Classification :    []Signs/symptoms consistent with post-surgical status including decreased ROM, strength and function.    []Signs/symptoms consistent with joint sprain/strain   []Signs/symptoms consistent with patella-femoral syndrome   [x]Signs/symptoms consistent with knee OA/hip OA   []Signs/symptoms consistent with internal derangement of knee/Hip   []Signs/symptoms consistent with functional hip weakness/NMR control      []Signs/symptoms consistent with tendinitis/tendinosis    []signs/symptoms consistent with pathology which may benefit from Dry needling      []other:      Prognosis/Rehab Potential:      []Excellent   [x]Good    []Fair   []Poor    Tolerance of evaluation/treatment:    []Excellent   [x]Good    []Fair   []Poor  Physical Therapy Evaluation Complexity Justification  [x] A history of present problem with:  [] no personal factors and/or comorbidities that impact the plan of care;  [x]1-2 personal factors and/or comorbidities that impact the plan of care  []3 personal factors and/or comorbidities that impact the plan of care  [x] An examination of body systems using standardized tests and measures addressing any of the following: body structures and functions (impairments), activity limitations, and/or participation restrictions;:  [] a total of 1-2 or more elements   [x] a total of 3 or more elements   [] a total of 4 or more elements   [x] A clinical presentation with:  [x] stable and/or uncomplicated characteristics   [] evolving clinical presentation with changing characteristics  [] unstable and unpredictable characteristics;   [x] Clinical decision making of [x] low, [] moderate, [] high complexity using standardized patient assessment instrument and/or measurable assessment of functional outcome. [x] EVAL (LOW) 69062 (typically 20 minutes face-to-face)  [] EVAL (MOD) 38069 (typically 30 minutes face-to-face)  [] EVAL (HIGH) 53475 (typically 45 minutes face-to-face)  [] RE-EVAL       PLAN:   Frequency/Duration: 1 more visit if needed to review HEP , otherwise D/C with HEP  Interventions:  [x]  Therapeutic exercise including: strength training, ROM, for Lower extremity and core   [x]  NMR activation and proprioception for LE, Glutes and Core   [x]  Manual therapy as indicated for LE, Hip and spine to include: Dry Needling/IASTM, STM, PROM, Gr I-IV mobilizations, manipulation. [x] Modalities as needed that may include: thermal agents, E-stim, Biofeedback, US, iontophoresis as indicated  [x] Patient education on joint protection, postural re-education, activity modification, progression of HEP. [x] Patient appears to be functionally prepared for surgery and will continue with a home exercise program until surgery date. [] Patient will be ready for surgery with a short period of structured physical therapy  [] Patient does not appear to be functionally ready for surgery and requires a prolonged  structure program    At this point, it appears patient's post-operative discharge status from hospital should be:   [x] Home with home exercise program and outpatient PT  [] Home with home health care and   [] Skilled nursing facility/ Inpatient Rehab    HEP instruction: (see scanned forms)  Access Code: [email or web address]  URL: [email or web address]  Date: 08/15/2022  Prepared by: Oanh aWtkins    Exercises  Long Sitting Calf Stretch with Strap - 2 x daily - 7 x weekly - 5 reps - 30 seconds hold  Seated Table Hamstring Stretch - 2 x daily - 7 x weekly - 5 reps - 30 seconds hold  Supine Quadricep Sets - 2 x daily - 7 x weekly - 10 reps - 10 seconds hold  Hip Flexion - 2 x daily - 7 x weekly - 10 reps - 5 second hold  Seated Long Arc Quad - 2 x daily - 7 x weekly - 3 sets - 10 reps - 5 second hold  Mini Squat with Counter Support - 2 x daily - 7 x weekly - 3 sets - 10 reps - 5 hold  Supine Transversus Abdominis Bracing with Pelvic Floor Contraction - 2 x daily - 7 x weekly - 3 sets - 10 reps - 10 hold  Clamshell - 2 x daily - 7 x weekly - 3 sets - 10 reps - 5 hold    GOALS:  Patient stated goal: Prepare for surgery, walking longer distances. Therapist goals for Patient:   Short Term Goals: To be achieved in: 1-2 visits   1. Independent in HEP and progression per patient tolerance, in order to prevent re-injury.    [] Progressing: [x] Met: [] Not Met: [] Adjusted    Electronically signed by:  Geovanni Guillory, [address] OMT-C

## 2022-08-15 ENCOUNTER — HOSPITAL ENCOUNTER (OUTPATIENT)
Dept: PHYSICAL THERAPY | Age: 72
Setting detail: THERAPIES SERIES
Discharge: HOME | End: 2022-08-15
Payer: MEDICARE

## 2022-08-15 PROCEDURE — 97161 PT EVAL LOW COMPLEX 20 MIN: CPT | Performed by: PHYSICAL THERAPIST

## 2022-08-15 PROCEDURE — 97112 NEUROMUSCULAR REEDUCATION: CPT | Performed by: PHYSICAL THERAPIST

## 2022-08-15 PROCEDURE — 97110 THERAPEUTIC EXERCISES: CPT | Performed by: PHYSICAL THERAPIST

## 2022-08-15 NOTE — FLOWSHEET NOTE
[facility], [address] [place]  Phone: [phone]  Fax [phone]    Physical Therapy Daily Treatment Note  Date:  8/15/2022    Patient Name:  Jenn Alex    :  1950  MRN: [id number]  Restrictions/Precautions:    Medical/Treatment Diagnosis Information:  Diagnosis: M16.11 (ICD-10-CM) - Primary osteoarthritis of right hip  Treatment Diagnosis: M16.11 (ICD-10-CM) - Primary osteoarthritis of right hip, R hip pain A34.366  Insurance/Certification information:  PT Insurance Information: [employer] /BMN/ [employer]  Physician Information:   Quang Mae PA-C  Plan of care signed (Y/N):     Date of Patient follow up with Physician: aria scheduled for 10/3/22.       Progress Note: [x]  Yes  []  No  Next due by: Visit #10       Latex Allergy:  [x]NO      []YES  Preferred Language for Healthcare:   [x]English       []other:       Visit # Insurance Allowable Auth Required   In-person 1 BMN []  Yes [x]  No    Telehealth   []  Yes []  No    Total        Pain level:  3-810     SUBJECTIVE:  See eval    OBJECTIVE: See eval  Observation:   Test measurements:      RESTRICTIONS/PRECAUTIONS:     Exercises/Interventions:     Therapeutic Ex Sets/reps Notes        Seated HS stretch 3x30 sec HEP   Seated calf stretch 3x30 sec HEP   Seated QS BLEs 10 sec 10x HEP   Supine heelslide 1x10 HEP   Isometric abdominal 10 sec 10x HEP   LAQ  1x10 HEP   minisquats 1x10 HEP        SL clams no band  1x10 reps HEP                                 Manual Intervention     PROM R hip 5'                             NMR re-education          Patient education regarding HEP/ gait training/precautions 10'                                           Therapeutic Exercise and NMR EXR  [x] (72326) Provided verbal/tactile cueing for activities related to strengthening, flexibility, endurance, ROM for improvements in LE, proximal hip, and core control with self care, mobility, lifting, ambulation.  [] (35184) Provided verbal/tactile cueing for activities related to improving balance, coordination, kinesthetic sense, posture, motor skill, proprioception  to assist with LE, proximal hip, and core control in self care, mobility, lifting, ambulation and eccentric single leg control. NMR and Therapeutic Activities:    [x] (28635 or 37873) Provided verbal/tactile cueing for activities related to improving balance, coordination, kinesthetic sense, posture, motor skill, proprioception and motor activation to allow for proper function of core, proximal hip and LE with self care and ADLs  [] (54211) Gait Re-education- Provided training and instruction to the patient for proper LE, core and proximal hip recruitment and positioning and eccentric body weight control with ambulation re-education including up and down stairs     Home Exercise Program:    [x] (59012) Reviewed/Progressed HEP activities related to strengthening, flexibility, endurance, ROM of core, proximal hip and LE for functional self-care, mobility, lifting and ambulation/stair navigation   [] (15398)Reviewed/Progressed HEP activities related to improving balance, coordination, kinesthetic sense, posture, motor skill, proprioception of core, proximal hip and LE for self care, mobility, lifting, and ambulation/stair navigation      Manual Treatments:  PROM / STM / Oscillations-Mobs:  G-I, II, III, IV (PA's, Inf., Post.)  [x] (22477) Provided manual therapy to mobilize LE, proximal hip and/or LS spine soft tissue/joints for the purpose of modulating pain, promoting relaxation,  increasing ROM, reducing/eliminating soft tissue swelling/inflammation/restriction, improving soft tissue extensibility and allowing for proper ROM for normal function with self care, mobility, lifting and ambulation.      Modalities:  Deferred    Charges:  Timed Code Treatment Minutes: 30'   Total Treatment Minutes: 48'     [x] NIA (LOW) 24631 (typically 20 minutes face-to-face)  [] EVAL (MOD) 66073 (typically 30 minutes face-to-face)  [] EVAL (HIGH) 44757 (typically 45 minutes face-to-face)  [] RE-EVAL     [x] RM(67731) x 1     [] IONTO  [x] NMR (71888) x  1    [] VASO  [] Manual (65822) x       [] Other:  [] TA x       [] Mech Traction (17212)  [] ES(attended) (79231)      [] ES (un) (50955):     GOALS:   Patient stated goal: To be prepared for surgery      Therapist goals for Patient:    Short Term Goals: To be achieved in: 2 weeks  1. Independent in HEP and progression per patient tolerance, in order to prevent re-injury and to prepare for surgery by strength and flexibility therex . Progression Towards Functional goals:  [] Patient is progressing as expected towards functional goals listed. [] Progression is slowed due to complexities listed. [] Progression has been slowed due to co-morbidities. [] Plan just implemented, too soon to assess goals progression  [x] Other: 1-2 x visit if needed. ASSESSMENT:  See eval    Treatment/Activity Tolerance:  [x] Patient tolerated treatment well [] Patient limited by fatique  [] Patient limited by pain  [] Patient limited by other medical complications  [] Other:     Prognosis: [x] Good [] Fair  [] Poor        Recommend pt follow through with surgery date, recommend DC to home following sx. Patient Requires Follow-up: [] Yes  [x] No    PLAN: See eval. D/C PT if patient does not need 1 more visit to review HEP.       [] Continue per plan of care [] Alter current plan (see comments)  [] Plan of care initiated [x] Discharge    Electronically signed by: Alexia Bailey, PT,

## 2022-09-08 ENCOUNTER — OFFICE VISIT (OUTPATIENT)
Dept: ORTHOPEDIC SURGERY | Age: 72
End: 2022-09-08
Payer: MEDICARE

## 2022-09-08 ENCOUNTER — OFFICE VISIT (OUTPATIENT)
Dept: FAMILY MEDICINE CLINIC | Age: 72
End: 2022-09-08
Payer: MEDICARE

## 2022-09-08 VITALS
HEIGHT: 60 IN | SYSTOLIC BLOOD PRESSURE: 126 MMHG | HEART RATE: 84 BPM | DIASTOLIC BLOOD PRESSURE: 82 MMHG | OXYGEN SATURATION: 94 % | TEMPERATURE: 96.7 F | BODY MASS INDEX: 38.68 KG/M2 | WEIGHT: 197 LBS

## 2022-09-08 VITALS — BODY MASS INDEX: 38.87 KG/M2 | WEIGHT: 198 LBS | HEIGHT: 60 IN

## 2022-09-08 DIAGNOSIS — E78.5 HYPERLIPIDEMIA, UNSPECIFIED HYPERLIPIDEMIA TYPE: Primary | ICD-10-CM

## 2022-09-08 DIAGNOSIS — M16.11 PRIMARY OSTEOARTHRITIS OF RIGHT HIP: ICD-10-CM

## 2022-09-08 DIAGNOSIS — E66.3 OVERWEIGHT: ICD-10-CM

## 2022-09-08 DIAGNOSIS — M16.11 PRIMARY OSTEOARTHRITIS OF RIGHT HIP: Primary | ICD-10-CM

## 2022-09-08 PROCEDURE — G8427 DOCREV CUR MEDS BY ELIG CLIN: HCPCS | Performed by: FAMILY MEDICINE

## 2022-09-08 PROCEDURE — G8399 PT W/DXA RESULTS DOCUMENT: HCPCS | Performed by: FAMILY MEDICINE

## 2022-09-08 PROCEDURE — 1090F PRES/ABSN URINE INCON ASSESS: CPT | Performed by: FAMILY MEDICINE

## 2022-09-08 PROCEDURE — 99214 OFFICE O/P EST MOD 30 MIN: CPT | Performed by: ORTHOPAEDIC SURGERY

## 2022-09-08 PROCEDURE — 1090F PRES/ABSN URINE INCON ASSESS: CPT | Performed by: ORTHOPAEDIC SURGERY

## 2022-09-08 PROCEDURE — 3017F COLORECTAL CA SCREEN DOC REV: CPT | Performed by: ORTHOPAEDIC SURGERY

## 2022-09-08 PROCEDURE — 1123F ACP DISCUSS/DSCN MKR DOCD: CPT | Performed by: ORTHOPAEDIC SURGERY

## 2022-09-08 PROCEDURE — 3017F COLORECTAL CA SCREEN DOC REV: CPT | Performed by: FAMILY MEDICINE

## 2022-09-08 PROCEDURE — G8399 PT W/DXA RESULTS DOCUMENT: HCPCS | Performed by: ORTHOPAEDIC SURGERY

## 2022-09-08 PROCEDURE — G8427 DOCREV CUR MEDS BY ELIG CLIN: HCPCS | Performed by: ORTHOPAEDIC SURGERY

## 2022-09-08 PROCEDURE — G8417 CALC BMI ABV UP PARAM F/U: HCPCS | Performed by: ORTHOPAEDIC SURGERY

## 2022-09-08 PROCEDURE — 99214 OFFICE O/P EST MOD 30 MIN: CPT | Performed by: FAMILY MEDICINE

## 2022-09-08 PROCEDURE — 1036F TOBACCO NON-USER: CPT | Performed by: FAMILY MEDICINE

## 2022-09-08 PROCEDURE — 1036F TOBACCO NON-USER: CPT | Performed by: ORTHOPAEDIC SURGERY

## 2022-09-08 PROCEDURE — G8417 CALC BMI ABV UP PARAM F/U: HCPCS | Performed by: FAMILY MEDICINE

## 2022-09-08 PROCEDURE — 1123F ACP DISCUSS/DSCN MKR DOCD: CPT | Performed by: FAMILY MEDICINE

## 2022-09-08 ASSESSMENT — ENCOUNTER SYMPTOMS
SHORTNESS OF BREATH: 0
CHOKING: 0
CHEST TIGHTNESS: 0
WHEEZING: 0
BACK PAIN: 0
ABDOMINAL PAIN: 0
STRIDOR: 0
COUGH: 0

## 2022-09-08 NOTE — PROGRESS NOTES
Dr Thuy Wilde      Date /Time 9/8/2022       9:26 AM EDT  Name Rishabh Whitfield             1950   Location  [facility]  MRN [id number]                Chief Complaint   Patient presents with    Hip Pain     CK RIGHT HIP         History of Present Illness    Rishabh Whitfield is a 81 y.o. female who presents with  right hip pain. Occupation: Retired  Injury Mechanism:  none. Worker's Comp. & legal issues:   none. Previous Treatments: Ice, Heat and NSAIDs    Patient presents to the office today for a follow-up visit. I have seen her previously. I did perform an intra-articular cortisone injection on her which produced approximate 80% relief of not only her right hip but also her right knee pain. At her last visit she was consented for a right total hip arthroplasty by my physician assistant. She is here to discuss surgery and review lab work. Previous history: Patient presents to the office stating for a follow-up visit. Patient is here with a chief complaint of right knee pain. She did have a total knee arthroplasty done by Dr. Tony Smart and 2008. We did give her an intra-articular right hip injection at her last visit for diagnostic and therapeutic reasons. Patient had approximately 80% relief of her knee pain with the hip injection. We had recommended a total hip arthroplasty as her next course of action    Previous history: Patient presents to the office today with a chief complaint of right knee pain. Patient did have a right total knee arthroplasty done by Dr. Tony Smart in 2008. The knee has done well until recently. She is developing increasing knee pain and hip pain. The hip is only minimally symptomatic. Patient did see Dr. Tony Smart who suggested that the knee pain is referred from the hip. She is here for further discussion concerning the etiology of her pain. No injury or trauma. Patient denies any lumbar pain or radicular symptoms.     Past History  Past Medical History: Diagnosis Date    Arthritis     Hyperlipemia 5/7/2013    Primary osteoarthritis of right hip 5/3/2022     Past Surgical History:   Procedure Laterality Date    BREAST LUMPECTOMY      BREAST SURGERY      breast biopsy    COLONOSCOPY      COLONOSCOPY N/A 1/24/2019    COLON W/ANES. (10:00) performed by Isabel Bhakta DO at [address] on [address] buttocks had to be debrided and sewn shut    DILATION AND CURETTAGE OF UTERUS      ENDOSCOPY, COLON, DIAGNOSTIC      JOINT REPLACEMENT  2008    bilateral knees     Family History   Problem Relation Age of Onset    Cancer Mother         lung    Heart Attack Father     Cancer Sister         lung    Stroke Sister     Other Brother         liver failure     Social History     Tobacco Use    Smoking status: Never    Smokeless tobacco: Never   Substance Use Topics    Alcohol use: No      Current Outpatient Medications on File Prior to Visit   Medication Sig Dispense Refill    mupirocin (BACTROBAN) 2 % ointment Apply twice daily to each nare for the 5 days prior to surgical procedure 1 g 0    pravastatin (PRAVACHOL) 80 MG tablet Take 1 every evening for high cholesterol 90 tablet 1     No current facility-administered medications on file prior to visit. ASCVD 10-YEAR RISK SCORE  The 10-year ASCVD risk score (Vincent Augustine, et al., 2013) is: 12.8%    Values used to calculate the score:      Age: 81 years      Sex: Female      Is Non- : No      Diabetic: No      Tobacco smoker: No      Systolic Blood Pressure: 639 mmHg      Is BP treated: No      HDL Cholesterol: 63 mg/dL      Total Cholesterol: 235 mg/dL   . Review of Systems  10-point ROS is negative other than HPI. Physical Exam  Based off 1997 Exam Criteria  Ht 5' (1.524 m)   Wt 198 lb (89.8 kg)   BMI 38.67 kg/m²      Constitutional:       General: He is not in acute distress. Appearance: Normal appearance.    Cardiovascular:      Rate and Rhythm: Normal rate and regular rhythm. Pulses: Normal pulses. Pulmonary:      Effort: Pulmonary effort is normal. No respiratory distress. Neurological:      Mental Status: He is alert and oriented to person, place, and time. Mental status is at baseline.      Musculoskeletal:  Gait:  antalgic    Spine / Hip Exam:      RIGHT  LEFT    Lumbar Spine Exam  [x] All Neg    [x] All Neg     Straight leg raise  []  []Not tested   []  []Not tested    Clonus  []  []Not tested   []  []Not tested    Pain with motion  []  []Not tested   []  []Not tested    Radiculopathy  []  []Not tested   []  []Not tested    Paraspinal muscle tenderness  [] Paraspinal  []Midline   [] Paraspinal  []Midline   Sensation RIGHT  LEFT    L3  [x] Normal []Decreased    [x] Normal []Decreased   L4  [x] Normal  []Decreased   [x] Normal []Decreased   L5  [x] Normal []Decreased   [x] Normal []Decreased   S1  [x] Normal  []Decreased   [x] Normal []Decreased   Pelvis       Scoliosis  [x] Nml  [] Present     Leg-length discrepency  [x] Equal  [] Right longer   [] Left longer   Range of Motion Active Passive Active Passive   Hip Flexion 100  110    Abduction 30  40    External Rotation @ 90 flex 45  45    Internal Rotation @ 90 flex 10  20           Hip Impingement / Dysplasia  [] All Neg  [] Not tested   [x] All Neg  [] Not tested    Hip impingement test  [x]  []Not tested   []  []Not tested    C-sign  [x]  []Not tested   []  []Not tested    Anterior instability apprehension  []  []Not tested   []  []Not tested    Posterior instability apprehension  []  []Not tested   []  []Not tested    Uncontained Internal rotation  []  []Not tested  []  []Not tested          Abductors  [x] All Neg  [] Not tested   [x] All Neg  [] Not tested    Medius strength  []  []Not tested   []  []Not tested    Minimum strength  []  []Not tested   []  []Not tested    IT band tendonitis  []  []Not tested   []  []Not tested    Trochanteric tenderness  []  []Not tested  []  []Not tested   Sciatic neuropathic pain  []  []Not tested   []  []Not tested           Post-arthroplasty  [] All Neg  [] Not tested   [] All Neg  [] Not tested    Rectus tendonitis  []  []Not tested   []  []Not tested    Iliopsoas tendonitis       Start-up pain  []  []Not tested   []  []Not tested      Physical exam of the right knee demonstrates an incision consistent with total knee arthroplasty. No significant swelling. Range of motion 0-110. Mild instability to varus and valgus stress increased in mid flexion versus extension. Imaging    Right Hip: [facility] [place]  Previous Radiographs: X-rays were ordered today and reviewed of the right hip.  3 views. AP pelvis, lateral, and false profile. They demonstrate end-stage osteoarthritis right hip without evidence of fractures or dislocations. She has complete effacement of joint space with large osteophytes present. No evidence of acetabular dysplasia also present. Sclerosis and subchondral cyst with bone-on-bone arthritic changes. Significant lumbar DDD also seen. Previous x-rays of the right knee were also reviewed today. No gross evidence of loosening or eccentric wear. I reviewed previous testing, lab work, and previous visits with Dr. Pankaj Marino. I also reviewed her previous injection records prior to the visit. Procedure:  No orders of the defined types were placed in this encounter. Assessment and Plan  Jaqueline Arroyo was seen today for hip pain. Diagnoses and all orders for this visit:    Primary osteoarthritis of right hip      Assessment of her preoperative lab work is within acceptable limits. Her BMI is 38.67 which does place her at increased risk of complications postoperatively including but not limited to wound dehiscence and infection. Patient did have a selective local anesthetic with a cortisone injection done under ultrasound into her right hip today. This was done for both diagnostic and therapeutic purposes.   She states her pain is at least 80% better before she left the office. The reduction in pain symptoms were both knee and hip. Patient will proceed with a right anterior total hip arthroplasty at [facility]. She does understand she is at increased risk of postoperative complications due to cardiac issues and previous abnormal EKG. In addition she is aware there may not be 100% reduction in her pain symptoms involving her knee. She wishes to proceed. I discussed with Nilsa Steven that her history, symptoms, signs, and imaging are most consistent with hip arthritis    We reviewed the natural history of these conditions and treatment options ranging from conservative measures (rest, icing, activity modification, physical therapy, pain meds, cortisone injection)  to surgical options. We had a long discussion with the patient about their hip. We discussed surgical and non surgical options for hip arthritis. The most important thing is to work to maintain their range of motion. Next they can try medications including tylenol and NSAIDs. They can try glucosamine or chondroitin. They should also ice frequently and avoid activities that make their hip hurt. Cortisone injections and Synvisc injections are also options when medicine has failed. We finally discussed surgical options including arthroscopic debridement versus hip replacement. Often the arthritis is too far gone for an arthroscopic debridement and pain relief will be short term. Their ultimate solution will be a hip replacement when they are ready for it. They should put it off until they can no longer stand the pain and when nothing else has worked. Conservative measures have failed. She is not interested in cortisone injections. I think she is an appropriate candidate for surgery due to her ongoing symptoms and dysfunction despite conservative measures.      The procedure would be Right anterior   [address] Total Hip Arthroplasty    Perioperative considerations include: Pre-operative clearance from medical subspecialty. We reviewed the risks, benefits, alternatives of this approach. We discussed risks including, but not limited to, bleeding, pain, infection, scarring, damage to the neurovascular structures, blood clots, pulmonary embolus, stiffness, implant instability or loosening, implant failure, incomplete relief of pain, and incomplete return of function. We also reviewed the surgical details, expected recovery, and rehabilitation (6-9 months). She expressed understanding and will undergo preoperative medical evaluation and optimization. Electronically signed by Ted Barnes MD on 9/8/2022 at 10:39 AM  This dictation was generated by voice recognition computer software. Although all attempts are made to edit the dictation for accuracy, there may be errors in the transcription that are not intended.

## 2022-09-08 NOTE — PROGRESS NOTES
Subjective:      Patient ID: Elan Kitchen is a 81 y.o. female. MARTIN Castrejon is here for follow-up on hyperlipidemia whose control is not the best I asked her to increase her Pravachol to 80 mg daily and we will recheck a lipid panel in 2 months. She remains overweight. She has severe hip degenerative joint disease on the right with radiation of pain into the knee. She plans a total hip replacement in October of this year. Review of Systems   Constitutional:  Negative for activity change, appetite change, chills, diaphoresis, fatigue, fever and unexpected weight change. Respiratory:  Negative for cough, choking, chest tightness, shortness of breath, wheezing and stridor. Cardiovascular:  Negative for chest pain, palpitations and leg swelling. Gastrointestinal:  Negative for abdominal pain. Genitourinary:  Negative for difficulty urinating. Musculoskeletal:  Positive for arthralgias (R hip). Negative for back pain. Skin:  Negative for rash. Neurological:  Negative for dizziness. Objective:   Physical Exam  Vitals and nursing note reviewed. Constitutional:       Appearance: She is well-developed. HENT:      Head: Normocephalic and atraumatic. Right Ear: External ear normal.      Left Ear: External ear normal.      Nose: Nose normal.   Eyes:      Conjunctiva/sclera: Conjunctivae normal.      Pupils: Pupils are equal, round, and reactive to light. Neck:      Thyroid: No thyromegaly. Vascular: No JVD. Trachea: No tracheal deviation. Cardiovascular:      Rate and Rhythm: Normal rate and regular rhythm. Heart sounds: Normal heart sounds. No murmur heard. No friction rub. No gallop. Pulmonary:      Effort: Pulmonary effort is normal. No respiratory distress. Breath sounds: Normal breath sounds. No stridor. No wheezing or rales. Chest:      Chest wall: No tenderness. Abdominal:      General: Bowel sounds are normal. There is no distension.       Palpations:

## 2022-09-09 ENCOUNTER — TELEPHONE (OUTPATIENT)
Dept: ORTHOPEDIC SURGERY | Age: 72
End: 2022-09-09

## 2022-09-09 NOTE — TELEPHONE ENCOUNTER
Jatin Rogers [id number]    Date: 10/03/22  Type of SX:  INPATIENT CHANGED TO OUTPATIENT FOR AUTO APPROVAL  Location: [facility]  CPT: 13819   DX Code: M16.11  SX area: R HIP  Insurance: [address] [place] [address] VALID 10/03/22 -10/01/2023

## 2022-09-22 ENCOUNTER — TELEPHONE (OUTPATIENT)
Dept: ORTHOPEDIC SURGERY | Age: 72
End: 2022-09-22

## 2022-09-22 NOTE — TELEPHONE ENCOUNTER
ORTHOPAEDIC NURSE NAVIGATOR SUMMARY NOTE      Anticipated Date of Surgery: 10/3/22    Recieved Pre-Op Education: yes   In person class:No  Pt used educational link:Yes   Pt completed pre and post test to measure learning:yes    If pt did not complete either, why not? N/A    ERAS protocol explained to pt. Pt does not have the following medical conditions:  -Diabetes  -Gastroparesis  -CHF  -Fluid restricted diet  -Known difficult airway  Pt instructed to drink up to 40 oz of Gatorade type drink the evening prior to surgery. Pt informed they can have up to 40 oz of water and that it must be completed 2 hours prior to scheduled surgery. Pt verbalized understanding. PCP: Keaton Sexton DO   Phone #: [phone]    Date of PCP Visit for H&P: 9/29/22    Any Noted Concerns from PCP prior to surgery:  No   If Yes, what concerns?:    IS THE PATIENT IN A PAIN MANAGEMENT PROGRAM?:   No     Review of Past Medical History Reveals History of:      Critical Lab Values:   Hgb/Hct:   Hemoglobin (g/dL)   Date Value   08/01/2022 15.5   /  Hematocrit (%)   Date Value   08/01/2022 46.1      HgbA1C:    Lab Results   Component Value Date    LABA1C 5.7 08/01/2022      Albumin:    Lab Results   Component Value Date    LABALBU 4.6 08/01/2022      BUN/Cr:   BUN (mg/dL)   Date Value   08/01/2022 14   /  Creatinine (mg/dL)   Date Value   08/01/2022 0.7      BMI:    BMI Readings from Last 1 Encounters:   09/08/22 38.47 kg/m²        Coronary Artery Disease/HTN/CHF History: No      Cardiologist:     Cardiac Clearance Necessary: No    Date of Cardiac Clearance Appt: On Plavix? No,  If YES, when will they stop taking?     Final Cardiac Recommendations:N/A   -On any anticoagulation-none       Diabetes History: No    Most Recent HgbA1C: N/A    PCP or Endocrine Recommendations: N/A    Nutritionist/Dietician Consult Scheduled: N/A    Final Plan For Diabetic Control: N/A   Pulmonary: COPD/Emphysema/ Use of home oxygen: none     Alcohol use: DVT Risk Stratification:  Low      Vascular Consult Ordered:  NA    Date of Vascular Appt:     Hematology/Oncology Consult Ordered:  NA    Date of Hematology/Oncology Appt:     Final Recommendation For DVT Prophylaxis:   -Smoking history or use of estrogen-         BMI Greater than 40 at time of scheduling?: No    Has Surgeon been notified of BMI concern? Not Applicable    Weight Loss Clinic Consult Ordered: Not Applicable    Date of Wt Loss Clinic Appt:     BMI at time of surgery (if went through [facility]): Additional Medical Concerns:         Discharge Disposition Information:     Attended Pre-Hab Program: yes     Anticipated Discharge Disposition:  Home with no PT    Who will be with patient at home following discharge?         Equipment pt already has:  Bryn Rhein and cane   Bedroom on first or second floor: First   Bathroom on first or second floor: first   Weight bearing status: full   Pre-op ambulatory status: none   Number of entry steps: 2 steps   Caregiver assistance: full time    Pt plan to DC same day: Yes   [facility] preference: VINICIO Urias RN  9/22/2022

## 2022-09-28 NOTE — PROGRESS NOTES
1. Do not eat or drink anything after 12 midnight prior to surgery. This includes no water, chewing gum mints, or ice chips. You may brush your teeth and gargle the day of surgery but DO NOT SWALLOW THE WATER. 2. Please see your family doctor/pediatrician for a history and physical and/or concerning medications. Bring any test results/reports from your physician's office. If you are under the care of a heart doctor or specialist please be aware that you may be asked to see him or her for clearance. 3. You may be asked to stop blood thinners such as Coumadin, Plavix, Fragmin, and Lovenox or Anti-inflammatories such as Aspirin, Ibuprofen, Advil, and Naproxen prior to your surgery. Please check with your doctor before stopping these or any other medications. 4. Do not smoke, and do not drink any alcoholic beverages 24 hours prior to surgery. 5. You MUST make arrangements for a responsible adult to take you home after your surgery. For your safety, you will not be allowed to leave alone or drive yourself home. Your surgery will be cancelled if you do not have a ride home. Also for your safety, it is strongly suggested someone stay with you the first 24 hrs after your surgery. 6. A parent/legal guardian must accompany a child scheduled for surgery and plan to stay at the hospital until the child is discharged. Please do not bring other children with you. 7. For your comfort,please wear simple, loose fitting clothing to the hospital.  Please do not bring valuables (money, credit cards, checkbooks, etc.) Do not wear any makeup (including no eye makeup) or nail polish on your fingers or toes. 8. For your safety, please DO NOT wear any jewelry or piercings on day of surgery. All body piercing jewelry must be removed. 9. If you have dentures, they will be removed before going to the OR; for your convenience we will provide you with a container.   If you wear contact lenses or glasses, they will be removed, they will be removed, please bring a case for them. 10. If appicable,Please see your family doctor/pediatrician for a history & physical and/or concerning medications. Bring any test results/reports from your physician's office. 11. Remember to bring Blood Bank bracelet to the hospital on the day of surgery. 12. If you have a Living Will and Durable Power of  for Healthcare, please bring in a copy. 15. Notify your Surgeon if you develop any illness between now and surgery  time, cough, cold, fever, sore throat, nausea, vomiting, etc.  Please notify your surgeon if you experience dizziness, shortness of breath or blurred vision between now & the time of your surgery   14. DO NOT shave your operative site 96 hours prior to surgery. For face & neck surgery, men may use an electric razor 48 hours prior to surgery. 15. Shower the night before surgery with _x__Antibacterial soap __x_Hibiclens   16. To provide excellent care visitors will be limited to one in the room at any given time. 17.  Please bring picture ID and insurance card. 18.  Visit our web site for additional information:  [employer]/surgery.            -op

## 2022-09-29 ENCOUNTER — OFFICE VISIT (OUTPATIENT)
Dept: FAMILY MEDICINE CLINIC | Age: 72
End: 2022-09-29
Payer: MEDICARE

## 2022-09-29 VITALS
HEART RATE: 104 BPM | TEMPERATURE: 97.5 F | HEIGHT: 63 IN | DIASTOLIC BLOOD PRESSURE: 83 MMHG | WEIGHT: 198 LBS | BODY MASS INDEX: 35.08 KG/M2 | SYSTOLIC BLOOD PRESSURE: 129 MMHG | OXYGEN SATURATION: 97 %

## 2022-09-29 DIAGNOSIS — M16.11 PRIMARY OSTEOARTHRITIS OF RIGHT HIP: ICD-10-CM

## 2022-09-29 DIAGNOSIS — Z01.818 PREOP EXAMINATION: Primary | ICD-10-CM

## 2022-09-29 PROCEDURE — 1090F PRES/ABSN URINE INCON ASSESS: CPT | Performed by: FAMILY MEDICINE

## 2022-09-29 PROCEDURE — 99214 OFFICE O/P EST MOD 30 MIN: CPT | Performed by: FAMILY MEDICINE

## 2022-09-29 PROCEDURE — G8417 CALC BMI ABV UP PARAM F/U: HCPCS | Performed by: FAMILY MEDICINE

## 2022-09-29 PROCEDURE — 1036F TOBACCO NON-USER: CPT | Performed by: FAMILY MEDICINE

## 2022-09-29 PROCEDURE — G8427 DOCREV CUR MEDS BY ELIG CLIN: HCPCS | Performed by: FAMILY MEDICINE

## 2022-09-29 PROCEDURE — 1123F ACP DISCUSS/DSCN MKR DOCD: CPT | Performed by: FAMILY MEDICINE

## 2022-09-29 PROCEDURE — G8399 PT W/DXA RESULTS DOCUMENT: HCPCS | Performed by: FAMILY MEDICINE

## 2022-09-29 PROCEDURE — 3017F COLORECTAL CA SCREEN DOC REV: CPT | Performed by: FAMILY MEDICINE

## 2022-09-29 ASSESSMENT — ENCOUNTER SYMPTOMS
STRIDOR: 0
WHEEZING: 0
BACK PAIN: 0
COUGH: 0
CHEST TIGHTNESS: 0
SHORTNESS OF BREATH: 0
ABDOMINAL PAIN: 0
CHOKING: 0

## 2022-09-29 NOTE — PROGRESS NOTES
Subjective:      Patient ID: Ashley Eastman is a 81 y.o. female. MARTIN Marin is here for preop exam prior to total hip replacement surgery. She feels well and has no new complaints or problems. She has an EKG abnormality that is been present since her teen years. She has gone through cardiac work-ups all of which were reassuringly within normal limits. Review of Systems   Constitutional:  Negative for activity change, appetite change, chills, diaphoresis, fatigue, fever and unexpected weight change. Respiratory:  Negative for cough, choking, chest tightness, shortness of breath, wheezing and stridor. Cardiovascular:  Negative for chest pain, palpitations and leg swelling. Gastrointestinal:  Negative for abdominal pain. Genitourinary:  Negative for difficulty urinating. Musculoskeletal:  Positive for arthralgias. Negative for back pain. Skin:  Negative for rash. Neurological:  Negative for dizziness. Objective:   Physical Exam  Vitals and nursing note reviewed. Constitutional:       Appearance: She is well-developed. HENT:      Head: Normocephalic and atraumatic. Right Ear: External ear normal.      Left Ear: External ear normal.      Nose: Nose normal.   Eyes:      Conjunctiva/sclera: Conjunctivae normal.      Pupils: Pupils are equal, round, and reactive to light. Neck:      Thyroid: No thyromegaly. Vascular: No JVD. Trachea: No tracheal deviation. Cardiovascular:      Rate and Rhythm: Normal rate and regular rhythm. Heart sounds: Normal heart sounds. No murmur heard. No friction rub. No gallop. Pulmonary:      Effort: Pulmonary effort is normal. No respiratory distress. Breath sounds: Normal breath sounds. No stridor. No wheezing or rales. Chest:      Chest wall: No tenderness. Abdominal:      General: Bowel sounds are normal. There is no distension. Palpations: Abdomen is soft. There is no mass. Tenderness:  There is no abdominal tenderness. There is no guarding or rebound. Musculoskeletal:         General: No tenderness. Normal range of motion. Cervical back: Normal range of motion and neck supple. Lymphadenopathy:      Cervical: No cervical adenopathy. Skin:     General: Skin is warm and dry. Coloration: Skin is not pale. Findings: No erythema or rash. Neurological:      Mental Status: She is alert and oriented to person, place, and time. Cranial Nerves: No cranial nerve deficit. Motor: No abnormal muscle tone. Coordination: Coordination normal.      Deep Tendon Reflexes: Reflexes are normal and symmetric. Reflexes normal.       Assessment / Plan:       1. Preop examination-diet is cleared for the procedure.       2. Primary osteoarthritis of right hip

## 2022-09-30 ENCOUNTER — TELEPHONE (OUTPATIENT)
Dept: ORTHOPEDIC SURGERY | Age: 72
End: 2022-09-30

## 2022-09-30 ENCOUNTER — ANESTHESIA EVENT (OUTPATIENT)
Dept: OPERATING ROOM | Age: 72
End: 2022-09-30
Payer: MEDICARE

## 2022-10-03 ENCOUNTER — ANESTHESIA (OUTPATIENT)
Dept: OPERATING ROOM | Age: 72
End: 2022-10-03
Payer: MEDICARE

## 2022-10-03 ENCOUNTER — HOSPITAL ENCOUNTER (OUTPATIENT)
Age: 72
Discharge: HOME | End: 2022-10-03
Attending: ORTHOPAEDIC SURGERY | Admitting: ORTHOPAEDIC SURGERY
Payer: MEDICARE

## 2022-10-03 ENCOUNTER — APPOINTMENT (OUTPATIENT)
Dept: GENERAL RADIOLOGY | Age: 72
End: 2022-10-03
Attending: ORTHOPAEDIC SURGERY
Payer: MEDICARE

## 2022-10-03 VITALS
SYSTOLIC BLOOD PRESSURE: 131 MMHG | TEMPERATURE: 97.2 F | HEART RATE: 74 BPM | OXYGEN SATURATION: 91 % | HEIGHT: 63 IN | RESPIRATION RATE: 16 BRPM | WEIGHT: 190 LBS | BODY MASS INDEX: 33.66 KG/M2 | DIASTOLIC BLOOD PRESSURE: 81 MMHG

## 2022-10-03 DIAGNOSIS — M16.11 PRIMARY LOCALIZED OSTEOARTHRITIS OF RIGHT HIP: Primary | ICD-10-CM

## 2022-10-03 DIAGNOSIS — M16.11 PRIMARY OSTEOARTHRITIS OF RIGHT HIP: ICD-10-CM

## 2022-10-03 LAB
ABO/RH: NORMAL
ANTIBODY SCREEN: NORMAL

## 2022-10-03 PROCEDURE — 2500000003 HC RX 250 WO HCPCS: Performed by: ANESTHESIOLOGY

## 2022-10-03 PROCEDURE — 7100000000 HC PACU RECOVERY - FIRST 15 MIN: Performed by: ORTHOPAEDIC SURGERY

## 2022-10-03 PROCEDURE — 86901 BLOOD TYPING SEROLOGIC RH(D): CPT

## 2022-10-03 PROCEDURE — 73502 X-RAY EXAM HIP UNI 2-3 VIEWS: CPT

## 2022-10-03 PROCEDURE — 97535 SELF CARE MNGMENT TRAINING: CPT

## 2022-10-03 PROCEDURE — 73501 X-RAY EXAM HIP UNI 1 VIEW: CPT

## 2022-10-03 PROCEDURE — 86900 BLOOD TYPING SEROLOGIC ABO: CPT

## 2022-10-03 PROCEDURE — 6360000002 HC RX W HCPCS: Performed by: ORTHOPAEDIC SURGERY

## 2022-10-03 PROCEDURE — 2580000003 HC RX 258: Performed by: ORTHOPAEDIC SURGERY

## 2022-10-03 PROCEDURE — C1776 JOINT DEVICE (IMPLANTABLE): HCPCS | Performed by: ORTHOPAEDIC SURGERY

## 2022-10-03 PROCEDURE — 3700000000 HC ANESTHESIA ATTENDED CARE: Performed by: ORTHOPAEDIC SURGERY

## 2022-10-03 PROCEDURE — 2709999900 HC NON-CHARGEABLE SUPPLY: Performed by: ORTHOPAEDIC SURGERY

## 2022-10-03 PROCEDURE — 6370000000 HC RX 637 (ALT 250 FOR IP): Performed by: ANESTHESIOLOGY

## 2022-10-03 PROCEDURE — 97116 GAIT TRAINING THERAPY: CPT

## 2022-10-03 PROCEDURE — 2720000010 HC SURG SUPPLY STERILE: Performed by: ORTHOPAEDIC SURGERY

## 2022-10-03 PROCEDURE — 3700000001 HC ADD 15 MINUTES (ANESTHESIA): Performed by: ORTHOPAEDIC SURGERY

## 2022-10-03 PROCEDURE — 3600000004 HC SURGERY LEVEL 4 BASE: Performed by: ORTHOPAEDIC SURGERY

## 2022-10-03 PROCEDURE — 2580000003 HC RX 258: Performed by: ANESTHESIOLOGY

## 2022-10-03 PROCEDURE — 7100000011 HC PHASE II RECOVERY - ADDTL 15 MIN: Performed by: ORTHOPAEDIC SURGERY

## 2022-10-03 PROCEDURE — 6360000002 HC RX W HCPCS

## 2022-10-03 PROCEDURE — 6360000002 HC RX W HCPCS: Performed by: ANESTHESIOLOGY

## 2022-10-03 PROCEDURE — 3209999900 FLUORO FOR SURGICAL PROCEDURES

## 2022-10-03 PROCEDURE — 3600000014 HC SURGERY LEVEL 4 ADDTL 15MIN: Performed by: ORTHOPAEDIC SURGERY

## 2022-10-03 PROCEDURE — 97165 OT EVAL LOW COMPLEX 30 MIN: CPT

## 2022-10-03 PROCEDURE — 97161 PT EVAL LOW COMPLEX 20 MIN: CPT

## 2022-10-03 PROCEDURE — 97530 THERAPEUTIC ACTIVITIES: CPT

## 2022-10-03 PROCEDURE — 7100000010 HC PHASE II RECOVERY - FIRST 15 MIN: Performed by: ORTHOPAEDIC SURGERY

## 2022-10-03 PROCEDURE — 7100000001 HC PACU RECOVERY - ADDTL 15 MIN: Performed by: ORTHOPAEDIC SURGERY

## 2022-10-03 PROCEDURE — 27130 TOTAL HIP ARTHROPLASTY: CPT | Performed by: ORTHOPAEDIC SURGERY

## 2022-10-03 PROCEDURE — 86850 RBC ANTIBODY SCREEN: CPT

## 2022-10-03 DEVICE — IMPLANTABLE DEVICE
Type: IMPLANTABLE DEVICE | Site: HIP | Status: FUNCTIONAL
Brand: G7® VIVACIT-E®

## 2022-10-03 DEVICE — HEAD FEM DIA36MM NK L+0MM 12/14 TAPR ACET HIP BIOLOX DELT: Type: IMPLANTABLE DEVICE | Site: HIP | Status: FUNCTIONAL

## 2022-10-03 DEVICE — IMPLANTABLE DEVICE
Type: IMPLANTABLE DEVICE | Site: HIP | Status: FUNCTIONAL
Brand: G7® ACETABULAR SYSTEM

## 2022-10-03 DEVICE — AVENIR COMPLETE STANDARD COLLARLESS SIZE 1: Type: IMPLANTABLE DEVICE | Site: HIP | Status: FUNCTIONAL

## 2022-10-03 RX ORDER — OXYCODONE HYDROCHLORIDE 5 MG/1
10 TABLET ORAL EVERY 4 HOURS PRN
Status: CANCELLED | OUTPATIENT
Start: 2022-10-03

## 2022-10-03 RX ORDER — SODIUM CHLORIDE 0.9 % (FLUSH) 0.9 %
5-40 SYRINGE (ML) INJECTION PRN
Status: DISCONTINUED | OUTPATIENT
Start: 2022-10-03 | End: 2022-10-03 | Stop reason: HOSPADM

## 2022-10-03 RX ORDER — ACETAMINOPHEN 500 MG
1000 TABLET ORAL ONCE
Status: COMPLETED | OUTPATIENT
Start: 2022-10-03 | End: 2022-10-03

## 2022-10-03 RX ORDER — SODIUM CHLORIDE 0.9 % (FLUSH) 0.9 %
5-40 SYRINGE (ML) INJECTION EVERY 12 HOURS SCHEDULED
Status: CANCELLED | OUTPATIENT
Start: 2022-10-03

## 2022-10-03 RX ORDER — SODIUM CHLORIDE 0.9 % (FLUSH) 0.9 %
5-40 SYRINGE (ML) INJECTION EVERY 12 HOURS SCHEDULED
Status: DISCONTINUED | OUTPATIENT
Start: 2022-10-03 | End: 2022-10-03 | Stop reason: HOSPADM

## 2022-10-03 RX ORDER — SENNOSIDES 8.8 MG/5ML
5 LIQUID ORAL 2 TIMES DAILY PRN
Status: CANCELLED | OUTPATIENT
Start: 2022-10-03

## 2022-10-03 RX ORDER — ASPIRIN 81 MG/1
81 TABLET ORAL 2 TIMES DAILY
Qty: 60 TABLET | Refills: 0 | Status: SHIPPED | OUTPATIENT
Start: 2022-10-04

## 2022-10-03 RX ORDER — SODIUM CHLORIDE, SODIUM LACTATE, POTASSIUM CHLORIDE, CALCIUM CHLORIDE 600; 310; 30; 20 MG/100ML; MG/100ML; MG/100ML; MG/100ML
INJECTION, SOLUTION INTRAVENOUS CONTINUOUS
Status: CANCELLED | OUTPATIENT
Start: 2022-10-03

## 2022-10-03 RX ORDER — ONDANSETRON 2 MG/ML
INJECTION INTRAMUSCULAR; INTRAVENOUS
Status: COMPLETED
Start: 2022-10-03 | End: 2022-10-03

## 2022-10-03 RX ORDER — MIDAZOLAM HYDROCHLORIDE 1 MG/ML
1 INJECTION INTRAMUSCULAR; INTRAVENOUS ONCE
Status: CANCELLED | OUTPATIENT
Start: 2022-10-03 | End: 2022-10-03

## 2022-10-03 RX ORDER — MEPERIDINE HYDROCHLORIDE 50 MG/ML
12.5 INJECTION INTRAMUSCULAR; INTRAVENOUS; SUBCUTANEOUS EVERY 5 MIN PRN
Status: DISCONTINUED | OUTPATIENT
Start: 2022-10-03 | End: 2022-10-03 | Stop reason: HOSPADM

## 2022-10-03 RX ORDER — FENTANYL CITRATE 50 UG/ML
INJECTION, SOLUTION INTRAMUSCULAR; INTRAVENOUS PRN
Status: DISCONTINUED | OUTPATIENT
Start: 2022-10-03 | End: 2022-10-03 | Stop reason: SDUPTHER

## 2022-10-03 RX ORDER — ONDANSETRON 2 MG/ML
4 INJECTION INTRAMUSCULAR; INTRAVENOUS
Status: COMPLETED | OUTPATIENT
Start: 2022-10-03 | End: 2022-10-03

## 2022-10-03 RX ORDER — OXYCODONE HYDROCHLORIDE 5 MG/1
5 TABLET ORAL
Status: DISCONTINUED | OUTPATIENT
Start: 2022-10-03 | End: 2022-10-03 | Stop reason: HOSPADM

## 2022-10-03 RX ORDER — SODIUM CHLORIDE 9 MG/ML
INJECTION, SOLUTION INTRAVENOUS PRN
Status: CANCELLED | OUTPATIENT
Start: 2022-10-03

## 2022-10-03 RX ORDER — SODIUM CHLORIDE 9 MG/ML
INJECTION, SOLUTION INTRAVENOUS PRN
Status: DISCONTINUED | OUTPATIENT
Start: 2022-10-03 | End: 2022-10-03 | Stop reason: HOSPADM

## 2022-10-03 RX ORDER — CEPHALEXIN 500 MG/1
500 CAPSULE ORAL 4 TIMES DAILY
Qty: 4 CAPSULE | Refills: 0 | Status: SHIPPED | OUTPATIENT
Start: 2022-10-03

## 2022-10-03 RX ORDER — CYCLOBENZAPRINE HCL 10 MG
10 TABLET ORAL 3 TIMES DAILY PRN
Qty: 30 TABLET | Refills: 0 | Status: SHIPPED | OUTPATIENT
Start: 2022-10-03 | End: 2022-10-13

## 2022-10-03 RX ORDER — ONDANSETRON 2 MG/ML
4 INJECTION INTRAMUSCULAR; INTRAVENOUS ONCE
Status: CANCELLED | OUTPATIENT
Start: 2022-10-03

## 2022-10-03 RX ORDER — DIPHENHYDRAMINE HYDROCHLORIDE 50 MG/ML
12.5 INJECTION INTRAMUSCULAR; INTRAVENOUS
Status: DISCONTINUED | OUTPATIENT
Start: 2022-10-03 | End: 2022-10-03 | Stop reason: HOSPADM

## 2022-10-03 RX ORDER — SODIUM CHLORIDE, SODIUM LACTATE, POTASSIUM CHLORIDE, CALCIUM CHLORIDE 600; 310; 30; 20 MG/100ML; MG/100ML; MG/100ML; MG/100ML
INJECTION, SOLUTION INTRAVENOUS CONTINUOUS PRN
Status: DISCONTINUED | OUTPATIENT
Start: 2022-10-03 | End: 2022-10-03 | Stop reason: SDUPTHER

## 2022-10-03 RX ORDER — SODIUM CHLORIDE, SODIUM LACTATE, POTASSIUM CHLORIDE, CALCIUM CHLORIDE 600; 310; 30; 20 MG/100ML; MG/100ML; MG/100ML; MG/100ML
INJECTION, SOLUTION INTRAVENOUS CONTINUOUS
Status: DISCONTINUED | OUTPATIENT
Start: 2022-10-03 | End: 2022-10-03 | Stop reason: HOSPADM

## 2022-10-03 RX ORDER — CELECOXIB 100 MG/1
200 CAPSULE ORAL ONCE
Status: COMPLETED | OUTPATIENT
Start: 2022-10-03 | End: 2022-10-03

## 2022-10-03 RX ORDER — SODIUM CHLORIDE 0.9 % (FLUSH) 0.9 %
5-40 SYRINGE (ML) INJECTION PRN
Status: CANCELLED | OUTPATIENT
Start: 2022-10-03

## 2022-10-03 RX ORDER — MELOXICAM 7.5 MG/1
3.75 TABLET ORAL DAILY
Status: CANCELLED | OUTPATIENT
Start: 2022-10-03 | End: 2022-10-06

## 2022-10-03 RX ORDER — ROCURONIUM BROMIDE 10 MG/ML
INJECTION, SOLUTION INTRAVENOUS PRN
Status: DISCONTINUED | OUTPATIENT
Start: 2022-10-03 | End: 2022-10-03 | Stop reason: SDUPTHER

## 2022-10-03 RX ORDER — HYDRALAZINE HYDROCHLORIDE 20 MG/ML
10 INJECTION INTRAMUSCULAR; INTRAVENOUS
Status: DISCONTINUED | OUTPATIENT
Start: 2022-10-03 | End: 2022-10-03 | Stop reason: HOSPADM

## 2022-10-03 RX ORDER — POLYETHYLENE GLYCOL 3350 17 G/17G
17 POWDER, FOR SOLUTION ORAL DAILY
Status: CANCELLED | OUTPATIENT
Start: 2022-10-03

## 2022-10-03 RX ORDER — OXYCODONE HYDROCHLORIDE 5 MG/1
5 TABLET ORAL EVERY 4 HOURS PRN
Status: CANCELLED | OUTPATIENT
Start: 2022-10-03

## 2022-10-03 RX ORDER — ONDANSETRON 4 MG/1
4 TABLET, FILM COATED ORAL 3 TIMES DAILY PRN
Qty: 15 TABLET | Refills: 0 | Status: SHIPPED | OUTPATIENT
Start: 2022-10-03

## 2022-10-03 RX ORDER — IPRATROPIUM BROMIDE AND ALBUTEROL SULFATE 2.5; .5 MG/3ML; MG/3ML
1 SOLUTION RESPIRATORY (INHALATION)
Status: DISCONTINUED | OUTPATIENT
Start: 2022-10-03 | End: 2022-10-03 | Stop reason: HOSPADM

## 2022-10-03 RX ORDER — VANCOMYCIN HYDROCHLORIDE 1 G/20ML
INJECTION, POWDER, LYOPHILIZED, FOR SOLUTION INTRAVENOUS PRN
Status: DISCONTINUED | OUTPATIENT
Start: 2022-10-03 | End: 2022-10-03 | Stop reason: HOSPADM

## 2022-10-03 RX ORDER — OXYCODONE HYDROCHLORIDE 5 MG/1
5 TABLET ORAL EVERY 6 HOURS PRN
Qty: 28 TABLET | Refills: 0 | Status: SHIPPED | OUTPATIENT
Start: 2022-10-03 | End: 2022-10-10

## 2022-10-03 RX ORDER — PROMETHAZINE HYDROCHLORIDE 25 MG/ML
12.5 INJECTION, SOLUTION INTRAMUSCULAR; INTRAVENOUS ONCE
Status: COMPLETED | OUTPATIENT
Start: 2022-10-03 | End: 2022-10-03

## 2022-10-03 RX ORDER — MELOXICAM 15 MG/1
15 TABLET ORAL DAILY
Qty: 30 TABLET | Refills: 0 | Status: SHIPPED | OUTPATIENT
Start: 2022-10-03

## 2022-10-03 RX ORDER — METHYLPREDNISOLONE 4 MG/1
TABLET ORAL
Qty: 1 KIT | Refills: 0 | Status: SHIPPED | OUTPATIENT
Start: 2022-10-03

## 2022-10-03 RX ORDER — PROPOFOL 10 MG/ML
INJECTION, EMULSION INTRAVENOUS PRN
Status: DISCONTINUED | OUTPATIENT
Start: 2022-10-03 | End: 2022-10-03 | Stop reason: SDUPTHER

## 2022-10-03 RX ORDER — MORPHINE SULFATE 4 MG/ML
4 INJECTION, SOLUTION INTRAMUSCULAR; INTRAVENOUS
Status: CANCELLED | OUTPATIENT
Start: 2022-10-03

## 2022-10-03 RX ORDER — ACETAMINOPHEN 325 MG/1
650 TABLET ORAL EVERY 6 HOURS
Status: CANCELLED | OUTPATIENT
Start: 2022-10-03

## 2022-10-03 RX ORDER — MIDAZOLAM HYDROCHLORIDE 1 MG/ML
INJECTION INTRAMUSCULAR; INTRAVENOUS PRN
Status: DISCONTINUED | OUTPATIENT
Start: 2022-10-03 | End: 2022-10-03 | Stop reason: SDUPTHER

## 2022-10-03 RX ORDER — LIDOCAINE HYDROCHLORIDE 10 MG/ML
1 INJECTION, SOLUTION EPIDURAL; INFILTRATION; INTRACAUDAL; PERINEURAL
Status: COMPLETED | OUTPATIENT
Start: 2022-10-03 | End: 2022-10-03

## 2022-10-03 RX ORDER — MIDAZOLAM HYDROCHLORIDE 1 MG/ML
2 INJECTION INTRAMUSCULAR; INTRAVENOUS
Status: DISCONTINUED | OUTPATIENT
Start: 2022-10-03 | End: 2022-10-03 | Stop reason: HOSPADM

## 2022-10-03 RX ORDER — DEXAMETHASONE SODIUM PHOSPHATE 4 MG/ML
4 INJECTION, SOLUTION INTRA-ARTICULAR; INTRALESIONAL; INTRAMUSCULAR; INTRAVENOUS; SOFT TISSUE
Status: DISCONTINUED | OUTPATIENT
Start: 2022-10-03 | End: 2022-10-03 | Stop reason: HOSPADM

## 2022-10-03 RX ORDER — MAGNESIUM SULFATE HEPTAHYDRATE 500 MG/ML
INJECTION, SOLUTION INTRAMUSCULAR; INTRAVENOUS PRN
Status: DISCONTINUED | OUTPATIENT
Start: 2022-10-03 | End: 2022-10-03 | Stop reason: SDUPTHER

## 2022-10-03 RX ORDER — SCOLOPAMINE TRANSDERMAL SYSTEM 1 MG/1
1 PATCH, EXTENDED RELEASE TRANSDERMAL ONCE
Status: DISCONTINUED | OUTPATIENT
Start: 2022-10-03 | End: 2022-10-03 | Stop reason: HOSPADM

## 2022-10-03 RX ORDER — MORPHINE SULFATE 2 MG/ML
2 INJECTION, SOLUTION INTRAMUSCULAR; INTRAVENOUS
Status: CANCELLED | OUTPATIENT
Start: 2022-10-03

## 2022-10-03 RX ORDER — ACETAMINOPHEN 325 MG/1
650 TABLET ORAL
Status: DISCONTINUED | OUTPATIENT
Start: 2022-10-03 | End: 2022-10-03 | Stop reason: HOSPADM

## 2022-10-03 RX ORDER — MAGNESIUM SULFATE IN WATER 40 MG/ML
2000 INJECTION, SOLUTION INTRAVENOUS ONCE
Status: DISCONTINUED | OUTPATIENT
Start: 2022-10-03 | End: 2022-10-03 | Stop reason: HOSPADM

## 2022-10-03 RX ORDER — CEFUROXIME SODIUM 750 MG/1
INJECTION, POWDER, FOR SOLUTION INTRAMUSCULAR; INTRAVENOUS PRN
Status: DISCONTINUED | OUTPATIENT
Start: 2022-10-03 | End: 2022-10-03 | Stop reason: SDUPTHER

## 2022-10-03 RX ADMIN — PHENYLEPHRINE HYDROCHLORIDE 100 MCG: 10 INJECTION INTRAVENOUS at 08:08

## 2022-10-03 RX ADMIN — FENTANYL CITRATE 100 MCG: 50 INJECTION INTRAMUSCULAR; INTRAVENOUS at 07:40

## 2022-10-03 RX ADMIN — HYDROMORPHONE HYDROCHLORIDE 0.5 MG: 1 INJECTION, SOLUTION INTRAMUSCULAR; INTRAVENOUS; SUBCUTANEOUS at 10:00

## 2022-10-03 RX ADMIN — PHENYLEPHRINE HYDROCHLORIDE 100 MCG: 10 INJECTION INTRAVENOUS at 08:03

## 2022-10-03 RX ADMIN — ONDANSETRON 4 MG: 2 INJECTION INTRAMUSCULAR; INTRAVENOUS at 11:00

## 2022-10-03 RX ADMIN — SUGAMMADEX 200 MG: 100 INJECTION, SOLUTION INTRAVENOUS at 09:36

## 2022-10-03 RX ADMIN — SODIUM CHLORIDE, SODIUM LACTATE, POTASSIUM CHLORIDE, AND CALCIUM CHLORIDE: .6; .31; .03; .02 INJECTION, SOLUTION INTRAVENOUS at 08:52

## 2022-10-03 RX ADMIN — ONDANSETRON 4 MG: 2 INJECTION INTRAMUSCULAR; INTRAVENOUS at 12:33

## 2022-10-03 RX ADMIN — Medication 0.5 MG: at 10:00

## 2022-10-03 RX ADMIN — MIDAZOLAM HYDROCHLORIDE 2 MG: 2 INJECTION, SOLUTION INTRAMUSCULAR; INTRAVENOUS at 07:40

## 2022-10-03 RX ADMIN — ROCURONIUM BROMIDE 20 MG: 10 SOLUTION INTRAVENOUS at 08:54

## 2022-10-03 RX ADMIN — CELECOXIB 200 MG: 100 CAPSULE ORAL at 06:58

## 2022-10-03 RX ADMIN — ROCURONIUM BROMIDE 50 MG: 10 SOLUTION INTRAVENOUS at 07:40

## 2022-10-03 RX ADMIN — SODIUM CHLORIDE, POTASSIUM CHLORIDE, SODIUM LACTATE AND CALCIUM CHLORIDE: 600; 310; 30; 20 INJECTION, SOLUTION INTRAVENOUS at 07:03

## 2022-10-03 RX ADMIN — PHENYLEPHRINE HYDROCHLORIDE 100 MCG: 10 INJECTION INTRAVENOUS at 08:26

## 2022-10-03 RX ADMIN — MAGNESIUM SULFATE HEPTAHYDRATE 2 G: 500 INJECTION, SOLUTION INTRAMUSCULAR; INTRAVENOUS at 08:00

## 2022-10-03 RX ADMIN — PROMETHAZINE HYDROCHLORIDE 12.5 MG: 25 INJECTION INTRAMUSCULAR; INTRAVENOUS at 13:51

## 2022-10-03 RX ADMIN — CEFUROXIME 2000 MG: 750 INJECTION, POWDER, FOR SOLUTION INTRAMUSCULAR; INTRAVENOUS at 08:00

## 2022-10-03 RX ADMIN — ACETAMINOPHEN 1000 MG: 500 TABLET ORAL at 06:58

## 2022-10-03 RX ADMIN — LIDOCAINE HYDROCHLORIDE 3 ML: 10 INJECTION, SOLUTION EPIDURAL; INFILTRATION; INTRACAUDAL; PERINEURAL at 07:40

## 2022-10-03 RX ADMIN — PROPOFOL 200 MG: 10 INJECTION, EMULSION INTRAVENOUS at 07:40

## 2022-10-03 RX ADMIN — PHENYLEPHRINE HYDROCHLORIDE 100 MCG: 10 INJECTION INTRAVENOUS at 07:55

## 2022-10-03 RX ADMIN — SODIUM CHLORIDE, SODIUM LACTATE, POTASSIUM CHLORIDE, AND CALCIUM CHLORIDE: .6; .31; .03; .02 INJECTION, SOLUTION INTRAVENOUS at 07:18

## 2022-10-03 ASSESSMENT — PAIN DESCRIPTION - DESCRIPTORS
DESCRIPTORS: ACHING;BURNING
DESCRIPTORS: ACHING

## 2022-10-03 ASSESSMENT — PAIN DESCRIPTION - LOCATION
LOCATION: HIP
LOCATION: HIP

## 2022-10-03 ASSESSMENT — PAIN SCALES - GENERAL
PAINLEVEL_OUTOF10: 0
PAINLEVEL_OUTOF10: 3
PAINLEVEL_OUTOF10: 10

## 2022-10-03 ASSESSMENT — PAIN DESCRIPTION - FREQUENCY: FREQUENCY: CONTINUOUS

## 2022-10-03 ASSESSMENT — PAIN DESCRIPTION - PAIN TYPE: TYPE: ACUTE PAIN;SURGICAL PAIN

## 2022-10-03 ASSESSMENT — PAIN DESCRIPTION - ORIENTATION
ORIENTATION: RIGHT
ORIENTATION: RIGHT

## 2022-10-03 NOTE — OP NOTE
Orthopaedic Surgery  Operative Report      Patient Name:  Gale Orellana  Patient :  1950  MRN: [id number]    Date: 10/03/22     Pre-operative Diagnosis:   M16.11 Right hip primary osteoarthritis  Obesity, BMI 35.07    Post-operative Diagnosis:    Same    Procedure: RIGHT  53370 Total Hip Arthroplasty, direct anterior  Modifier 22    Surgeon:  Surgeon(s) and Role:     * Hernan Cason MD - Primary    Assistant: Circulator: Soo Matson RN  Surgical Assistant: Chemo Fiore  Radiology Technologist: Dorcas Shelton  Scrub Person First: Mis Stokes    Anesthesia: General endotracheal anesthesia and Intraoperative local infiltration - Exparel/marcaine solution    Estimated blood loss: 200    Specimens: * No specimens in log *    Complications: None    Drains: None    Condition: Stable    Implants:   Implant Name Type Inv. Item Serial No.  Lot No. LRB No. Used Action   G7 VIT E NEUTRAL LNR 36MM E - [id number]  G7 VIT E NEUTRAL LNR 36MM E  [employer]- [id number] Right 1 Implanted   SHELL ACET SZ E [id number] 3 H OSSEOTI LIMIT H 2 MOBILITY G7 - [id number]  SHELL ACET SZ E [id number] 3 H OSSEOTI LIMIT H 2 MOBILITY G7  [employer]- [id number] Right 1 Implanted   AVENIR COMPLETE STANDARD COLLARLESS SIZE 1 - [id number]  Avenir Complete Standard Collarless Size 1  [employer]- [id number] Right 1 Implanted   HEAD FEM [id number] NK L+0MM 12/14 TAPR ACET HIP BIOLOX DELT - [id number]  HEAD FEM [id number] NK L+0MM 12/14 TAPR ACET HIP BIOLOX DELT  [employer]- [id number] Right 1 Implanted     Findings:   1. End stage OA  2. Preop leg length inequality, right side longer by approximately 3 mm  3. Previous history of contralateral hip replacement otherwise asymptomatic    Indications: The patient has been battling right hip pain for months to years. Pain has gotten worse recently. Patient has failed all preoperative conservative treatment options.   The activities of daily living have been affected quite a bit. Patient wanted to regain mobility and be active as possible. Patient understood the risk benefits and alternatives in detail and wanted to proceed with the above operation. Procedure Details:   I marked the surgical site of the right hip for surgery. He was taken back to the operating theater laid supine the table the bony prominences well-padded. General anesthesia was induced. We transferred the patient to the operating table, [place] bed. X-ray was acquired marking the right hip as the preoperative templating hip. Antibiotics 2 g of Ancef were given. MRSA swab testing was performed preop, and no additional antibiotics were required. Tranexamic acid 1 g was given at start. We began with a direct anterior approach of the hip. Holland-Henderson interval was taken. We incised the tensor fascia guillermo. We bluntly developed a plane between that and the rectus. Lateral edge of the rectus fascia was incised the muscle belly was retracted medially. The underlying fascia was also incised. Underlying vessels lateral circumflex were tied off on each end with silk suture. Electro Bovie cautery was then used to incise through the capsule. A femoral neck osteotomy was performed based on preoperative template. Using a corkscrew device we removed the existing head ball. We then placed retractors around the acetabulum. I cleaned out the pelvic tissue as well as the existing labrum. Sequential reaming was then performed. We stopped at the appropriately sized reamer and impacted the real acetabular shell. X-ray confirmed that the socket was in acceptable position based off of a good AP pelvis x-ray. Standard poly liner was used. We then turned our attention to the femoral exposure. The [place] table femoral elevating hook was then placed just inside the fascia but lateral to the vastus. This went around the posterior edge of the femur.   Leg was then dropped to the floor and abducted. 90 degrees of external rotation was achieved. We then performed small capsulotomy posteriorly to place a 1 #1 retractor. Placed a #3 Shira retractor medially. I use the table hook to elevate the femur for exposure. I externally rotated more to deliver the femoral neck via the [place] table. The femur was prepped using a box chisel, canal finder and sequential broaching only. We are happy with the appropriately sized stem. Trial was then placed with a -3.5 head ball first.  The hip was then reduced using standard technique. X-ray confirmed the implants were in reasonable position. We then redislocated and remove the existing trial and implanted the real stem femoral component. I was able to sync down the broach a few millimeters compared to the trial x-ray. An extra head ball length given the slightly more offset to match the opposite side. 0 head ball was then inserted and impacted. Hip reduction was then performed. We performed anterior and posterior hip stability checks which were successful. We also performed shuck test which is very acceptable with the existing tension. We performed sequential closure. I irrigated the wound thoroughly with 3 L normal saline. I placed 2 g of vancomycin powder around the wound. I also injected a mixture of Marcaine and Exparel into the perioperative field. Adequate hemostasis was achieved. #2 Ethibond approximated the capsular leaflets. #2 Quill suture approximated the fascial layer as well as the deep subcutaneous layer to remove dead space. 2-0 Vicryl interrupted sutures closed the subcutaneous tissue layer. Monocryl subcuticular suture was then applied. Dermabond Prineo was then used with a nonadhesive dressing. Patient then was reversed in general esthesia transferred back the postoperative care unit without any complications. All instrument counts were correct x2.   I was present throughout the entire to the case with the exception of skin closure. Modifier 22: Increased time and complexity  This case took approximately 25% more time as a result of patient body habitus and obesity. Patient's BMI is 35.07. Extra time and care was taken positioning, instrumentation and exposure, and proper implantation. This, in no way, signifies that the ultimate outcome was compromised in any way.     PLAN:  - WBAT with assist device  - aspirin 81 mg BID  - ambulate postop with PT  - resume home meds, diet  - f/u scheduled with me in 2-3 weeks  - dispo: Plan for discharge today, therapy in PACU      Electronically signed by Aggie Watson MD on 10/3/2022 at 9:42 AM

## 2022-10-03 NOTE — ADDENDUM NOTE
Addendum  created 10/03/22 1343 by Doll Schaumann, MD    Order list changed, Pharmacy for encounter modified

## 2022-10-03 NOTE — PROGRESS NOTES
Dc inst given to  and pt. Verb understanding. Pt has been given discharge instructions and verbalizes understanding of the following: Dressing and incision care. Post op medications including when to take and possible side effects of the following medications: Anticoagulation, pain medication, and stool softeners. How to relieve pain and swelling through non- pharmacological comfort measures. When to call the office for questions or concerns, when to follow up with surgeon, and instructions on use of the incentive spirometer. Pt has worked with physical therapy and has received a list of exercises and has discussed precautions. Pt has been educated on use of ambulation aides, bed mobility, fall safety, bathing safety, and when to start physical therapy. Pt has been educated on signs and symptoms of infection, inadequate pain control, and when and who to call for changes in condition. Pt is able to tolerate PO, pt has voided post-op, and pt has ambulated safely with physical therapy post-op. Pt has met discharge criteria.

## 2022-10-03 NOTE — PROGRESS NOTES
Occupational Therapy  Facility/Department: [facility] OR  Occupational Therapy Initial Assessment/Treat/Discharge  1x only    Name: Linnette Ag  : 1950  MRN: [id number]  Date of Service: 10/3/2022    Discharge Recommendations:  24 hour supervision or assist          Patient Diagnosis(es): The primary encounter diagnosis was Primary localized osteoarthritis of right hip. A diagnosis of Primary osteoarthritis of right hip was also pertinent to this visit. Past Medical History:  has a past medical history of Arthritis, Hyperlipemia, and Primary osteoarthritis of right hip. Past Surgical History:  has a past surgical history that includes Colonoscopy; Dilation and curettage of uterus; joint replacement (); Endoscopy, colon, diagnostic; Breast surgery (Left); Wound debridement; Colonoscopy (N/A, 2019); and Breast lumpectomy (Left). Assessment   Performance deficits / Impairments: Decreased functional mobility ; Decreased endurance;Decreased ADL status; Decreased balance;Decreased safe awareness  Patient seen in [facility] after R anterior THR on 10/3/22. Today patient limited my nausea, otherwise CGA for transfers, mobility with RW and modA for LE dressing. Pt and spouse educated on safe car transfers and verbalized understanding. No further OT needs, all questions answered. Pt to d/c home today.     Prognosis: Good  Decision Making: Low Complexity           Plan   Occupational Therapy Plan  Times Per Week: 1x only     Restrictions  Restrictions/Precautions  Restrictions/Precautions: Weight Bearing, ROM Restrictions  Lower Extremity Weight Bearing Restrictions  Right Lower Extremity Weight Bearing: Weight Bearing As Tolerated  Position Activity Restriction  Hip Precautions: Anterior hip precautions  Other position/activity restrictions: RLE: No SLR    Subjective   General  Chart Reviewed: Yes, Orders, History and Physical, Progress Notes, Operative Notes  Patient assessed for rehabilitation services?: Yes  Family / Caregiver Present: Yes (spouse)  Referring Practitioner: Santino Jang PA-C/Dr. Burton Velasco 10/02/22  Diagnosis: R hip OA, s/p R THR anterior approach 10/03/22  Subjective  Subjective: Patient reports feeling nauseas, completed toilet transfers with nursing using BSC prior to OT session. Social/Functional History  Social/Functional History  Lives With: Spouse  Type of Home: House  Home Layout: One level  Home Access: Stairs to enter with rails  Entrance Stairs - Number of Steps: 2 KWABENA  Entrance Stairs - Rails: Left  Bathroom Shower/Tub: Walk-in shower  Bathroom Toilet: Handicap height  Bathroom Equipment: Hand-held shower, Grab bars in shower, Built-in shower seat, Grab bars around toilet  Home Equipment: Walker, rolling  Has the patient had two or more falls in the past year or any fall with injury in the past year?: Yes (fell 1 week ago carry too much stuff)  ADL Assistance: [address] [place] [address]: Independent  Homemaking Responsibilities: Yes  Ambulation Assistance: Independent  Transfer Assistance: Independent  Active : Yes  Occupation: Retired  Leisure & Hobbies: spend time with grandchildren       Objective   Vitals: 121/71, HR 85 bpm, O2 92% on RA   Pain:7/10 R knee     Safety Devices  Type of Devices: Left in bed;Call light within reach;Gait belt;Nurse notified  Bed Mobility Training  Bed Mobility Training: Yes  Supine to Sit: Supervision; Additional time (to left from flat bed level)  Sit to Supine: Other (comment) (left with PT)    Balance  Sitting: Intact  Standing: Impaired  Standing - Static: Occasional  Standing - Dynamic: Constant support;Good;Fair    Transfer Training  Transfer Training: Yes  Sit to Stand: Contact-guard assistance (up to RW)  Stand to Sit: Contact-guard assistance  Car Transfer: Other (comment)    Gait  Assistive Device: Walker, rolling, attempted to ambulate towards bathroom, patient with spell of nausea, vomiting and urination.  Pt cleaned up appropriately, ambulation back towards bed after this. RN aware and administered meds. AROM: Within functional limits  PROM: Within functional limits  Strength: Within functional limits  Coordination: Within functional limits  Tone: Normal  Sensation: Intact    ADL  Feeding: Independent  UE Dressing: Independent  LE Dressing: Minimal assistance (dondianelys briefs)      Cognition  Overall Cognitive Status: Exceptions  Arousal/Alertness: Appropriate responses to stimuli  Following Commands: Follows multistep commands with increased time; Follows multistep commands with repitition  Attention Span: Difficulty dividing attention  Memory: Appears intact  Safety Judgement: Decreased awareness of need for safety  Problem Solving: Assistance required to correct errors made  Insights: Decreased awareness of deficits  Initiation: Does not require cues  Sequencing: Requires cues for some  Orientation  Overall Orientation Status: Within Functional Limits      Education Given To: Patient  Education Provided: Role of Therapy;Plan of Care;IADL Safety; Family Education; ADL Adaptive Strategies;Transfer Training;Equipment  Education Method: Demonstration;Verbal;Printed Information/Hand-outs; Teach Back  Barriers to Learning: None  Education Outcome: Verbalized understanding;Demonstrated understanding    Patient Educated in safety with car transfers and  dispensed instruction on car transfers with use of assistive device with patient demonstrating:  [x] Verbalizing understanding of appropriate technique, maintaining any ordered precautions for car transfer training s/p TJR  [] [place] with simulated car transfer with walker  [] He/she requires  assist and will have someone at discharge to help with transfers with patient verbalizing understanding of any ordered TJR precautions employing appropriate technique.   [x] Other: Issued THR folder with paper handout of car transfer technique             AM-PAC Score        AM-PAC Inpatient Daily Activity Raw Score: 18 (10/03/22 1413)  AM-PAC Inpatient ADL T-Scale Score : 38.66 (10/03/22 1413)  ADL Inpatient CMS 0-100% Score: 46.65 (10/03/22 1413)  ADL Inpatient CMS G-Code Modifier : CK (10/03/22 1413)    Goals  Short Term Goals  Time Frame for Short Term Goals: 1 session  Short Term Goal 1: Pt will verbalize understanding of safe car transfer after education-stg met 10/03/22  Patient Goals   Patient goals : \"to be able to stand w/o physical assistance (SBA)  from the chair\"-not met       Therapy Time   Individual Concurrent Group Co-treatment   Time In 1325         Time Out 1359         Minutes 34         Timed Code Treatment Minutes: 24 Minutes       EULALIO Garrido/L

## 2022-10-03 NOTE — ANESTHESIA POSTPROCEDURE EVALUATION
Department of Anesthesiology  Postprocedure Note    Patient: Nel Stiles  MRN: [id number]  YOB: 1950  Date of evaluation: 10/3/2022      Procedure Summary     Date: 10/03/22 Room / Location: [address] [place] [address]    Anesthesia Start: 4232 Anesthesia Stop:     Procedure: RIGHT TOTAL HIP ARTHROPLASTY MINIMALLY INVASIVE DIRECT ANTERIOR                 Victory Ebbs (Right: Hip) Diagnosis:       Primary osteoarthritis of right hip      (RIGHT HIP PRIMARY OSTEOARTHRITIS)    Surgeons: Feng Nunes MD Responsible Provider: Jigna Pierce MD    Anesthesia Type: general ASA Status: 2          Anesthesia Type: No value filed.     Aliyah Phase I: Aliyah Score: 8    Aliyah Phase II: Aliyah Score: 10      Anesthesia Post Evaluation    Patient location during evaluation: PACU  Patient participation: complete - patient participated  Level of consciousness: awake  Pain score: 2  Airway patency: patent  Nausea & Vomiting: no nausea  Complications: no  Cardiovascular status: blood pressure returned to baseline  Respiratory status: acceptable  Hydration status: euvolemic

## 2022-10-03 NOTE — DISCHARGE INSTRUCTIONS
Total Hip & Bipolar Replacement  Discharge Instructions    To prevent Clot formation, you have been placed on the following medication:  Take aspirin 81 mg twice a day starting day after surgery   Surgical Site Care:  Keep incision clean and dry. May shower on post-op day #3 with waterproof dressing on. Change to new waterproof dressing between 5-7 days. Physical Therapy:  Weight Bearing Status:     Weight bearing as tolerated  Precautions  Per Physical Therapy handout  No straight leg raise   Pain Medications  You were given oxycodone (Oxycontin, Oxyir)  Wean off pain medications as you deem appropriate as long as pain is under control  Be sure to drink plenty of fluids (recommend water) while taking narcotic pain medications to prevent constipation  You may take an over the counter laxative or stool softener as needed to prevent/treat constipation as well, we recommend Senokot S OTC. We recommend that you consider taking these medications the entire time you are taking pain medication. Cold packs/Ice packs/Machine  May be used as much as necessary to reduce swelling/inflammation/soreness  Be sure to have a barrier (cloth, clothing, towel) between your skin/incision and the ice pack to prevent frostbite  Contact office if  Increased redness, swelling, drainage of any kind, and/or pain to surgery site. As well as new onset fevers and or chills. These could signify an infection. Calf or thigh tenderness to touch as well as increased swelling or redness. This could signify a clot formation. Numbness or tingling to an area around the incision site or below the incision site (toes). Any rash appears, increased  or new onset nausea/vomiting occur. This may indicate a reaction to a medication. Phone # [phone]  Follow up with Dr. Samira Carrillo or Deangelo Moore PA-C at scheduled appointment time. Please continue to use your Incentive Spirometer at home every hour while awake.   Discharge Medications    Narcotic Pain Medications    ____ Hydrocodone/acetaminophen 5/325mg 1 tab every 4 hours as needed for pain  __x__ Oxycodone 5mg 1 tab every 6 hours as needed for pain  ____ Tramadol 50mg 1 tab every 4 hours as needed for pain    Anti-inflammatory/Pain Medication    __x__ Meloxicam 15mg 1 tab once a day  ____ Celebrex 200mg 1 tab once a day  __x_ Medrol Dosepak 1 kit as directed (start post-op day 1)    If Medrol Dosepak and either Meloxicam or Celebrex is prescribed complete the Medrol Dosepak before starting Meloxicam or Celebrex    Anti-coagulation Medication  __x__ Aspirin 81mg 1 tab twice per day  ____ Eliquis 2.5mg 1 tab twice per day  ____ Lovenox 40mg once per day  ____ Lovenox 30mg twice per day    Start anti-coagulation medications the day after surgery    Muscle relaxer     __x__ Cyclobenzaprine 10mg 1 tab up to 3 times a day as needed for muscle spasms and pain  ____ Methocarbamol 750mg 1 tab up to 3 times a day as needed for muscle spasms and pain          Nausea/Vomiting Medications    __x__ Ondansetron 4 mg 1 tab up to 4 times daily as needed  ____ Promethazine 25mg 1 tab up to 3 times daily as needed    Antibiotics    __x__ Keflex 500mg 1 tab 4 times daily  ____ Sulfamethoxazole/trimethoprim 800/160mg 1 tab twice per day  ____ Clindamycin 300mg 2 tabs twice per day     *** Please contact [facility] Nurse Navigator with any questions or concerns after your discharge. *** Mon- Fri 9am- 5pm [phone]. If you have any issues or concerns after 5pm or on the weekend please call your surgeon's office. I will be contacting you in a few days to follow up. If you need a pain medication refill please contact your surgeon's office.

## 2022-10-03 NOTE — PROGRESS NOTES
Physical Therapy  Facility/Department: [facility] OR  Physical Therapy Initial Assessment    Name: Georgia Osborn  : 1950  MRN: [id number]  Date of Service: 10/3/2022    Discharge Recommendations:  24 hour supervision or assist, Outpatient PT   PT Equipment Recommendations  Equipment Needed: No  Other: pt owns rec RW      Patient Diagnosis(es): The primary encounter diagnosis was Primary localized osteoarthritis of right hip. A diagnosis of Primary osteoarthritis of right hip was also pertinent to this visit. Past Medical History:  has a past medical history of Arthritis, Hyperlipemia, and Primary osteoarthritis of right hip. Past Surgical History:  has a past surgical history that includes Colonoscopy; Dilation and curettage of uterus; joint replacement (); Endoscopy, colon, diagnostic; Breast surgery (Left); Wound debridement; Colonoscopy (N/A, 2019); and Breast lumpectomy (Left).     Assessment   Body Structures, Functions, Activity Limitations Requiring Skilled Therapeutic Intervention: Decreased functional mobility   Assessment: pt is 80 yo female admit to [facility] for R THR, seen in [facility] for gait assessment, bed mobility assessment, and transfer assessment, pt was independent prior to admit, pt presents requiring CGA for ambulation of 30ft with RW as pt displays R foot drop from anasthesia still, pt has 24hr assist from spouse, educated pt and spouse regarding protocol therex and to ambulate each hour per MD order; PT recommends pt return home with 24hr assist and pt-owned RW, initiate OP PT per MD order  Treatment Diagnosis: decreased functional mobility  Therapy Prognosis: Excellent  Decision Making: Low Complexity  Barriers to Learning: post-op lethargy  Requires PT Follow-Up: No  Activity Tolerance  Activity Tolerance: Patient tolerated evaluation without incident  Activity Tolerance Comments: O2 95% on room air at end of session     Plan   Physcial Therapy Plan  General Plan: Discharge with evaluation only  Safety Devices  Type of Devices: Call light within reach, Gait belt, Patient at risk for falls, Left in bed, Nurse notified     Restrictions  Restrictions/Precautions  Restrictions/Precautions: Weight Bearing, ROM Restrictions  Lower Extremity Weight Bearing Restrictions  Right Lower Extremity Weight Bearing: Weight Bearing As Tolerated  Position Activity Restriction  Hip Precautions: Anterior hip precautions  Other position/activity restrictions: RLE: No SLR     Subjective   Pain: 7/10 R knee  General  Chart Reviewed: Yes  Patient assessed for rehabilitation services?: Yes  Additional Pertinent Hx: R LE longer than L pre-op  Response To Previous Treatment: Not applicable  Family / Caregiver Present: Yes (Spouse Chuy, supportive, in good health)  Referring Practitioner: Geovanny Knox  Referral Date : 10/03/22  Diagnosis: R THR  Follows Commands: Within Functional Limits  General Comment  Comments: RN cleared pt for PT  Subjective  Subjective: pt agreeable to PT eval, lethargic         Social/Functional History  Social/Functional History  Lives With: Spouse  Type of Home: House  Home Layout: One level  Home Access: Stairs to enter with rails  Entrance Stairs - Number of Steps: 2 KWABENA  Entrance Stairs - Rails: Left  Bathroom Shower/Tub: Walk-in shower  Bathroom Toilet: Handicap height  Bathroom Equipment: Hand-held shower, Grab bars in shower, Built-in shower seat, Grab bars around toilet  Home Equipment: Walker, rolling  Has the patient had two or more falls in the past year or any fall with injury in the past year?: Yes (fell 1 week ago carry too much stuff)  ADL Assistance: Independent  Homemaking Assistance: Independent  Homemaking Responsibilities: Yes  Ambulation Assistance: Independent  Transfer Assistance: Independent  Active : Yes  Occupation: Retired  Leisure & Hobbies: spend time with grandchildren  Vision/Hearing  Vision  Vision: Within Functional Limits  Hearing  Hearing: Within functional limits    Cognition   Orientation  Overall Orientation Status: Within Functional Limits  Cognition  Overall Cognitive Status: Exceptions  Arousal/Alertness: Appropriate responses to stimuli  Following Commands: Follows multistep commands with increased time; Follows multistep commands with repitition  Attention Span: Difficulty dividing attention  Memory: Appears intact  Safety Judgement: Decreased awareness of need for safety  Problem Solving: Assistance required to correct errors made  Insights: Decreased awareness of deficits  Initiation: Does not require cues  Sequencing: Requires cues for some     Objective   Heart Rate: 74  Heart Rate Source: Monitor  BP: 131/81  Patient Position: Right side;Semi fowlers  MAP (Calculated): 97.67  Resp: 16  SpO2: 91 %  O2 Device: None (Room air)  Comment: Vitalsl: 121/71, HR 85 bpm     Observation/Palpation  Posture: Good  Observation: R foot drop during gait assessment  Gross Assessment  AROM: Grossly decreased, non-functional (no hip flexion)  Strength: Grossly decreased, non-functional (R LE ankle DF 2/5 , PF WFL)  Coordination: Generally decreased, functional  Tone: Abnormal (R foot drop)  Sensation: Impaired (R foot numb per pt report)                 Bed Mobility Training  Bed Mobility Training: Yes  Supine to Sit: Supervision; Additional time (to left from flat bed level)  Sit to Supine: Other (comment) (left with PT)  Balance  Sitting: Intact  Standing: Impaired  Standing - Static: Occasional  Standing - Dynamic: Constant support;Good;Fair  Transfer Training  Transfer Training: Yes  Sit to Stand: Contact-guard assistance (up to RW)  Stand to Sit: Contact-guard assistance  Car Transfer: Other (comment)  Gait  Assistive Device: Walker, rolling  Bed mobility  Sit to Supine: Minimal assistance (educated spouse to assist with R LE , demonstrated understanding with appropriate technique)  Bed Mobility Comments: pt with OT upon arrival  Transfers  Sit to Stand: Contact guard assistance (from armchair and bed)  Stand to Sit: Stand by assistance  Ambulation  Surface: Level tile  Device: Rolling Walker  Assistance: Contact guard assistance  Quality of Gait: R foot drop requiring slight hip hike , decreased stance time R LE, step-to pattern  Gait Deviations: Slow Beverley  Distance: 30ft  More Ambulation?: No  Stairs/Curb  Stairs?: Yes  Stairs  # Steps : 1  Stairs Height: 6\"  Rails: None  Device: Rolling walker  Assistance: Contact guard assistance  Comment: educated pt and spouse regarding stair training including technique and assist     Balance  Posture: Good  Sitting - Static: Good  Sitting - Dynamic: Good  Standing - Static: Fair;+  Standing - Dynamic: Fair  Exercise Treatment: THR protocol, provided printed info, educated spouse        AM-PAC Score  AM-PAC Inpatient Mobility Raw Score : 18 (10/03/22 1445)  AM-PAC Inpatient T-Scale Score : 43.63 (10/03/22 1445)  Mobility Inpatient CMS 0-100% Score: 46.58 (10/03/22 1445)  Mobility Inpatient CMS G-Code Modifier : CK (10/03/22 1445)        Goals  Short Term Goals  Time Frame for Short Term Goals: one visit  Short Term Goal 1: pt will transfer sit to supine with min A - MET  Short Term Goal 2: pt will negotiate one step with RW with Min A - MET  Short Term Goal 3: pt will tolerate and demonstrate THR protocol therex x 10 reps - MET  Short Term Goal 4: pt will transfer sit to and from stand with CGA - MET  Patient Goals   Patient Goals : go home       Education  Patient Education  Education Given To: Patient; Family  Education Provided: Role of Therapy;Plan of Care;Home Exercise Program;Precautions;Transfer Training;Family Education;Equipment  Education Provided Comments: educated pt regarding post-op protocol  Education Method: Demonstration;Verbal;Printed Information/Hand-outs  Barriers to Learning: Other (Comment) (post-op lethargy)  Education Outcome: Verbalized understanding;Demonstrated understanding      Therapy Time Individual Concurrent Group Co-treatment   Time In 1340         Time Out 1421         Minutes 41         Timed Code Treatment Minutes: [address] [place]       Nohemi Luke, PT

## 2022-10-03 NOTE — ANESTHESIA PRE PROCEDURE
Department of Anesthesiology  Preprocedure Note       Name:  Laura Prajapati   Age:  81 y.o.  :  1950                                          MRN:  [id number]         Date:  10/3/2022      Surgeon: Chiara Beckham): Alejandro Myers MD    Procedure: Procedure(s):  RIGHT TOTAL HIP ARTHROPLASTY MINIMALLY INVASIVE DIRECT ANTERIOR                 INGRID BIOMET    Medications prior to admission:   Prior to Admission medications    Medication Sig Start Date End Date Taking?  Authorizing Provider   mupirocin (BACTROBAN) 2 % ointment Apply twice daily to each nare for the 5 days prior to surgical procedure 22   Slade العلي PA-C   pravastatin (PRAVACHOL) 80 MG tablet Take 1 every evening for high cholesterol 22   [facility], DO       Current medications:    Current Facility-Administered Medications   Medication Dose Route Frequency Provider Last Rate Last Admin    tranexamic acid (CYKLOKAPRON) 1,000 mg in sodium chloride 0.9 % 100 mL IVPB  1,000 mg IntraVENous On Call to [address]        Followed by   Panchito Lindquist tranexamic acid (CYKLOKAPRON) 1,000 mg in sodium chloride 0.9 % 100 mL IVPB  1,000 mg IntraVENous On Call to [address] [employer] [facility]        sodium chloride flush 0.9 % injection 5-40 mL  5-40 mL IntraVENous 2 times per day Slade العلي PA-C        sodium chloride flush 0.9 % injection 5-40 mL  5-40 mL IntraVENous PRN Slade العلي PA-C        0.9 % sodium chloride infusion   IntraVENous PRN Slade العلي PA-C        ceFAZolin (ANCEF) 2000 mg in dextrose 5 % 100 mL IVPB  2,000 mg IntraVENous On Call to [address]        lidocaine PF 1 % injection 1 mL  1 mL IntraDERmal Once PRN Harrison Levy MD        acetaminophen (TYLENOL) tablet 1,000 mg  1,000 mg Oral Once Harrison Levy MD        celecoxib (CELEBREX) capsule 200 mg  200 mg Oral Once Harrison Levy MD        lactated ringers infusion   IntraVENous Continuous Harrison Levy MD  sodium chloride flush 0.9 % injection 5-40 mL  5-40 mL IntraVENous 2 times per day Magdalene Lemons MD        sodium chloride flush 0.9 % injection 5-40 mL  5-40 mL IntraVENous PRN Magdalene Lemons MD        0.9 % sodium chloride infusion   IntraVENous PRN Magdalene Lemons MD        magnesium sulfate 2000 mg in 50 mL IVPB premix  2,000 mg IntraVENous Once Magdalene Lemons MD           Allergies: Allergies   Allergen Reactions    Dye [Iodides] Hives     IVP before 1990    Tylox [Oxycodone-Acetaminophen] Nausea Only    Demerol Hcl [Meperidine] Nausea And Vomiting       Problem List:    Patient Active Problem List   Diagnosis Code    Abnormal EKG R94.31    Patient overweight E66.3    Microhematuria R31.29    Right hip pain M25.551    Mouth Candida infection B37.0    Toe pain M79.676    Patient overweight E66.3    Ingrown toenail without infection L60.0    Elevated liver enzymes R74.8    Left lower quadrant pain R10.32    TIA involving left internal carotid artery G45.1    Right arm numbness R20.0    Dyslipidemia E78.5    Primary osteoarthritis of right hip M16.11    Left shoulder pain M25.512    Right knee pain M25.561    Rotator cuff impingement syndrome of left shoulder M75.42       Past Medical History:        Diagnosis Date    Arthritis     Hyperlipemia 5/7/2013    Primary osteoarthritis of right hip 5/3/2022       Past Surgical History:        Procedure Laterality Date    BREAST LUMPECTOMY Left     BREAST SURGERY Left     breast biopsy    COLONOSCOPY      COLONOSCOPY N/A 01/24/2019    COLON W/ANES.  (10:00) performed by Andrew Liao DO at [address] on L buttocks had to be debrided and sewn shut    DILATION AND CURETTAGE OF UTERUS      ENDOSCOPY, COLON, DIAGNOSTIC      JOINT REPLACEMENT  2008    bilateral knees       Social History:    Social History     Tobacco Use    Smoking status: Never    Smokeless tobacco: Never   Substance Use Topics    Alcohol use: No                                Counseling given: Not Answered      Vital Signs (Current):   Vitals:    09/28/22 1426   Weight: 190 lb (86.2 kg)   Height: 5' 3\" (1.6 m)                                              BP Readings from Last 3 Encounters:   09/29/22 129/83   09/08/22 126/82   07/28/22 (!) 149/98       NPO Status:                                                                                 BMI:   Wt Readings from Last 3 Encounters:   09/28/22 190 lb (86.2 kg)   09/29/22 198 lb (89.8 kg)   09/08/22 197 lb (89.4 kg)     Body mass index is 33.66 kg/m². CBC:   Lab Results   Component Value Date/Time    WBC 6.4 08/01/2022 11:55 AM    RBC 5.08 08/01/2022 11:55 AM    HGB 15.5 08/01/2022 11:55 AM    HCT 46.1 08/01/2022 11:55 AM    MCV 90.8 08/01/2022 11:55 AM    RDW 14.6 08/01/2022 11:55 AM     08/01/2022 11:55 AM       CMP:   Lab Results   Component Value Date/Time     08/01/2022 11:55 AM    K 4.4 08/01/2022 11:55 AM     08/01/2022 11:55 AM    CO2 25 08/01/2022 11:55 AM    BUN 14 08/01/2022 11:55 AM    CREATININE 0.7 08/01/2022 11:55 AM    GFRAA >60 08/01/2022 11:55 AM    GFRAA >60 04/13/2013 08:47 AM    AGRATIO 1.9 07/06/2022 08:54 AM    LABGLOM >60 08/01/2022 11:55 AM    GLUCOSE 87 08/01/2022 11:55 AM    PROT 7.0 07/06/2022 08:54 AM    CALCIUM 10.0 08/01/2022 11:55 AM    BILITOT 0.5 07/06/2022 08:54 AM    ALKPHOS 69 07/06/2022 08:54 AM    AST 27 07/06/2022 08:54 AM    ALT 34 07/06/2022 08:54 AM       POC Tests: No results for input(s): POCGLU, POCNA, POCK, POCCL, POCBUN, POCHEMO, POCHCT in the last 72 hours.     Coags:   Lab Results   Component Value Date/Time    PROTIME 13.5 08/01/2022 11:55 AM    INR 1.05 08/01/2022 11:55 AM    APTT 34.8 08/01/2022 11:55 AM       HCG (If Applicable): No results found for: PREGTESTUR, PREGSERUM, HCG, HCGQUANT     ABGs: No results found for: PHART, PO2ART, LYI2VXJ, DOZ4QLE, BEART, E4KTDFHN     Type & Screen (If Applicable):  No results found for: LABABO, LABRH    Drug/Infectious Status (If Applicable):  No results found for: HIV, HEPCAB    COVID-19 Screening (If Applicable): No results found for: COVID19        Anesthesia Evaluation  Patient summary reviewed and Nursing notes reviewed  Airway: Mallampati: III  TM distance: >3 FB   Neck ROM: full  Mouth opening: > = 3 FB   Dental: normal exam         Pulmonary:Negative Pulmonary ROS and normal exam                               Cardiovascular:Negative CV ROS                      Neuro/Psych:   (+) neuromuscular disease:, TIA,             GI/Hepatic/Renal: Neg GI/Hepatic/Renal ROS            Endo/Other: Negative Endo/Other ROS                    Abdominal:             Vascular: negative vascular ROS. Other Findings:           Anesthesia Plan      general     ASA 2       Induction: intravenous. MIPS: Postoperative opioids intended. Anesthetic plan and risks discussed with patient.         Attending anesthesiologist reviewed and agrees with Preprocedure content                MASON Sher MD   10/3/2022

## 2022-10-03 NOTE — PROGRESS NOTES
Pt up to [facility] with assist x1 to void. Assisted back to bed. Awaiting for PT/OT to see pt.

## 2022-10-04 ENCOUNTER — TELEPHONE (OUTPATIENT)
Dept: ORTHOPEDIC SURGERY | Age: 72
End: 2022-10-04

## 2022-10-04 NOTE — TELEPHONE ENCOUNTER
Spoke with pt, doing well. Incision status: No drainage or redness    Edema/Swelling/Teds: Minimal swelling, discussed and educated about icing. Pt agreeable. Pain level and status: Seems to do being well. Use of pain medications: Has been using 1 héctor every 6 hours. Discussed using tylenol in between dosage. Blood thinner: ASA 81mg BID with no issues. Bowels: Took a ducolax today, discussed and educated about taking a stool softener vs laxative.  will  a stool softener today and will start that tomorrow. Home Care Agency active: NA    Outpatient therapy: Yes    Do you have all of your medications: no    Changes in medications: NA    Instructed pt to call Nurse Navigator or surgeon's office with any questions or concerns.      Follow up appointments:    Future Appointments   Date Time Provider Kwasi Byrne   10/25/2022  2:00 PM Miguel Angel Glez MD AND [facility]

## 2022-10-07 ENCOUNTER — TELEPHONE (OUTPATIENT)
Dept: ORTHOPEDIC SURGERY | Age: 72
End: 2022-10-07

## 2022-10-07 NOTE — TELEPHONE ENCOUNTER
General Question     Subject: patient call and she had sx on her hip on 10/03/22 she stated she has not had a bowel movement in 2 days she would like to know what should she take for that and she would need a call back as soon as possible. Please Advise.   Patient Clint Marrero  Contact Number: [phone]

## 2022-10-12 ENCOUNTER — TELEPHONE (OUTPATIENT)
Dept: ORTHOPEDIC SURGERY | Age: 72
End: 2022-10-12

## 2022-10-12 NOTE — TELEPHONE ENCOUNTER
Attempted to contact pt, left voicemail with purpose and call back number. Sangeeta Verde  Orthopedic Nurse Navigator  Phone number: [phone]    Follow up appointments:    Future Appointments   Date Time Provider Kwasi Byrne   10/25/2022  2:00 PM Demetrice Rubalcava MD AND ORTHO MMA     Signed off from pt. Instructed pt to call RN or Surgeon's office with any issues or concerns.

## 2022-10-25 ENCOUNTER — OFFICE VISIT (OUTPATIENT)
Dept: ORTHOPEDIC SURGERY | Age: 72
End: 2022-10-25

## 2022-10-25 VITALS — WEIGHT: 198 LBS | BODY MASS INDEX: 35.08 KG/M2 | HEIGHT: 63 IN

## 2022-10-25 DIAGNOSIS — Z96.641 S/P TOTAL RIGHT HIP ARTHROPLASTY: Primary | ICD-10-CM

## 2022-10-25 PROCEDURE — 99024 POSTOP FOLLOW-UP VISIT: CPT | Performed by: PHYSICIAN ASSISTANT

## 2022-10-25 NOTE — PROGRESS NOTES
Dr Ankit Melendez      Date /Time 10/25/2022       2:08 PM EDT  Name Cirilo Jameson             1950   Location  [address] [place] [address]  MRN [id number]                Chief Complaint   Patient presents with    Post-Op Check     RIGHT CHANTEL 10/3/22        History of Present Illness      Cirilo Jameson is a 81 y.o. female is here for post-op visit after RIGHT  [address]67 Total Hip Arthroplasty      Patient presents to the office today for a follow-up visit. Patient is 3 weeks status post right anterior total hip arthroplasty. Patient doing well at this time. Pain controlled. Patient denies any fever or chills. Her knee is doing better but she continues to have pain. Physical Exam    Based off 1997 Exam Criteria    Ht 5' 3\" (1.6 m)   Wt 198 lb (89.8 kg)   BMI 35.07 kg/m²      Constitutional:       General: He is not in acute distress. Appearance: Normal appearance. RIGHT Hip: incision clean, intact, healing appropriately. No surrounding  erythema or fluctuance. Neuro intact distal. No evidence of DVT. Imaging       Right Hip: Kenna  Radiographs: X-rays were ordered today reviewed of the right hip.  3 views. AP pelvis, lateral, and false profile. It demonstrates a right total hip arthroplasty in good position. No evidence of loosening or periprosthetic fracture      Assessment and Plan    Tanvi was seen today for post-op check. Diagnoses and all orders for this visit:    S/P total right hip arthroplasty  -     XR HIP RIGHT (2-3 VIEWS); Future        Patient overall is doing very well. She will start physical therapy in 1 week. Patient will follow-up with Dr. Yanick Gomez in 3 months or sooner if problems arise. I have recommended a least 8-10 weeks between surgery and any dental procedures. She expresses understanding and will push out her next dental appointment.     Electronically signed by Ciarra Rosado PA-C on 10/25/2022 at 2:08 PM  This dictation was generated by voice recognition computer software. Although all attempts are made to edit the dictation for accuracy, there may be errors in the transcription that are not intended.

## 2022-10-28 DIAGNOSIS — E78.5 HYPERLIPIDEMIA, UNSPECIFIED HYPERLIPIDEMIA TYPE: ICD-10-CM

## 2022-10-28 LAB
ALBUMIN SERPL-MCNC: 4.5 G/DL (ref 3.4–5)
ALP BLD-CCNC: 86 U/L (ref 40–129)
ALT SERPL-CCNC: 28 U/L (ref 10–40)
AST SERPL-CCNC: 25 U/L (ref 15–37)
BILIRUB SERPL-MCNC: 0.3 MG/DL (ref 0–1)
BILIRUBIN DIRECT: <0.2 MG/DL (ref 0–0.3)
BILIRUBIN, INDIRECT: NORMAL MG/DL (ref 0–1)
CHOLESTEROL, TOTAL: 204 MG/DL (ref 0–199)
HDLC SERPL-MCNC: 48 MG/DL (ref 40–60)
LDL CHOLESTEROL CALCULATED: 128 MG/DL
TOTAL PROTEIN: 6.8 G/DL (ref 6.4–8.2)
TRIGL SERPL-MCNC: 142 MG/DL (ref 0–150)
VLDLC SERPL CALC-MCNC: 28 MG/DL

## 2022-11-02 ENCOUNTER — HOSPITAL ENCOUNTER (OUTPATIENT)
Dept: PHYSICAL THERAPY | Age: 72
Setting detail: THERAPIES SERIES
Discharge: HOME | End: 2022-11-02
Payer: MEDICARE

## 2022-11-02 PROCEDURE — 97161 PT EVAL LOW COMPLEX 20 MIN: CPT | Performed by: PHYSICAL THERAPIST

## 2022-11-02 PROCEDURE — 97110 THERAPEUTIC EXERCISES: CPT | Performed by: PHYSICAL THERAPIST

## 2022-11-02 NOTE — FLOWSHEET NOTE
[facility],  [address] [place]  Phone: [phone]  Fax [phone]    Physical Therapy Treatment Note/ Progress Report:           Date:  2022    Patient Name:  Georgia Osborn    :  1950  MRN: [id number]  Restrictions/Precautions:    Medical/Treatment Diagnosis Information:  S/P total right hip arthroplasty [Z96.641]  R hip and knee pain M25.561, R12.892  Insurance/Certification information:  PT Insurance Information: human MC (cohere)  Physician Information:  Ra De La Rosa PA-C  Has the plan of care been signed (Y/N):        []  Yes  [x]  No     Date of Patient follow up with Physician: 23      Is this a Progress Report:     []  Yes  [x]  No        If Yes:  Date Range for reporting period:  Beginning 22  Ending    Progress report will be due (10 Rx or 30 days whichever is less): 06       Recertification will be due (POC Duration  / 90 days whichever is less): 22      Visit # Insurance Allowable Auth Required   In-person 1 [place], check auth [place]  [x]  Yes []  No    Telehealth   []  Yes []  No    Total          Functional Scale: FOTO hip 49/100    Date assessed:  22      Therapy Diagnosis/Practice Pattern:4H      Number of Comorbidities:  []0     []1-2    [x]3+    Latex Allergy:  [x]NO      []YES  Preferred Language for Healthcare:   [x]English       []other:      Pain level:  2/10 R hip and knee      SUBJECTIVE:  See eval    OBJECTIVE: See eval; sx date 10/3/22 (over 4 weeks post op)   Observation:   Test measurements:      RESTRICTIONS/PRECAUTIONS: ant THR     Exercises/Interventions:     Therapeutic Ex (25862) Sets/sec Reps Notes/CUES   Edu diagnosis, hip precautions, sleeping, current HEP  8 min   Hep    Sitting HS stretch  3x30\"  Hep    Side step at 1/2 wall  2 laps   Hep    Supine TrA with hip abd  BTB 5\" x10 2 sets   Hep    Supine TrA with add squeeze and bridge  5\" x10 2 sets Hep    Demonstrated/discussed scar massage  2 min                                          Manual Intervention (62082)      R hip PROM, HS stretch  6 min                                    NMR re-education (72506)   CUES NEEDED                                                         Therapeutic Activity (47398)                                                Therapeutic Exercise and NMR EXR  [x] (51991) Provided verbal/tactile cueing for activities related to strengthening, flexibility, endurance, ROM for improvements in LE, proximal hip, and core control with self care, mobility, lifting, ambulation.  [] (58901) Provided verbal/tactile cueing for activities related to improving balance, coordination, kinesthetic sense, posture, motor skill, proprioception  to assist with LE, proximal hip, and core control in self care, mobility, lifting, ambulation and eccentric single leg control.      NMR and Therapeutic Activities:    [] (92762 or 89177) Provided verbal/tactile cueing for activities related to improving balance, coordination, kinesthetic sense, posture, motor skill, proprioception and motor activation to allow for proper function of core, proximal hip and LE with self care and ADLs  [] (07660) Gait Re-education- Provided training and instruction to the patient for proper LE, core and proximal hip recruitment and positioning and eccentric body weight control with ambulation re-education including up and down stairs     Home Exercise Program:    [x] (64549) Reviewed/Progressed HEP activities related to strengthening, flexibility, endurance, ROM of core, proximal hip and LE for functional self-care, mobility, lifting and ambulation/stair navigation   [] (19382)Reviewed/Progressed HEP activities related to improving balance, coordination, kinesthetic sense, posture, motor skill, proprioception of core, proximal hip and LE for self care, mobility, lifting, and ambulation/stair navigation      Manual Treatments:  PROM / STM / Oscillations-Mobs:  G-I, II, III, IV (PA's, Inf., Post.)  [x] (80780) Provided manual therapy to mobilize LE, proximal hip and/or LS spine soft tissue/joints for the purpose of modulating pain, promoting relaxation,  increasing ROM, reducing/eliminating soft tissue swelling/inflammation/restriction, improving soft tissue extensibility and allowing for proper ROM for normal function with self care, mobility, lifting and ambulation. Modalities:     [] GAME READY (VASO)- for significant edema, swelling, pain control. Charges  Timed Code Treatment Minutes: 25   Total Treatment Minutes: 45     Medicare total (add KX at $2000)         BWC time in/ time out:    TE time in /out    Manual time in/out    Neuro re ed time in/out    Untimed minutes        [x] EVAL (LOW) 51119   [] EVAL (MOD) 64591  [] EVAL (HIGH) 85014   [] RE-EVAL     [x] DX(23823) x2     [] IONTO  [] NMR (33831) x     [] VASO  [] Manual (11534) x      [] Other:  [] TA x      [] Mech Traction (72841)  [] ES(attended) (96677)      [] ES (un) (74734):       GOALS:  Patient stated goal: get rid of R knee pain   [] Progressing: [] Met: [] Not Met: [] Adjusted    Therapist goals for Patient:   Short Term Goals: To be achieved in: 2 weeks  1. Independent in HEP and progression per patient tolerance, in order to prevent re-injury. [] Progressing: [] Met: [] Not Met: [] Adjusted  2. Patient will have a decrease in pain to facilitate improvement in movement, function, and ADLs as indicated by Functional Deficits. [] Progressing: [] Met: [] Not Met: [] Adjusted    Long Term Goals: To be achieved in: 6-8 weeks  1. Disability index score of 64% or more per FOTO to assist with reaching prior level of function. [] Progressing: [] Met: [] Not Met: [] Adjusted  2. Patient will demonstrate increased AROM to R knee flex to 120 and R hip ex to 0  to allow for proper joint functioning as indicated by patients Functional Deficits.    [] Progressing: [] Met: [] Not Met: [] Adjusted  3. Patient will demonstrate an increase in Strength to good proximal hip strength and control, within 5lb HHD in LE to allow for proper functional mobility as indicated by patients Functional Deficits. [] Progressing: [] Met: [] Not Met: [] Adjusted  4. Patient will return to being able to ambulate with proper gait without an AD, ascend descend steps in home with reciprocal gait holding onto 1 railing  functional activities without increased symptoms or restriction. [] Progressing: [] Met: [] Not Met: [] Adjusted  5. Patient able to return to motorcycle riding with    [] Progressing: [] Met: [] Not Met: [] Adjusted     Progression Towards Functional goals:  [] Patient is progressing as expected towards functional goals listed. [] Progression is slowed due to complexities listed. [] Progression has been slowed due to co-morbidities. [x] Plan just implemented, too soon to assess goals progression  [] Other:         Overall Progression Towards Functional goals/ Treatment Progress Update:  [] Patient is progressing as expected towards functional goals listed. [] Progression is slowed due to complexities/Impairments listed. [] Progression has been slowed due to co-morbidities.   [x] Plan just implemented, too soon to assess goals progression <30days   [] Goals require adjustment due to lack of progress  [] Patient is not progressing as expected and requires additional follow up with physician  [] Other    Prognosis for POC: [x] Good [] Fair  [] Poor      Patient requires continued skilled intervention: [x] Yes  [] No    Treatment/Activity Tolerance:  [x] Patient able to complete treatment  [] Patient limited by fatigue  [] Patient limited by pain    [] Patient limited by other medical complications  [] Other:     ASSESSMENT: see eval     Return to Play: (if applicable)   []  Stage 1: Intro to Strength   []  Stage 2: Return to Run and Strength   []  Stage 3: Return to Jump and Strength   []  Stage 4: Dynamic Strength and Agility   []  Stage 5: Sport Specific Training     []  Ready to Return to Play, Meets All Above Stages   []  Not Ready for Return to Sports   Comments:                         Access Code: [email or web address]  URL: [email or web address]  Date: 11/02/2022  Prepared by: Charlotte Whitlock    Exercises  Hooklying Clamshell with Resistance - 2 x daily - 7 x weekly - 2 sets - 10 reps - 5 hold  Supine Bridge with Mini Swiss Ball Between Knees - 2 x daily - 7 x weekly - 2 sets - 10 reps - 5 hold  Seated Table Hamstring Stretch - 2 x daily - 7 x weekly - 1 sets - 3 reps - 30 hold  Side Stepping with Counter Support - 2 x daily - 7 x weekly - 2 sets - 10 reps        PLAN: See eval  [] Continue per plan of care [] Alter current plan (see comments above)  [x] Plan of care initiated [] Hold pending MD visit [] Discharge      Electronically signed by:  Charlotte Whitlock, PT    Note: If patient does not return for scheduled/ recommended follow up visits, this note will serve as a discharge from care along with most recent update on progress.

## 2022-11-02 NOTE — PLAN OF CARE
[facility], [address] [place] [address]  Phone: [phone]  Fax [phone]     Physical Therapy Certification    Dear Scarlet LR  ,    We had the pleasure of evaluating the following patient for physical therapy services at [address] [place] [place] [address]. A summary of our findings can be found in the initial assessment below. This includes our plan of care. If you have any questions or concerns regarding these findings, please do not hesitate to contact me at the office phone number checked above. Thank you for the referral.       Physician Signature:_______________________________Date:__________________  By signing above (or electronic signature), therapists plan is approved by physician    Patient: Randy Pulido   : 1950   MRN: [id number]  Referring Physician: Ellen Garcia PA-C      Evaluation Date: 2022      Medical Diagnosis Information:  S/P total right hip arthroplasty [Z96.641]   R hip and knee pain M25.562, M25.551                                         Insurance information: PT Insurance Information: human MC ([id number])       Precautions/ Contra-indications: L hip replaced , B TKR      C-SSRS Triggered by Intake questionnaire (Past 2 wk assessment):   [x] No, Questionnaire did not trigger screening.   [] Yes, Patient intake triggered further evaluation      [] C-SSRS Screening completed  [] PCP notified via Plan of Care  [] Emergency services notified     Latex Allergy:  [x]NO      []YES  Preferred Language for Healthcare:   [x]English       []other:    SUBJECTIVE: Patient reports she had been having knee pain for the past couple years. She had a R ant THR on 10/3/2022. Pt reports she was using a walker but only for 1 week and then occ used a cane but has not been using for the past couple weeks.  Pt reports she has some soreness in her hip and lateral thigh feels numb but getting better. Pt reports her knee pain is less but still there (under the knee cap). Pt reports she is taking anti-inflammatory for pain     Relevant Medical History:OA, Hyperlipidemia, Lumbar DDD with previous steroid injections. L hip replaced 2013, B knee replacement 2008. Functional Disability Index: FOTO hip 49/100    Height 5 ft 3 inches  Weight 190 lbs   Pain Scale: 2/10  Easing factors: rest   Provocative factors: standing walking, stairs      Type: []Constant   [x]Intermittent  []Radiating []Localized []other:     Numbness/Tingling: R lateral thigh     Occupation/School:retired     Living Status/Prior Level of Function: Independent with ADLs and IADLs, lives with  ; 3 story home with bedroom and bathroom on 1st floor; 2 steps to get in; [employer] in basement; pt was able ride motorcycle but has not been able to ride for past 2 yrs secondary to pain     OBJECTIVE:     ROM LEFT RIGHT   HIP Flex  95   HIP Abd     HIP Ext  -5    HIP IR     HIP ER     Knee ext 0 -1   Knee Flex 120 114   Ankle PF     Ankle DF     Ankle In     Ankle Ev     Strength  LEFT RIGHT   HIP Flexors 4 4   HIP Abductors  3   HIP Ext     Hip ER     Knee EXT (quad) 4+ 4   Knee Flex (HS) 5 4+   Ankle DF 5 5   Ankle PF     Ankle Inv     Ankle EV          Circumference  Mid apex  7 cm prox             Reflexes/Sensation: NT    []Dermatomes/Myotomes intact    [x]Reflexes equal and normal bilaterally   []Other:    Joint mobility:    []Normal    [x]Hypo   []Hyper    Palpation: mild tenderness with palp to r glut med and ITB     Functional Mobility/Transfers: independent     Posture: IC R>L ; pes planus; genu recavatum     Bandages/Dressings/Incisions: healing incision, no signs of infection     Gait: WBAT without an AD; decreased stance time on R; decreased arm swing on R     Orthopedic Special Tests: NT secondary to post op                        [x] Patient history, allergies, meds reviewed. Medical chart reviewed. See intake form. Review Of Systems (ROS):  [x]Performed Review of systems (Integumentary, CardioPulmonary, Neurological) by intake and observation. Intake form has been scanned into medical record. Patient has been instructed to contact their primary care physician regarding ROS issues if not already being addressed at this time. Co-morbidities/Complexities (which will affect course of rehabilitation):   []None           Arthritic conditions   []Rheumatoid arthritis (M05.9)  [x]Osteoarthritis (M19.91)   Cardiovascular conditions   []Hypertension (I10)  []Hyperlipidemia (E78.5)  []Angina pectoris (I20)  []Atherosclerosis (I70)   Musculoskeletal conditions   [x]Disc pathology   []Congenital spine pathologies   []Prior surgical intervention  []Osteoporosis (M81.8)  []Osteopenia (M85.8)   Endocrine conditions   []Hypothyroid (E03.9)  []Hyperthyroid Gastrointestinal conditions   []Constipation (G41.80)   Metabolic conditions   []Morbid obesity (E66.01)  []Diabetes type 1(E10.65) or 2 (E11.65)   []Neuropathy (G60.9)     Pulmonary conditions   []Asthma (J45)  []Coughing   []COPD (J44.9)   Psychological Disorders  []Anxiety (F41.9)  []Depression (F32.9)   []Other:   [x]Other: B TKR, L THR          Barriers to/and or personal factors that will affect rehab potential:              []Age  []Sex              []Motivation/Lack of Motivation                        []Co-Morbidities              []Cognitive Function, education/learning barriers              []Environmental, home barriers              []profession/work barriers  []past PT/medical experience  []other:  Justification:     Falls Risk Assessment (30 days):   [x] Falls Risk assessed and no intervention required.   [] Falls Risk assessed and Patient requires intervention due to being higher risk   TUG score (>12s at risk):     [] Falls education provided, including           ASSESSMENT:   Functional Impairments:     [x]Noted lumbar/proximal hip/LE joint hypomobility   [x]Decreased LE functional ROM   [x]Decreased core/proximal hip strength and neuromuscular control   [x]Decreased LE functional strength   [x]Reduced balance/proprioceptive control   []other:      Functional Activity Limitations (from functional questionnaire and intake)   []Reduced ability to tolerate prolonged functional positions   []Reduced ability or difficulty with changes of positions or transfers between positions   []Reduced ability to maintain good posture and demonstrate good body mechanics with sitting, bending, and lifting   [x]Reduced ability to sleep   [] Reduced ability or tolerance with driving and/or computer work   []Reduced ability to perform lifting, carrying tasks   [x]Reduced ability to squat   []Reduced ability to forward bend   [x]Reduced ability to ambulate prolonged functional periods/distances/surfaces   [x]Reduced ability to ascend/descend stairs   []Reduced ability to run, hop, cut or jump   []other:    Participation Restrictions   [x]Reduced participation in self care activities   [x]Reduced participation in home management activities   []Reduced participation in work activities   [x]Reduced participation in social activities. [x]Reduced participation in sport/recreation activities. Classification :    [x]Signs/symptoms consistent with post-surgical status including decreased ROM, strength and function.    []Signs/symptoms consistent with joint sprain/strain   []Signs/symptoms consistent with patella-femoral syndrome   []Signs/symptoms consistent with knee OA/hip OA   []Signs/symptoms consistent with internal derangement of knee/Hip   []Signs/symptoms consistent with functional hip weakness/NMR control      []Signs/symptoms consistent with tendinitis/tendinosis    []signs/symptoms consistent with pathology which may benefit from Dry needling      []other:      Prognosis/Rehab Potential:      []Excellent   [x]Good    []Fair   []Poor    Tolerance of evaluation/treatment: []Excellent   [x]Good    []Fair   []Poor  Physical Therapy Evaluation Complexity Justification  [x] A history of present problem with:  [] no personal factors and/or comorbidities that impact the plan of care;  []1-2 personal factors and/or comorbidities that impact the plan of care  [x]3 personal factors and/or comorbidities that impact the plan of care  [x] An examination of body systems using standardized tests and measures addressing any of the following: body structures and functions (impairments), activity limitations, and/or participation restrictions;:  [x] a total of 1-2 or more elements   [] a total of 3 or more elements   [] a total of 4 or more elements   [x] A clinical presentation with:  [x] stable and/or uncomplicated characteristics   [] evolving clinical presentation with changing characteristics  [] unstable and unpredictable characteristics;   [x] Clinical decision making of [x] low, [] moderate, [] high complexity using standardized patient assessment instrument and/or measurable assessment of functional outcome. [x] EVAL (LOW) 32523 (typically 20 minutes face-to-face)  [] EVAL (MOD) 20532 (typically 30 minutes face-to-face)  [] EVAL (HIGH) 58828 (typically 45 minutes face-to-face)  [] RE-EVAL       PLAN:   Frequency/Duration:  1 days per week for 6-8 Weeks:  Interventions:  [x]  Therapeutic exercise including: strength training, ROM, for Lower extremity and core   [x]  NMR activation and proprioception for LE, Glutes and Core   [x]  Manual therapy as indicated for LE, Hip and spine to include: Dry Needling/IASTM, STM, PROM, Gr I-IV mobilizations, manipulation. [x] Modalities as needed that may include: thermal agents, E-stim, Biofeedback, US, iontophoresis as indicated  [x] Patient education on joint protection, postural re-education, activity modification, progression of HEP.     HEP instruction: (see flowsheet)    GOALS:  Patient stated goal: get rid of R knee pain   [] Progressing: [] Met: [] Not Met: [] Adjusted    Therapist goals for Patient:   Short Term Goals: To be achieved in: 2 weeks  1. Independent in HEP and progression per patient tolerance, in order to prevent re-injury. [] Progressing: [] Met: [] Not Met: [] Adjusted  2. Patient will have a decrease in pain to facilitate improvement in movement, function, and ADLs as indicated by Functional Deficits. [] Progressing: [] Met: [] Not Met: [] Adjusted    Long Term Goals: To be achieved in: 6-8 weeks  1. Disability index score of 64% or more per FOTO to assist with reaching prior level of function. [] Progressing: [] Met: [] Not Met: [] Adjusted  2. Patient will demonstrate increased AROM to R knee flex to 120 and R hip ex to 0  to allow for proper joint functioning as indicated by patients Functional Deficits. [] Progressing: [] Met: [] Not Met: [] Adjusted  3. Patient will demonstrate an increase in Strength to good proximal hip strength and control, within 5lb HHD in LE to allow for proper functional mobility as indicated by patients Functional Deficits. [] Progressing: [] Met: [] Not Met: [] Adjusted  4. Patient will return to being able to ambulate with proper gait without an AD, ascend descend steps in home with reciprocal gait holding onto 1 railing  functional activities without increased symptoms or restriction. [] Progressing: [] Met: [] Not Met: [] Adjusted  5.  Patient able to return to motorcycle riding with    [] Progressing: [] Met: [] Not Met: [] Adjusted     Electronically signed by:  Janell Atkinson PT

## 2022-11-07 ENCOUNTER — HOSPITAL ENCOUNTER (OUTPATIENT)
Dept: PHYSICAL THERAPY | Age: 72
Setting detail: THERAPIES SERIES
Discharge: HOME | End: 2022-11-07
Payer: MEDICARE

## 2022-11-07 ENCOUNTER — TELEPHONE (OUTPATIENT)
Dept: ORTHOPEDIC SURGERY | Age: 72
End: 2022-11-07

## 2022-11-07 PROCEDURE — 97110 THERAPEUTIC EXERCISES: CPT | Performed by: PHYSICAL THERAPIST

## 2022-11-07 PROCEDURE — 97112 NEUROMUSCULAR REEDUCATION: CPT | Performed by: PHYSICAL THERAPIST

## 2022-11-07 PROCEDURE — 97140 MANUAL THERAPY 1/> REGIONS: CPT | Performed by: PHYSICAL THERAPIST

## 2022-11-07 NOTE — FLOWSHEET NOTE
Notes/CUES   Edu diagnosis, hip precautions, sleeping, current HEP   Hep    Sitting HS stretch  3x30\"  Hep    Side step at 1/2 wall  2 laps  Ytb  Hep    Supine TrA with hip abd  WA  5\" x10 2 sets   Hep    Supine TrA with add squeeze and bridge  5\" x10 2 sets   Hep          Supine SB knees pulls  5\" x15      Standing hip abd  YTB 2x10 B      Bike       Supine SAQ   4# 5\" 2x10      Sl hip abd 2x10   +Hep    [facility] [place] machine TKE  60# 5\" x20      Manual Intervention (13793)      R hip PROM, HS stretch ; prone quad stretching  10 min                                    NMR re-education (35889)   CUES NEEDED   LSU and FSU  4\" x10 ea            Weight shifts on airex and circles  30 sec ea EO      Standing mini squat YTB hip abd  3\" 2x10                                    Therapeutic Activity (22822)                                                Therapeutic Exercise and NMR EXR  [x] (27633) Provided verbal/tactile cueing for activities related to strengthening, flexibility, endurance, ROM for improvements in LE, proximal hip, and core control with self care, mobility, lifting, ambulation.  [] (28229) Provided verbal/tactile cueing for activities related to improving balance, coordination, kinesthetic sense, posture, motor skill, proprioception  to assist with LE, proximal hip, and core control in self care, mobility, lifting, ambulation and eccentric single leg control.      NMR and Therapeutic Activities:    [x] (15320 or 70341) Provided verbal/tactile cueing for activities related to improving balance, coordination, kinesthetic sense, posture, motor skill, proprioception and motor activation to allow for proper function of core, proximal hip and LE with self care and ADLs  [] (01444) Gait Re-education- Provided training and instruction to the patient for proper LE, core and proximal hip recruitment and positioning and eccentric body weight control with ambulation re-education including up and down stairs     Home Exercise Program:    [x] (76324) Reviewed/Progressed HEP activities related to strengthening, flexibility, endurance, ROM of core, proximal hip and LE for functional self-care, mobility, lifting and ambulation/stair navigation   [] (30136)Reviewed/Progressed HEP activities related to improving balance, coordination, kinesthetic sense, posture, motor skill, proprioception of core, proximal hip and LE for self care, mobility, lifting, and ambulation/stair navigation      Manual Treatments:  PROM / STM / Oscillations-Mobs:  G-I, II, III, IV (PA's, Inf., Post.)  [x] (44814) Provided manual therapy to mobilize LE, proximal hip and/or LS spine soft tissue/joints for the purpose of modulating pain, promoting relaxation,  increasing ROM, reducing/eliminating soft tissue swelling/inflammation/restriction, improving soft tissue extensibility and allowing for proper ROM for normal function with self care, mobility, lifting and ambulation. Modalities:     [] GAME READY (VASO)- for significant edema, swelling, pain control. Charges  Timed Code Treatment Minutes: 55   Total Treatment Minutes: 55     Medicare total (add KX at $2000)         [facility] time in/ time out:    TE time in /out    Manual time in/out    Neuro re ed time in/out    Untimed minutes        [] EVAL (LOW) 11133   [] EVAL (MOD) 85553  [] EVAL (HIGH) 60594   [] RE-EVAL     [x] UD(32517) x2     [] IONTO  [x] NMR (41423) x 1    [] VASO  [x] Manual (75707) x 1     [] Other:  [] TA x      [] Mech Traction (17293)  [] ES(attended) (18340)      [] ES (un) (14662):       GOALS:  Patient stated goal: get rid of R knee pain   [] Progressing: [] Met: [] Not Met: [] Adjusted    Therapist goals for Patient:   Short Term Goals: To be achieved in: 2 weeks  1. Independent in HEP and progression per patient tolerance, in order to prevent re-injury. [] Progressing: [] Met: [] Not Met: [] Adjusted  2.  Patient will have a decrease in pain to facilitate improvement in movement, function, and ADLs as indicated by Functional Deficits. [] Progressing: [] Met: [] Not Met: [] Adjusted    Long Term Goals: To be achieved in: 6-8 weeks  1. Disability index score of 64% or more per FOTO to assist with reaching prior level of function. [] Progressing: [] Met: [] Not Met: [] Adjusted  2. Patient will demonstrate increased AROM to R knee flex to 120 and R hip ex to 0  to allow for proper joint functioning as indicated by patients Functional Deficits. [] Progressing: [] Met: [] Not Met: [] Adjusted  3. Patient will demonstrate an increase in Strength to good proximal hip strength and control, within 5lb HHD in LE to allow for proper functional mobility as indicated by patients Functional Deficits. [] Progressing: [] Met: [] Not Met: [] Adjusted  4. Patient will return to being able to ambulate with proper gait without an AD, ascend descend steps in home with reciprocal gait holding onto 1 railing  functional activities without increased symptoms or restriction. [] Progressing: [] Met: [] Not Met: [] Adjusted  5. Patient able to return to motorcycle riding with    [] Progressing: [] Met: [] Not Met: [] Adjusted     Progression Towards Functional goals:  [] Patient is progressing as expected towards functional goals listed. [] Progression is slowed due to complexities listed. [] Progression has been slowed due to co-morbidities. [x] Plan just implemented, too soon to assess goals progression  [] Other:         Overall Progression Towards Functional goals/ Treatment Progress Update:  [] Patient is progressing as expected towards functional goals listed. [] Progression is slowed due to complexities/Impairments listed. [] Progression has been slowed due to co-morbidities.   [x] Plan just implemented, too soon to assess goals progression <30days   [] Goals require adjustment due to lack of progress  [] Patient is not progressing as expected and requires additional follow up with physician  [] Other    Prognosis for POC: [x] Good [] Fair  [] Poor      Patient requires continued skilled intervention: [x] Yes  [] No    Treatment/Activity Tolerance:  [x] Patient able to complete treatment  [] Patient limited by fatigue  [] Patient limited by pain    [] Patient limited by other medical complications  [] Other:     ASSESSMENT: pt demonstrates understanding of HEP with min VC's. Pt has increased R pes planus  with FSU. Discussed OTC orthotics to try to help decrease medial R knee pain     Return to Play: (if applicable)   []  Stage 1: Intro to Strength   []  Stage 2: Return to Run and Strength   []  Stage 3: Return to Jump and Strength   []  Stage 4: Dynamic Strength and Agility   []  Stage 5: Sport Specific Training     []  Ready to Return to Play, Meets All Above Stages   []  Not Ready for Return to Sports   Comments:                         Access Code: [email or web address]  URL: [email or web address]  Date: 11/02/2022  Prepared by: Ozzy Herrera    Exercises  Hooklying Clamshell with Resistance - 2 x daily - 7 x weekly - 2 sets - 10 reps - 5 hold  Supine Bridge with Mini Swiss Ball Between Knees - 2 x daily - 7 x weekly - 2 sets - 10 reps - 5 hold  Seated Table Hamstring Stretch - 2 x daily - 7 x weekly - 1 sets - 3 reps - 30 hold  Side Stepping with Counter Support - 2 x daily - 7 x weekly - 2 sets - 10 reps  Access Code: [email or web address]  URL: [email or web address]  Date: 11/07/2022  Prepared by: Ozzy Herrera    Exercises  Sidelying Pelvic Floor Contraction with Hip Abduction - 2 x daily - 7 x weekly - 2 sets - 10 reps - 2 hold        PLAN: Pt to call MD regarding incision   [x] Continue per plan of care [] Alter current plan (see comments above)  [] Plan of care initiated [] Hold pending MD visit [] Discharge      Electronically signed by:  Ozzy Herrera PT    Note: If patient does not return for scheduled/ recommended follow up visits, this note will serve as a discharge from care along with most recent update on progress.

## 2022-11-14 ENCOUNTER — HOSPITAL ENCOUNTER (OUTPATIENT)
Dept: PHYSICAL THERAPY | Age: 72
Setting detail: THERAPIES SERIES
Discharge: HOME | End: 2022-11-14
Payer: MEDICARE

## 2022-11-14 PROCEDURE — 97110 THERAPEUTIC EXERCISES: CPT | Performed by: PHYSICAL THERAPIST

## 2022-11-14 PROCEDURE — 97112 NEUROMUSCULAR REEDUCATION: CPT | Performed by: PHYSICAL THERAPIST

## 2022-11-14 PROCEDURE — 97140 MANUAL THERAPY 1/> REGIONS: CPT | Performed by: PHYSICAL THERAPIST

## 2022-11-14 NOTE — FLOWSHEET NOTE
[facility],  [address] [place]  Phone: [phone]  Fax [phone]    Physical Therapy Treatment Note/ Progress Report:           Date:  2022    Patient Name:  Lucian Buitrago    :  1950  MRN: [id number]  Restrictions/Precautions:    Medical/Treatment Diagnosis Information:  S/P total right hip arthroplasty [Z96.641]  R hip and knee pain M25.561, T43.888  Insurance/Certification information:  PT Insurance Information: [employer] ([employer])  Physician Information:  Flaco Pride PA-C  Has the plan of care been signed (Y/N):        []  Yes  [x]  No     Date of Patient follow up with Physician: 23      Is this a Progress Report:     []  Yes  [x]  No        If Yes:  Date Range for reporting period:  Beginning 22  Ending    Progress report will be due (10 Rx or 30 days whichever is less):        Recertification will be due (POC Duration  / 90 days whichever is less): 22      Visit # Insurance Allowable Auth Required   In-person 3/8 8 thru 22 [x]  Yes []  No    Telehealth   []  Yes []  No    Total          Functional Scale: FOTO hip 49/100    Date assessed:  22      Therapy Diagnosis/Practice Pattern:4H      Number of Comorbidities:  []0     []1-2    [x]3+    Latex Allergy:  [x]NO      []YES  Preferred Language for Healthcare:   [x]English       []other:      Pain level:  0/10 R hip and 1-2/10 painknee      SUBJECTIVE:  pt reports her hip is fine. She is trying OTC orthotics today. Knee pretty good. Just some tightness. [facility]  She did taker her cane     OBJECTIVE:  sx date 10/3/22 ( 5.5 weeks post op)   Observation:    Test measurements:      RESTRICTIONS/PRECAUTIONS: ant THR ; pelvic fx in      Exercises/Interventions:     Therapeutic Ex (15123) Sets/sec Reps Notes/CUES   Prone quad stretch with belt  10\" x10 +hep   Sitting HS stretch   Hep    Side step at 1/2 wall  Ytb  Hep    Supine TrA with hip abd with bridge  IN  5\" x10 2 sets   Hep    Supine TrA with add squeeze and bridge and SB  5\" x10 2 sets   Hep          Supine SB knees pulls        Standing hip abd  YTB 2x10 B      Bike  6 min   Resistance 2    Supine SAQ       Sl hip abd 2x10   +Hep    [facility] machine TKE  70# 5\" x20      Manual Intervention (94095)      R hip PROM, HS stretch ; prone quad stretching  10 min                                    NMR re-education (68660)   CUES NEEDED   LSU and FSU  4\" x15 ea            Balance on dyno discs  30 sec ea EO and then EC      Standing mini squat YTB hip abd  5\" 2x10                                    Therapeutic Activity (23043)                                                Therapeutic Exercise and NMR EXR  [x] (73100) Provided verbal/tactile cueing for activities related to strengthening, flexibility, endurance, ROM for improvements in LE, proximal hip, and core control with self care, mobility, lifting, ambulation.  [] (43909) Provided verbal/tactile cueing for activities related to improving balance, coordination, kinesthetic sense, posture, motor skill, proprioception  to assist with LE, proximal hip, and core control in self care, mobility, lifting, ambulation and eccentric single leg control.      NMR and Therapeutic Activities:    [x] (04273 or 16033) Provided verbal/tactile cueing for activities related to improving balance, coordination, kinesthetic sense, posture, motor skill, proprioception and motor activation to allow for proper function of core, proximal hip and LE with self care and ADLs  [] (06150) Gait Re-education- Provided training and instruction to the patient for proper LE, core and proximal hip recruitment and positioning and eccentric body weight control with ambulation re-education including up and down stairs     Home Exercise Program:    [x] (39022) Reviewed/Progressed HEP activities related to strengthening, flexibility, endurance, ROM of core, proximal hip and LE for functional self-care, mobility, lifting and ambulation/stair navigation   [] (06629)Reviewed/Progressed HEP activities related to improving balance, coordination, kinesthetic sense, posture, motor skill, proprioception of core, proximal hip and LE for self care, mobility, lifting, and ambulation/stair navigation      Manual Treatments:  PROM / STM / Oscillations-Mobs:  G-I, II, III, IV (PA's, Inf., Post.)  [x] (95553) Provided manual therapy to mobilize LE, proximal hip and/or LS spine soft tissue/joints for the purpose of modulating pain, promoting relaxation,  increasing ROM, reducing/eliminating soft tissue swelling/inflammation/restriction, improving soft tissue extensibility and allowing for proper ROM for normal function with self care, mobility, lifting and ambulation. Modalities:     [] GAME READY (VASO)- for significant edema, swelling, pain control. Charges  Timed Code Treatment Minutes: 45   Total Treatment Minutes: 45     Medicare total (add KX at $2000)         [facility] time in/ time out:    TE time in /out    Manual time in/out    Neuro re ed time in/out    Untimed minutes        [] EVAL (LOW) 84671   [] EVAL (MOD) 38007  [] EVAL (HIGH) 31302   [] RE-EVAL     [x] XM(65943) x1     [] IONTO  [x] NMR (63093) x 1    [] VASO  [x] Manual (71064) x 1     [] Other:  [] TA x      [] Mech Traction (23357)  [] ES(attended) (55010)      [] ES (un) (49996):       GOALS:  Patient stated goal: get rid of R knee pain   [] Progressing: [] Met: [] Not Met: [] Adjusted    Therapist goals for Patient:   Short Term Goals: To be achieved in: 2 weeks  1. Independent in HEP and progression per patient tolerance, in order to prevent re-injury. [x] Progressing: [] Met: [] Not Met: [] Adjusted  2. Patient will have a decrease in pain to facilitate improvement in movement, function, and ADLs as indicated by Functional Deficits.   [x] Progressing: [] Met: [] Not Met: [] Adjusted    Long Term Goals: To be achieved in: 6-8 weeks  1. Disability index score of 64% or more per FOTO to assist with reaching prior level of function. [] Progressing: [] Met: [] Not Met: [] Adjusted  2. Patient will demonstrate increased AROM to R knee flex to 120 and R hip ex to 0  to allow for proper joint functioning as indicated by patients Functional Deficits. [] Progressing: [] Met: [] Not Met: [] Adjusted  3. Patient will demonstrate an increase in Strength to good proximal hip strength and control, within 5lb HHD in LE to allow for proper functional mobility as indicated by patients Functional Deficits. [] Progressing: [] Met: [] Not Met: [] Adjusted  4. Patient will return to being able to ambulate with proper gait without an AD, ascend descend steps in home with reciprocal gait holding onto 1 railing  functional activities without increased symptoms or restriction. [] Progressing: [] Met: [] Not Met: [] Adjusted  5. Patient able to return to motorcycle riding with    [] Progressing: [] Met: [] Not Met: [] Adjusted     Progression Towards Functional goals:  [x] Patient is progressing as expected towards functional goals listed. [] Progression is slowed due to complexities listed. [] Progression has been slowed due to co-morbidities. [] Plan just implemented, too soon to assess goals progression  [] Other:         Overall Progression Towards Functional goals/ Treatment Progress Update:  [] Patient is progressing as expected towards functional goals listed. [] Progression is slowed due to complexities/Impairments listed. [] Progression has been slowed due to co-morbidities.   [x] Plan just implemented, too soon to assess goals progression <30days   [] Goals require adjustment due to lack of progress  [] Patient is not progressing as expected and requires additional follow up with physician  [] Other    Prognosis for POC: [x] Good [] Fair  [] Poor      Patient requires continued skilled intervention: [x] Yes  [] No    Treatment/Activity Tolerance:  [x] Patient able to complete treatment  [] Patient limited by fatigue  [] Patient limited by pain    [] Patient limited by other medical complications  [] Other:     ASSESSMENT: mild antalgesic gait at start of PT but improved by end of session     Return to Play: (if applicable)   []  Stage 1: Intro to Strength   []  Stage 2: Return to Run and Strength   []  Stage 3: Return to Jump and Strength   []  Stage 4: Dynamic Strength and Agility   []  Stage 5: Sport Specific Training     []  Ready to Return to Play, Meets All Above Stages   []  Not Ready for Return to Sports   Comments:                         Access Code: [email or web address]  URL: [email or web address]  Date: 11/02/2022  Prepared by: Albert Lindquist    Exercises  Hooklying Clamshell with Resistance - 2 x daily - 7 x weekly - 2 sets - 10 reps - 5 hold  Supine Bridge with Mini Swiss Ball Between Knees - 2 x daily - 7 x weekly - 2 sets - 10 reps - 5 hold  Seated Table Hamstring Stretch - 2 x daily - 7 x weekly - 1 sets - 3 reps - 30 hold  Side Stepping with Counter Support - 2 x daily - 7 x weekly - 2 sets - 10 reps  Access Code: [email or web address]  URL: [employer]/  Date: 11/07/2022  Prepared by: Albert Lindquist    Exercises  Sidelying Pelvic Floor Contraction with Hip Abduction - 2 x daily - 7 x weekly - 2 sets - 10 reps - 2 hold  Access Code: [email or web address]  URL: Earlier Media/  Date: 11/14/2022  Prepared by: Albert Lindquist    Exercises  Prone Quadriceps Stretch with Strap - 2 x daily - 7 x weekly - 1 sets - 10 reps - 10 hold        PLAN:  Progress hip and core strengthening exercises and balance exercises   [x] Continue per plan of care [] Alter current plan (see comments above)  [] Plan of care initiated [] Hold pending MD visit [] Discharge      Electronically signed by:  Albert Lindquist PT    Note: If patient does not return for scheduled/ recommended follow up visits, this note will serve as a discharge from care along with most recent update on progress.

## 2022-11-21 ENCOUNTER — HOSPITAL ENCOUNTER (OUTPATIENT)
Dept: PHYSICAL THERAPY | Age: 72
Setting detail: THERAPIES SERIES
Discharge: HOME | End: 2022-11-21
Payer: MEDICARE

## 2022-11-21 PROCEDURE — 97112 NEUROMUSCULAR REEDUCATION: CPT | Performed by: PHYSICAL THERAPIST

## 2022-11-21 PROCEDURE — 97110 THERAPEUTIC EXERCISES: CPT | Performed by: PHYSICAL THERAPIST

## 2022-11-21 PROCEDURE — 97140 MANUAL THERAPY 1/> REGIONS: CPT | Performed by: PHYSICAL THERAPIST

## 2022-11-21 NOTE — FLOWSHEET NOTE
[facility],  [address] [place]  Phone: [phone]  Fax [phone]    Physical Therapy Treatment Note/ Progress Report:           Date:  2022    Patient Name:  Nel Stiles    :  1950  MRN: [id number]  Restrictions/Precautions:    Medical/Treatment Diagnosis Information:  S/P total right hip arthroplasty [Z96.641]  R hip and knee pain M25.561, N66.485  Insurance/Certification information:  PT Insurance Information: human MC ([employer])  Physician Information:  Judge Joaquin PA-C  Has the plan of care been signed (Y/N):        []  Yes  [x]  No     Date of Patient follow up with Physician: 23      Is this a Progress Report:     []  Yes  [x]  No        If Yes:  Date Range for reporting period:  Beginning 22  Ending    Progress report will be due (10 Rx or 30 days whichever is less): 98       Recertification will be due (POC Duration  / 90 days whichever is less): 22      Visit # Insurance Allowable Auth Required   In-person  thru 22 [x]  Yes []  No    Telehealth   []  Yes []  No    Total          Functional Scale: FOTO hip 49/100    Date assessed:  22      Therapy Diagnosis/Practice Pattern:4H      Number of Comorbidities:  []0     []1-2    [x]3+    Latex Allergy:  [x]NO      []YES  Preferred Language for Healthcare:   [x]English       []other:      Pain level:  0/10 R hip and 1-2/10 painknee      SUBJECTIVE:  pt reports some soreness in her hip. Her knee will hurt when she first gets up but then gets better.      OBJECTIVE:  sx date 10/3/22 ( 6.5 weeks post op)   Observation:    Test measurements:      RESTRICTIONS/PRECAUTIONS: ant THR ; pelvic fx in      Exercises/Interventions:     Therapeutic Ex (11593) Sets/sec Reps Notes/CUES   Prone quad stretch with belt  10\" x10   +hep   Sitting HS stretch   Hep    Side step at 1/2 wall  Ytb  Hep ; +YTB    Supine TrA with hip abd with bridge  GA  5\" x10 2 sets   Hep    Supine TrA with add squeeze and bridge and SB  5\" x10 2 sets   Hep       + hep         Standing hip abd        Bike  8 min   Resistance 2    Supine SAQ       Sl hip abd 2x10   +Hep    [facility] [place] machine hip abd   30# 2x10 B      Manual Intervention (40253)      R hip PROM, HS stretch ; prone quad stretching ; rolling to ITB and psoas  12 min                                    NMR re-education (40695)   CUES NEEDED   LSU and FSU  At steps 2x10 ea            Balance on dyno discs  30 sec ea EO and then EC      Wall mini squat YTB hip abd  5\" 2x10      SLS  5\"2x10                              Therapeutic Activity (57122)                                                Therapeutic Exercise and NMR EXR  [x] (12645) Provided verbal/tactile cueing for activities related to strengthening, flexibility, endurance, ROM for improvements in LE, proximal hip, and core control with self care, mobility, lifting, ambulation.  [] (65972) Provided verbal/tactile cueing for activities related to improving balance, coordination, kinesthetic sense, posture, motor skill, proprioception  to assist with LE, proximal hip, and core control in self care, mobility, lifting, ambulation and eccentric single leg control.      NMR and Therapeutic Activities:    [x] (25667 or 59252) Provided verbal/tactile cueing for activities related to improving balance, coordination, kinesthetic sense, posture, motor skill, proprioception and motor activation to allow for proper function of core, proximal hip and LE with self care and ADLs  [] (39794) Gait Re-education- Provided training and instruction to the patient for proper LE, core and proximal hip recruitment and positioning and eccentric body weight control with ambulation re-education including up and down stairs     Home Exercise Program:    [x] (02015) Reviewed/Progressed HEP activities related to strengthening, flexibility, endurance, ROM of core, proximal hip and LE for functional self-care, mobility, lifting and ambulation/stair navigation   [] (50762)Reviewed/Progressed HEP activities related to improving balance, coordination, kinesthetic sense, posture, motor skill, proprioception of core, proximal hip and LE for self care, mobility, lifting, and ambulation/stair navigation      Manual Treatments:  PROM / STM / Oscillations-Mobs:  G-I, II, III, IV (PA's, Inf., Post.)  [x] (22456) Provided manual therapy to mobilize LE, proximal hip and/or LS spine soft tissue/joints for the purpose of modulating pain, promoting relaxation,  increasing ROM, reducing/eliminating soft tissue swelling/inflammation/restriction, improving soft tissue extensibility and allowing for proper ROM for normal function with self care, mobility, lifting and ambulation. Modalities:     [] GAME READY (VASO)- for significant edema, swelling, pain control. Charges  Timed Code Treatment Minutes: 45   Total Treatment Minutes: 45     Medicare total (add KX at $2000)         BWC time in/ time out:    TE time in /out    Manual time in/out    Neuro re ed time in/out    Untimed minutes        [] EVAL (LOW) 39336   [] EVAL (MOD) 63887  [] EVAL (HIGH) 48038   [] RE-EVAL     [x] YD(84101) x1     [] IONTO  [x] NMR (27901) x 1    [] VASO  [x] Manual (44169) x 1     [] Other:  [] TA x      [] Mech Traction (81832)  [] ES(attended) (87802)      [] ES (un) (60715):       GOALS:  Patient stated goal: get rid of R knee pain   [] Progressing: [] Met: [] Not Met: [] Adjusted    Therapist goals for Patient:   Short Term Goals: To be achieved in: 2 weeks  1. Independent in HEP and progression per patient tolerance, in order to prevent re-injury. [x] Progressing: [] Met: [] Not Met: [] Adjusted  2. Patient will have a decrease in pain to facilitate improvement in movement, function, and ADLs as indicated by Functional Deficits. [x] Progressing: [] Met: [] Not Met: [] Adjusted    Long Term Goals:  To be achieved in: 6-8 weeks  1. Disability index score of 64% or more per FOTO to assist with reaching prior level of function. [] Progressing: [] Met: [] Not Met: [] Adjusted  2. Patient will demonstrate increased AROM to R knee flex to 120 and R hip ex to 0  to allow for proper joint functioning as indicated by patients Functional Deficits. [] Progressing: [] Met: [] Not Met: [] Adjusted  3. Patient will demonstrate an increase in Strength to good proximal hip strength and control, within 5lb HHD in LE to allow for proper functional mobility as indicated by patients Functional Deficits. [] Progressing: [] Met: [] Not Met: [] Adjusted  4. Patient will return to being able to ambulate with proper gait without an AD, ascend descend steps in home with reciprocal gait holding onto 1 railing  functional activities without increased symptoms or restriction. [] Progressing: [] Met: [] Not Met: [] Adjusted  5. Patient able to return to motorcycle riding with    [] Progressing: [] Met: [] Not Met: [] Adjusted     Progression Towards Functional goals:  [x] Patient is progressing as expected towards functional goals listed. [] Progression is slowed due to complexities listed. [] Progression has been slowed due to co-morbidities. [] Plan just implemented, too soon to assess goals progression  [] Other:         Overall Progression Towards Functional goals/ Treatment Progress Update:  [] Patient is progressing as expected towards functional goals listed. [] Progression is slowed due to complexities/Impairments listed. [] Progression has been slowed due to co-morbidities.   [x] Plan just implemented, too soon to assess goals progression <30days   [] Goals require adjustment due to lack of progress  [] Patient is not progressing as expected and requires additional follow up with physician  [] Other    Prognosis for POC: [x] Good [] Fair  [] Poor      Patient requires continued skilled intervention: [x] Yes  [] MD visit [] Discharge      Electronically signed by:  Chris Conrad PT    Note: If patient does not return for scheduled/ recommended follow up visits, this note will serve as a discharge from care along with most recent update on progress.

## 2022-11-28 ENCOUNTER — HOSPITAL ENCOUNTER (OUTPATIENT)
Dept: PHYSICAL THERAPY | Age: 72
Setting detail: THERAPIES SERIES
Discharge: HOME | End: 2022-11-28
Payer: MEDICARE

## 2022-11-28 PROCEDURE — 97112 NEUROMUSCULAR REEDUCATION: CPT | Performed by: PHYSICAL THERAPIST

## 2022-11-28 PROCEDURE — 97110 THERAPEUTIC EXERCISES: CPT | Performed by: PHYSICAL THERAPIST

## 2022-11-28 PROCEDURE — 97140 MANUAL THERAPY 1/> REGIONS: CPT | Performed by: PHYSICAL THERAPIST

## 2022-11-28 NOTE — FLOWSHEET NOTE
[facility],  [address]  Phone: [phone]  Fax [phone]    Physical Therapy Treatment Note/ Progress Report:           Date:  2022    Patient Name:  Jason Hartmann    :  1950  MRN: [id number]  Restrictions/Precautions:    Medical/Treatment Diagnosis Information:  S/P total right hip arthroplasty [Z96.641]  R hip and knee pain M25.561, I64.438  Insurance/Certification information:  PT Insurance Information: human MC ([employer])  Physician Information:  Santos Woodson PA-C  Has the plan of care been signed (Y/N):        []  Yes  [x]  No     Date of Patient follow up with Physician: 23      Is this a Progress Report:     []  Yes  [x]  No        If Yes:  Date Range for reporting period:  Beginning 22  Ending    Progress report will be due (10 Rx or 30 days whichever is less): 49       Recertification will be due (POC Duration  / 90 days whichever is less): 22      Visit # Insurance Allowable Auth Required   In-person  thru 22 [x]  Yes []  No    Telehealth   []  Yes []  No    Total          Functional Scale: FOTO hip 49/100    Date assessed:  22      Therapy Diagnosis/Practice Pattern:4H      Number of Comorbidities:  []0     []1-2    [x]3+    Latex Allergy:  [x]NO      []YES  Preferred Language for Healthcare:   [x]English       []other:      Pain level:  0/10 R hip and 1-2/10 painknee      SUBJECTIVE:   pt reports her hip has been good. She did some standing with cooking with thanksgiving and felt fine. Pt reports knee pain about the same .  Pt reports she is ridingher bike now for 20 min     OBJECTIVE:  sx date 10/3/22 ( 7.5 weeks post op)   Observation:    Test measurements:      RESTRICTIONS/PRECAUTIONS: ant THR ; pelvic fx in      Exercises/Interventions:     Therapeutic Ex (75035) Sets/sec Reps Notes/CUES   Prone quad stretch with belt  10\" x10   +hep Side step at 1/2 wall  Ytb  Hep ; +YTB    Supine TrA with hip abd with bridge SL  BTB 5\" x10 B   Hep    Supine TrA with add squeeze and bridge and SB  5\" x20    Hep    Supine table tops heel taps  X10           Standing hip abd        Bike  10 min   Resistance 2    Supine SAQ       Sl hip abd 2x10   +Hep    [facility] [place] machine hip abd   45# 2x10 B      Manual Intervention (25516)      R hip PROM, HS stretch ; prone quad stretching ; rolling to ITB, psoas , quad  12 min                                    NMR re-education (61945)   CUES NEEDED   LSU and FSU  At steps 2x10 ea            Balance on dyno discs squats  5\" 2x10     Wall mini squat YTB hip abd      SLS airex  5\"2x10      Post disc slide  2x10                        Therapeutic Activity (66299)                                                Therapeutic Exercise and NMR EXR  [x] (58151) Provided verbal/tactile cueing for activities related to strengthening, flexibility, endurance, ROM for improvements in LE, proximal hip, and core control with self care, mobility, lifting, ambulation.  [] (10471) Provided verbal/tactile cueing for activities related to improving balance, coordination, kinesthetic sense, posture, motor skill, proprioception  to assist with LE, proximal hip, and core control in self care, mobility, lifting, ambulation and eccentric single leg control.      NMR and Therapeutic Activities:    [x] (24735 or 80781) Provided verbal/tactile cueing for activities related to improving balance, coordination, kinesthetic sense, posture, motor skill, proprioception and motor activation to allow for proper function of core, proximal hip and LE with self care and ADLs  [] (78050) Gait Re-education- Provided training and instruction to the patient for proper LE, core and proximal hip recruitment and positioning and eccentric body weight control with ambulation re-education including up and down stairs     Home Exercise Program:    [x] (30069) Reviewed/Progressed HEP activities related to strengthening, flexibility, endurance, ROM of core, proximal hip and LE for functional self-care, mobility, lifting and ambulation/stair navigation   [] (13584)Reviewed/Progressed HEP activities related to improving balance, coordination, kinesthetic sense, posture, motor skill, proprioception of core, proximal hip and LE for self care, mobility, lifting, and ambulation/stair navigation      Manual Treatments:  PROM / STM / Oscillations-Mobs:  G-I, II, III, IV (PA's, Inf., Post.)  [x] (91558) Provided manual therapy to mobilize LE, proximal hip and/or LS spine soft tissue/joints for the purpose of modulating pain, promoting relaxation,  increasing ROM, reducing/eliminating soft tissue swelling/inflammation/restriction, improving soft tissue extensibility and allowing for proper ROM for normal function with self care, mobility, lifting and ambulation. Modalities:     [] GAME READY (VASO)- for significant edema, swelling, pain control. Charges  Timed Code Treatment Minutes: 45   Total Treatment Minutes: 45     Medicare total (add KX at $2000)         BWC time in/ time out:    TE time in /out    Manual time in/out    Neuro re ed time in/out    Untimed minutes        [] EVAL (LOW) 63843   [] EVAL (MOD) 50219  [] EVAL (HIGH) 14690   [] RE-EVAL     [x] ZV(08953) x1     [] IONTO  [x] NMR (10878) x 1    [] VASO  [x] Manual (17360) x 1     [] Other:  [] TA x      [] Mech Traction (40406)  [] ES(attended) (41757)      [] ES (un) (35657):       GOALS:  Patient stated goal: get rid of R knee pain   [] Progressing: [] Met: [] Not Met: [] Adjusted    Therapist goals for Patient:   Short Term Goals: To be achieved in: 2 weeks  1. Independent in HEP and progression per patient tolerance, in order to prevent re-injury. [x] Progressing: [] Met: [] Not Met: [] Adjusted  2.  Patient will have a decrease in pain to facilitate improvement in movement, function, and ADLs as indicated by Functional Deficits. [x] Progressing: [] Met: [] Not Met: [] Adjusted    Long Term Goals: To be achieved in: 6-8 weeks  1. Disability index score of 64% or more per FOTO to assist with reaching prior level of function. [] Progressing: [] Met: [] Not Met: [] Adjusted  2. Patient will demonstrate increased AROM to R knee flex to 120 and R hip ex to 0  to allow for proper joint functioning as indicated by patients Functional Deficits. [] Progressing: [] Met: [] Not Met: [] Adjusted  3. Patient will demonstrate an increase in Strength to good proximal hip strength and control, within 5lb HHD in LE to allow for proper functional mobility as indicated by patients Functional Deficits. [] Progressing: [] Met: [] Not Met: [] Adjusted  4. Patient will return to being able to ambulate with proper gait without an AD, ascend descend steps in home with reciprocal gait holding onto 1 railing  functional activities without increased symptoms or restriction. [x] Progressing: [] Met: [] Not Met: [] Adjusted  5. Patient able to return to motorcycle riding with    [] Progressing: [] Met: [] Not Met: [] Adjusted     Progression Towards Functional goals:  [x] Patient is progressing as expected towards functional goals listed. [] Progression is slowed due to complexities listed. [] Progression has been slowed due to co-morbidities. [] Plan just implemented, too soon to assess goals progression  [] Other:         Overall Progression Towards Functional goals/ Treatment Progress Update:  [x] Patient is progressing as expected towards functional goals listed. [] Progression is slowed due to complexities/Impairments listed. [] Progression has been slowed due to co-morbidities.   [] Plan just implemented, too soon to assess goals progression <30days   [] Goals require adjustment due to lack of progress  [] Patient is not progressing as expected and requires additional follow up with physician  [] Other    Prognosis for POC: [x] Good [] Fair  [] Poor      Patient requires continued skilled intervention: [x] Yes  [] No    Treatment/Activity Tolerance:  [x] Patient able to complete treatment  [] Patient limited by fatigue  [] Patient limited by pain    [] Patient limited by other medical complications  [] Other:     ASSESSMENT:  still tight R quad and weak lower abdominal strength     Return to Play: (if applicable)   []  Stage 1: Intro to Strength   []  Stage 2: Return to Run and Strength   []  Stage 3: Return to Jump and Strength   []  Stage 4: Dynamic Strength and Agility   []  Stage 5: Sport Specific Training     []  Ready to Return to Play, Meets All Above Stages   []  Not Ready for Return to Sports   Comments:                         Access Code: [email or web address]  URL: [email or web address]  Date: 11/02/2022  Prepared by: Blas Reins    Exercises  Hooklying Clamshell with Resistance - 2 x daily - 7 x weekly - 2 sets - 10 reps - 5 hold  Supine Bridge with Mini Swiss Ball Between Knees - 2 x daily - 7 x weekly - 2 sets - 10 reps - 5 hold  Seated Table Hamstring Stretch - 2 x daily - 7 x weekly - 1 sets - 3 reps - 30 hold  Side Stepping with Counter Support - 2 x daily - 7 x weekly - 2 sets - 10 reps  Access Code: [email or web address]  URL: [employer]/  Date: 11/07/2022  Prepared by: Blas Reins    Exercises  Sidelying Pelvic Floor Contraction with Hip Abduction - 2 x daily - 7 x weekly - 2 sets - 10 reps - 2 hold  Access Code: [email or web address]  URL: [employer]/  Date: 11/14/2022  Prepared by: Blas Reins    Exercises  Prone Quadriceps Stretch with Strap - 2 x daily - 7 x weekly - 1 sets - 10 reps - 10 hold  Access Code: [email or web address]  URL: [employer]/  Date: 11/21/2022  Prepared by: Blas Reins    Exercises  Side Stepping with Resistance at Thighs and Counter Support - 2 x daily - 7 x weekly - 2 sets - 10 reps        PLAN:  Progress hip and core strengthening exercises and balance exercises; pt to bring  NV to learn passive stretches and reassess NV    [x] Continue per plan of care [] Alter current plan (see comments above)  [] Plan of care initiated [] Hold pending MD visit [] Discharge      Electronically signed by:  Janell Atkinson PT    Note: If patient does not return for scheduled/ recommended follow up visits, this note will serve as a discharge from care along with most recent update on progress.

## 2022-12-05 ENCOUNTER — HOSPITAL ENCOUNTER (OUTPATIENT)
Dept: PHYSICAL THERAPY | Age: 72
Setting detail: THERAPIES SERIES
Discharge: HOME | End: 2022-12-05
Payer: MEDICARE

## 2022-12-05 PROCEDURE — 97140 MANUAL THERAPY 1/> REGIONS: CPT | Performed by: PHYSICAL THERAPIST

## 2022-12-05 PROCEDURE — 97112 NEUROMUSCULAR REEDUCATION: CPT | Performed by: PHYSICAL THERAPIST

## 2022-12-05 PROCEDURE — 97110 THERAPEUTIC EXERCISES: CPT | Performed by: PHYSICAL THERAPIST

## 2022-12-05 NOTE — PROGRESS NOTES
[facility] [facility],  [address] [place]  Phone: [phone]  Fax [phone]    Physical Therapy Treatment Note/ Progress Report:           Date:  2022    Patient Name:  Nel Stiles    :  1950  MRN: [id number]  Restrictions/Precautions:    Medical/Treatment Diagnosis Information:  S/P total right hip arthroplasty [Z96.641]  R hip and knee pain M25.561, Z28.731  Insurance/Certification information:  PT Insurance Information: human [employer] ([employer])  Physician Information:  Judge Joaquin PA-C  Has the plan of care been signed (Y/N):        []  Yes  [x]  No     Date of Patient follow up with Physician: 23      Is this a Progress Report:     [x]  Yes  []  No        If Yes:  Date Range for reporting period:  Beginning 22  Ending 22    Progress report will be due (10 Rx or 30 days whichever is less):        Recertification will be due (POC Duration  / 90 days whichever is less): 22      Visit # Insurance Allowable Auth Required   In-person  8 thru 22 [x]  Yes []  No    Telehealth   []  Yes []  No    Total          Functional Scale: FOTO hip 49/100; 61/100    Date assessed:  22,       Therapy Diagnosis/Practice Pattern:4H      Number of Comorbidities:  []0     []1-2    [x]3+    Latex Allergy:  [x]NO      []YES  Preferred Language for Healthcare:   [x]English       []other:      Pain level:  0/10 R hip and 1/10 painknee      SUBJECTIVE:   pt reports busy weekend not as much stretching as should do. Pt reports up and down steps with reciprocal gait with 2 railings.       OBJECTIVE:  sx date 10/3/22 ( 8 weeks post op)   Observation:  demonstrated and practiced with  ITB and prone quad stretching   Test measurements:  R hip flex MMT 4/5, knee ext 4+, Knee flex 5/5; hip abd    , r knee flex 122     RESTRICTIONS/PRECAUTIONS: ant THR ; pelvic fx in      Exercises/Interventions: Therapeutic Ex (04919) Sets/sec Reps Notes/CUES   Prone quad stretch with belt  10\" x10   +hep        Side step at 1/2 wall  Ytb  Hep ; +YTB    Supine TrA with hip abd with bridge SL  BTB 5\" x10 B   Hep    Supine TrA with add squeeze and bridge and SB  5\" x20    Hep    Supine table tops heel taps  X10      Standing psoas stretch in cage  5\" x10         Bike  10 min   Resistance 2         Sl hip abd 2x10   +Hep    [facility] [place] machine hip abd   60# 2x10 B      Manual Intervention (08566)      R hip PROM, HS stretch ; prone quad stretching ; rolling to ITB, psoas , quad ; hawks  to R ITB and lateral quad 18 min                                    NMR re-education (32848)   CUES NEEDED   Post touch back  6\" 2x10             Balance on dyno discs squats  5\" 2x10          SLS airex with step to  5\"2x10      Post disc slide with RTB  2x10                        Therapeutic Activity (56863)                                                Therapeutic Exercise and NMR EXR  [x] (81017) Provided verbal/tactile cueing for activities related to strengthening, flexibility, endurance, ROM for improvements in LE, proximal hip, and core control with self care, mobility, lifting, ambulation.  [] (31800) Provided verbal/tactile cueing for activities related to improving balance, coordination, kinesthetic sense, posture, motor skill, proprioception  to assist with LE, proximal hip, and core control in self care, mobility, lifting, ambulation and eccentric single leg control.      NMR and Therapeutic Activities:    [x] (75182 or 66011) Provided verbal/tactile cueing for activities related to improving balance, coordination, kinesthetic sense, posture, motor skill, proprioception and motor activation to allow for proper function of core, proximal hip and LE with self care and ADLs  [] (16485) Gait Re-education- Provided training and instruction to the patient for proper LE, core and proximal hip recruitment and positioning and eccentric body weight control with ambulation re-education including up and down stairs     Home Exercise Program:    [x] (44321) Reviewed/Progressed HEP activities related to strengthening, flexibility, endurance, ROM of core, proximal hip and LE for functional self-care, mobility, lifting and ambulation/stair navigation   [] (29474)Reviewed/Progressed HEP activities related to improving balance, coordination, kinesthetic sense, posture, motor skill, proprioception of core, proximal hip and LE for self care, mobility, lifting, and ambulation/stair navigation      Manual Treatments:  PROM / STM / Oscillations-Mobs:  G-I, II, III, IV (PA's, Inf., Post.)  [x] (67073) Provided manual therapy to mobilize LE, proximal hip and/or LS spine soft tissue/joints for the purpose of modulating pain, promoting relaxation,  increasing ROM, reducing/eliminating soft tissue swelling/inflammation/restriction, improving soft tissue extensibility and allowing for proper ROM for normal function with self care, mobility, lifting and ambulation. Modalities:     [] GAME READY (VASO)- for significant edema, swelling, pain control. Charges  Timed Code Treatment Minutes: 50   Total Treatment Minutes: 50     Medicare total (add KX at $2000)         BWC time in/ time out:    TE time in /out    Manual time in/out    Neuro re ed time in/out    Untimed minutes        [] EVAL (LOW) 71987   [] EVAL (MOD) 67418  [] EVAL (HIGH) 93301   [] RE-EVAL     [x] WX(06613) x1     [] IONTO  [x] NMR (22864) x 1    [] VASO  [x] Manual (51698) x 1     [] Other:  [] TA x      [] Mech Traction (70034)  [] ES(attended) (22454)      [] ES (un) (07961):       GOALS:  Patient stated goal: get rid of R knee pain   [x] Progressing: [] Met: [] Not Met: [] Adjusted    Therapist goals for Patient:   Short Term Goals: To be achieved in: 2 weeks  1. Independent in HEP and progression per patient tolerance, in order to prevent re-injury.    [] Progressing: [x] Met: [] Not Met: [] Adjusted  2. Patient will have a decrease in pain to facilitate improvement in movement, function, and ADLs as indicated by Functional Deficits. [x] Progressing: [] Met: [] Not Met: [] Adjusted    Long Term Goals: To be achieved in: 6-8 weeks  1. Disability index score of 64% or more per FOTO to assist with reaching prior level of function. [] Progressing: [] Met: [] Not Met: [] Adjusted  2. Patient will demonstrate increased AROM to R knee flex to 120 and R hip ex to 0  to allow for proper joint functioning as indicated by patients Functional Deficits. [] Progressing: [x] Met: [] Not Met: [] Adjusted  3. Patient will demonstrate an increase in Strength to good proximal hip strength and control, within 5lb HHD in LE to allow for proper functional mobility as indicated by patients Functional Deficits. [x] Progressing: [] Met: [] Not Met: [] Adjusted  4. Patient will return to being able to ambulate with proper gait without an AD, ascend descend steps in home with reciprocal gait holding onto 1 railing  functional activities without increased symptoms or restriction. [x] Progressing: [] Met: [] Not Met: [] Adjusted  5. Patient able to return to motorcycle riding with    [] Progressing: [] Met: [] Not Met: [] Adjusted     Progression Towards Functional goals:  [x] Patient is progressing as expected towards functional goals listed. [] Progression is slowed due to complexities listed. [] Progression has been slowed due to co-morbidities. [] Plan just implemented, too soon to assess goals progression  [] Other:         Overall Progression Towards Functional goals/ Treatment Progress Update:  [x] Patient is progressing as expected towards functional goals listed. [] Progression is slowed due to complexities/Impairments listed. [] Progression has been slowed due to co-morbidities.   [] Plan just implemented, too soon to assess goals progression <30days   [] Goals require adjustment due to lack of progress  [] Patient is not progressing as expected and requires additional follow up with physician  [] Other    Prognosis for POC: [x] Good [] Fair  [] Poor      Patient requires continued skilled intervention: [x] Yes  [] No    Treatment/Activity Tolerance:  [x] Patient able to complete treatment  [] Patient limited by fatigue  [] Patient limited by pain    [] Patient limited by other medical complications  [] Other:     ASSESSMENT:   improving hip and knee ROM and RLE strength. Improving gait without an AD and improved FOTO hip score. Tight R lateral quad and ITB near knee     Return to Play: (if applicable)   []  Stage 1: Intro to Strength   []  Stage 2: Return to Run and Strength   []  Stage 3: Return to Jump and Strength   []  Stage 4: Dynamic Strength and Agility   []  Stage 5: Sport Specific Training     []  Ready to Return to Play, Meets All Above Stages   []  Not Ready for Return to Sports   Comments:                         Access Code: [email or web address]  URL: [email or web address]  Date: 11/02/2022  Prepared by: Leveda Limerick    Exercises  Hooklying Clamshell with Resistance - 2 x daily - 7 x weekly - 2 sets - 10 reps - 5 hold  Supine Bridge with Mini Swiss Ball Between Knees - 2 x daily - 7 x weekly - 2 sets - 10 reps - 5 hold  Seated Table Hamstring Stretch - 2 x daily - 7 x weekly - 1 sets - 3 reps - 30 hold  Side Stepping with Counter Support - 2 x daily - 7 x weekly - 2 sets - 10 reps  Access Code: [email or web address]  URL: [email or web address]/  Date: 11/07/2022  Prepared by: Leveda Limerick    Exercises  Sidelying Pelvic Floor Contraction with Hip Abduction - 2 x daily - 7 x weekly - 2 sets - 10 reps - 2 hold  Access Code: [email or web address]  URL: [email or web address]  Date: 11/14/2022  Prepared by: Leveda Limerick    Exercises  Prone Quadriceps Stretch with Strap - 2 x daily - 7 x weekly - 1 sets - 10 reps - 10 hold  Access Code: [email or web address]  URL: [email or web address]/  Date: 11/21/2022  Prepared by: Sunrise Zepf    Exercises  Side Stepping with Resistance at Thighs and Counter Support - 2 x daily - 7 x weekly - 2 sets - 10 reps        PLAN:  Pt would like to try HEP on own and will return to PT for 2 remaining visits if needed   [x] Continue per plan of care [] Alter current plan (see comments above)  [] Plan of care initiated [] Hold pending MD visit [] Discharge      Electronically signed by:  Ton Jones, PT    Note: If patient does not return for scheduled/ recommended follow up visits, this note will serve as a discharge from care along with most recent update on progress.

## 2023-01-17 ENCOUNTER — TELEPHONE (OUTPATIENT)
Dept: FAMILY MEDICINE CLINIC | Age: 73
End: 2023-01-17

## 2023-01-17 DIAGNOSIS — E78.5 HYPERLIPIDEMIA, UNSPECIFIED HYPERLIPIDEMIA TYPE: Primary | ICD-10-CM

## 2023-01-17 NOTE — TELEPHONE ENCOUNTER
Walked in.   Can you order labs for her upcoming appointment/    Future Appointments   Date Time Provider Kwasi Byrne   1/26/2023  9:15 AM Yahaira Madison MD [facility]   2/21/2023  7:30 AM DO TAMERA Mckee

## 2023-01-26 ENCOUNTER — OFFICE VISIT (OUTPATIENT)
Dept: ORTHOPEDIC SURGERY | Age: 73
End: 2023-01-26

## 2023-01-26 VITALS — BODY MASS INDEX: 35.08 KG/M2 | WEIGHT: 198 LBS | HEIGHT: 63 IN

## 2023-01-26 DIAGNOSIS — Z96.641 S/P TOTAL RIGHT HIP ARTHROPLASTY: Primary | ICD-10-CM

## 2023-01-26 NOTE — PROGRESS NOTES
Dr Kirstin Gunn      Date /Time 1/26/2023       2:08 PM EDT  Name José Antonio Vincent             1950   Location  [facility]  MRN [id number]                Chief Complaint   Patient presents with    Follow-up     Right CHANTEL 10/03/2022        History of Present Illness      José Antonio Vincent is a 81 y.o. female is here for post-op visit after RIGHT  [address] Total Hip Arthroplasty      Patient presents to the office today for a follow-up visit. Patient is 3+ months status post right anterior total hip arthroplasty. Patient doing well at this time. Pain controlled. Patient denies any fever or chills. The pain has resolved. She is having more mild symptoms along her IT band. Physical Exam    Based off 1997 Exam Criteria    Ht 5' 3\" (1.6 m)   Wt 198 lb (89.8 kg)   BMI 35.07 kg/m²      Constitutional:       General: He is not in acute distress. Appearance: Normal appearance. RIGHT Hip: incision clean, intact, healing appropriately. No surrounding  erythema or fluctuance. Neuro intact distal. No evidence of DVT. Imaging       Right Hip: [facility]  Radiographs: X-rays were ordered today reviewed of the right hip.  3 views. AP pelvis, lateral, and false profile. It demonstrates a right total hip arthroplasty in good position. No evidence of loosening or periprosthetic fracture      Assessment and Plan    Tanvi was seen today for follow-up. Diagnoses and all orders for this visit:    S/P total right hip arthroplasty  -     XR HIP RIGHT (2-3 VIEWS); Future      Patient overall is doing very well. Patient will continue with home exercises. She will follow up 1 year from surgery or sooner if problems arise. We have discussed predental and preinvasive procedure antibiotics.     I discussed with José Antonio Vincent that her history, symptoms, signs, and imaging are most consistent with previous CHANTEL replacement    We reviewed the natural history of these conditions and treatment options ranging from conservative measures (rest, icing, activity modification, physical therapy, pain meds) to surgical options. In terms of treatment, I recommended continuing with rest, icing, avoidance of painful activities, NSAIDs or pain meds as tolerated, and physical therapy. We discussed surgical options as well, should conservative measures fail. Electronically signed by Gordy Schmidt MD on 1/26/2023 at 9:27 AM  This dictation was generated by voice recognition computer software. Although all attempts are made to edit the dictation for accuracy, there may be errors in the transcription that are not intended.

## 2023-01-30 RX ORDER — PRAVASTATIN SODIUM 80 MG/1
TABLET ORAL
Qty: 90 TABLET | Refills: 1 | Status: SHIPPED | OUTPATIENT
Start: 2023-01-30

## 2023-01-30 NOTE — TELEPHONE ENCOUNTER
Last ov 09/29/2022   Future Appointments   Date Time Provider Kwasi Byrne   2/21/2023  7:30 AM DO TAMERA Long

## 2023-02-03 DIAGNOSIS — E78.5 HYPERLIPIDEMIA, UNSPECIFIED HYPERLIPIDEMIA TYPE: ICD-10-CM

## 2023-02-03 LAB
ALBUMIN SERPL-MCNC: 4.3 G/DL (ref 3.4–5)
ALP BLD-CCNC: 66 U/L (ref 40–129)
ALT SERPL-CCNC: 36 U/L (ref 10–40)
AST SERPL-CCNC: 32 U/L (ref 15–37)
BILIRUB SERPL-MCNC: 0.4 MG/DL (ref 0–1)
BILIRUBIN DIRECT: <0.2 MG/DL (ref 0–0.3)
BILIRUBIN, INDIRECT: NORMAL MG/DL (ref 0–1)
CHOLESTEROL, TOTAL: 209 MG/DL (ref 0–199)
HDLC SERPL-MCNC: 52 MG/DL (ref 40–60)
LDL CHOLESTEROL CALCULATED: 129 MG/DL
TOTAL PROTEIN: 6.9 G/DL (ref 6.4–8.2)
TRIGL SERPL-MCNC: 139 MG/DL (ref 0–150)
VLDLC SERPL CALC-MCNC: 28 MG/DL

## 2023-02-21 ENCOUNTER — OFFICE VISIT (OUTPATIENT)
Dept: FAMILY MEDICINE CLINIC | Age: 73
End: 2023-02-21
Payer: MEDICARE

## 2023-02-21 VITALS
OXYGEN SATURATION: 95 % | HEART RATE: 91 BPM | TEMPERATURE: 96.6 F | HEIGHT: 64 IN | SYSTOLIC BLOOD PRESSURE: 139 MMHG | BODY MASS INDEX: 33.77 KG/M2 | WEIGHT: 197.8 LBS | DIASTOLIC BLOOD PRESSURE: 97 MMHG

## 2023-02-21 DIAGNOSIS — Z00.00 MEDICARE ANNUAL WELLNESS VISIT, SUBSEQUENT: Primary | ICD-10-CM

## 2023-02-21 DIAGNOSIS — E78.5 HYPERLIPIDEMIA, UNSPECIFIED HYPERLIPIDEMIA TYPE: ICD-10-CM

## 2023-02-21 PROCEDURE — G8484 FLU IMMUNIZE NO ADMIN: HCPCS | Performed by: FAMILY MEDICINE

## 2023-02-21 PROCEDURE — 3017F COLORECTAL CA SCREEN DOC REV: CPT | Performed by: FAMILY MEDICINE

## 2023-02-21 PROCEDURE — 1123F ACP DISCUSS/DSCN MKR DOCD: CPT | Performed by: FAMILY MEDICINE

## 2023-02-21 PROCEDURE — G0439 PPPS, SUBSEQ VISIT: HCPCS | Performed by: FAMILY MEDICINE

## 2023-02-21 RX ORDER — EZETIMIBE 10 MG/1
10 TABLET ORAL DAILY
Qty: 90 TABLET | Refills: 1 | Status: SHIPPED | OUTPATIENT
Start: 2023-02-21

## 2023-02-21 SDOH — ECONOMIC STABILITY: FOOD INSECURITY: WITHIN THE PAST 12 MONTHS, YOU WORRIED THAT YOUR FOOD WOULD RUN OUT BEFORE YOU GOT MONEY TO BUY MORE.: NEVER TRUE

## 2023-02-21 SDOH — ECONOMIC STABILITY: HOUSING INSECURITY
IN THE LAST 12 MONTHS, WAS THERE A TIME WHEN YOU DID NOT HAVE A STEADY PLACE TO SLEEP OR SLEPT IN A SHELTER (INCLUDING NOW)?: NO

## 2023-02-21 SDOH — ECONOMIC STABILITY: INCOME INSECURITY: HOW HARD IS IT FOR YOU TO PAY FOR THE VERY BASICS LIKE FOOD, HOUSING, MEDICAL CARE, AND HEATING?: NOT HARD AT ALL

## 2023-02-21 SDOH — ECONOMIC STABILITY: FOOD INSECURITY: WITHIN THE PAST 12 MONTHS, THE FOOD YOU BOUGHT JUST DIDN'T LAST AND YOU DIDN'T HAVE MONEY TO GET MORE.: NEVER TRUE

## 2023-02-21 ASSESSMENT — ENCOUNTER SYMPTOMS
BACK PAIN: 0
WHEEZING: 0
CHEST TIGHTNESS: 0
STRIDOR: 0
CHOKING: 0
SHORTNESS OF BREATH: 0
ABDOMINAL PAIN: 0
COUGH: 0

## 2023-02-21 ASSESSMENT — PATIENT HEALTH QUESTIONNAIRE - PHQ9
1. LITTLE INTEREST OR PLEASURE IN DOING THINGS: 0
SUM OF ALL RESPONSES TO PHQ QUESTIONS 1-9: 0
SUM OF ALL RESPONSES TO PHQ QUESTIONS 1-9: 0
2. FEELING DOWN, DEPRESSED OR HOPELESS: 0
SUM OF ALL RESPONSES TO PHQ9 QUESTIONS 1 & 2: 0
SUM OF ALL RESPONSES TO PHQ QUESTIONS 1-9: 0
SUM OF ALL RESPONSES TO PHQ QUESTIONS 1-9: 0

## 2023-02-21 ASSESSMENT — LIFESTYLE VARIABLES
HOW MANY STANDARD DRINKS CONTAINING ALCOHOL DO YOU HAVE ON A TYPICAL DAY: PATIENT DOES NOT DRINK
HOW OFTEN DO YOU HAVE A DRINK CONTAINING ALCOHOL: NEVER

## 2023-02-21 NOTE — PROGRESS NOTES
Subjective:      Patient ID: Georgia Osborn is a 81 y.o. female. MARTIN Thomas is here for Medicare annual wellness visit. We discussed her hyperlipidemia. She has no new complaints or problems. Review of Systems   Constitutional:  Negative for activity change, appetite change, chills, diaphoresis, fatigue, fever and unexpected weight change. Respiratory:  Negative for cough, choking, chest tightness, shortness of breath, wheezing and stridor. Cardiovascular:  Negative for chest pain, palpitations and leg swelling. Gastrointestinal:  Negative for abdominal pain. Genitourinary:  Negative for difficulty urinating. Musculoskeletal:  Negative for arthralgias and back pain. Skin:  Negative for rash. Neurological:  Negative for dizziness. Objective:   Physical Exam  Vitals and nursing note reviewed. Constitutional:       Appearance: She is well-developed. HENT:      Head: Normocephalic and atraumatic. Right Ear: External ear normal.      Left Ear: External ear normal.      Nose: Nose normal.   Eyes:      Conjunctiva/sclera: Conjunctivae normal.      Pupils: Pupils are equal, round, and reactive to light. Neck:      Thyroid: No thyromegaly. Vascular: No JVD. Trachea: No tracheal deviation. Cardiovascular:      Rate and Rhythm: Normal rate and regular rhythm. Heart sounds: Normal heart sounds. No murmur heard. No friction rub. No gallop. Pulmonary:      Effort: Pulmonary effort is normal. No respiratory distress. Breath sounds: Normal breath sounds. No stridor. No wheezing or rales. Chest:      Chest wall: No tenderness. Abdominal:      General: Bowel sounds are normal. There is no distension. Palpations: Abdomen is soft. There is no mass. Tenderness: There is no abdominal tenderness. There is no guarding or rebound. Musculoskeletal:         General: No tenderness. Normal range of motion. Cervical back: Normal range of motion and neck supple. Lymphadenopathy:      Cervical: No cervical adenopathy. Skin:     General: Skin is warm and dry. Coloration: Skin is not pale. Findings: No erythema or rash. Neurological:      Mental Status: She is alert and oriented to person, place, and time. Cranial Nerves: No cranial nerve deficit. Motor: No abnormal muscle tone. Coordination: Coordination normal.      Deep Tendon Reflexes: Reflexes are normal and symmetric. Reflexes normal.       Assessment:      1. Hyperlipidemia, unspecified hyperlipidemia type-continue Pravachol, low LDL diet, and Zetia 10 for 3 months and then repeat a liver and a lipid panel    - Lipid Panel; Future  - Hepatic Function Panel; Future    2.  Medicare annual wellness visit, subsequent          Plan:        As above    [address], DO

## 2023-03-20 ENCOUNTER — TELEPHONE (OUTPATIENT)
Dept: ORTHOPEDIC SURGERY | Age: 73
End: 2023-03-20

## 2023-03-20 DIAGNOSIS — Z96.641 S/P TOTAL RIGHT HIP ARTHROPLASTY: Primary | ICD-10-CM

## 2023-03-20 RX ORDER — AMOXICILLIN AND CLAVULANATE POTASSIUM 875; 125 MG/1; MG/1
TABLET, FILM COATED ORAL
Qty: 6 TABLET | Refills: 2 | Status: SHIPPED | OUTPATIENT
Start: 2023-03-20

## 2023-05-08 DIAGNOSIS — E78.5 HYPERLIPIDEMIA, UNSPECIFIED HYPERLIPIDEMIA TYPE: ICD-10-CM

## 2023-05-08 LAB
ALBUMIN SERPL-MCNC: 4.5 G/DL (ref 3.4–5)
ALP SERPL-CCNC: 65 U/L (ref 40–129)
ALT SERPL-CCNC: 33 U/L (ref 10–40)
AST SERPL-CCNC: 30 U/L (ref 15–37)
BILIRUB DIRECT SERPL-MCNC: <0.2 MG/DL (ref 0–0.3)
BILIRUB INDIRECT SERPL-MCNC: NORMAL MG/DL (ref 0–1)
BILIRUB SERPL-MCNC: 0.5 MG/DL (ref 0–1)
CHOLEST SERPL-MCNC: 177 MG/DL (ref 0–199)
HDLC SERPL-MCNC: 58 MG/DL (ref 40–60)
LDLC SERPL CALC-MCNC: 94 MG/DL
PROT SERPL-MCNC: 6.9 G/DL (ref 6.4–8.2)
TRIGL SERPL-MCNC: 127 MG/DL (ref 0–150)
VLDLC SERPL CALC-MCNC: 25 MG/DL

## 2023-05-09 DIAGNOSIS — E78.5 HYPERLIPIDEMIA, UNSPECIFIED HYPERLIPIDEMIA TYPE: Primary | ICD-10-CM

## 2023-07-13 ENCOUNTER — TELEPHONE (OUTPATIENT)
Dept: FAMILY MEDICINE CLINIC | Age: 73
End: 2023-07-13

## 2023-07-13 ENCOUNTER — TELEPHONE (OUTPATIENT)
Dept: PULMONOLOGY | Age: 73
End: 2023-07-13

## 2023-07-13 DIAGNOSIS — R91.1 PULMONARY NODULE: Primary | ICD-10-CM

## 2023-07-13 NOTE — TELEPHONE ENCOUNTER
----- Message from Lyle Kinney sent at 7/13/2023 10:59 AM EDT -----  Subject: Appointment Request    Reason for Call: Established Patient Appointment needed: Routine Existing   Condition Follow Up    QUESTIONS    Reason for appointment request? No appointments available during search     Additional Information for Provider?  PT IS HAVING ABD PAIN AND WENT TO HER   GASTROENTEROLOGIST DR Dawson PATTERSON AND HAD CT SCAN ON 7- AND PT WAS   TOLD THERE IS NODULE IN HER LUNG AND PT NEEDS A F/U APPT FOR THIS ASAP  ---------------------------------------------------------------------------  --------------  Caterina Bearden QNXT  [phone]; OK to leave message on voicemail  ---------------------------------------------------------------------------  --------------  SCRIPT ANSWERS

## 2023-07-13 NOTE — TELEPHONE ENCOUNTER
LVM with referral information.     [facility] Pulmonary, Sleep and [address] [place], [address] [facility] [address], [address]   Geena Munguia   Phone: [phone]

## 2023-07-18 ENCOUNTER — TELEPHONE (OUTPATIENT)
Dept: PULMONOLOGY | Age: 73
End: 2023-07-18

## 2023-07-18 ENCOUNTER — OFFICE VISIT (OUTPATIENT)
Dept: PULMONOLOGY | Age: 73
End: 2023-07-18
Payer: MEDICARE

## 2023-07-18 VITALS
OXYGEN SATURATION: 97 % | HEIGHT: 64 IN | DIASTOLIC BLOOD PRESSURE: 78 MMHG | SYSTOLIC BLOOD PRESSURE: 138 MMHG | BODY MASS INDEX: 33.97 KG/M2 | WEIGHT: 199 LBS | HEART RATE: 103 BPM

## 2023-07-18 DIAGNOSIS — R91.1 PULMONARY NODULE: Primary | ICD-10-CM

## 2023-07-18 PROCEDURE — G8417 CALC BMI ABV UP PARAM F/U: HCPCS | Performed by: INTERNAL MEDICINE

## 2023-07-18 PROCEDURE — 1090F PRES/ABSN URINE INCON ASSESS: CPT | Performed by: INTERNAL MEDICINE

## 2023-07-18 PROCEDURE — 99204 OFFICE O/P NEW MOD 45 MIN: CPT | Performed by: INTERNAL MEDICINE

## 2023-07-18 PROCEDURE — 1036F TOBACCO NON-USER: CPT | Performed by: INTERNAL MEDICINE

## 2023-07-18 PROCEDURE — G8427 DOCREV CUR MEDS BY ELIG CLIN: HCPCS | Performed by: INTERNAL MEDICINE

## 2023-07-18 PROCEDURE — 3017F COLORECTAL CA SCREEN DOC REV: CPT | Performed by: INTERNAL MEDICINE

## 2023-07-18 PROCEDURE — 1123F ACP DISCUSS/DSCN MKR DOCD: CPT | Performed by: INTERNAL MEDICINE

## 2023-07-18 PROCEDURE — G8399 PT W/DXA RESULTS DOCUMENT: HCPCS | Performed by: INTERNAL MEDICINE

## 2023-07-18 NOTE — PROGRESS NOTES
not interested at this time  -    Flu and Pneumovax as per prior    Thank you for allowing me to participate in [facility]. I will keep following with you ,should you have any concerns ,please contact us at [facility] pulmonary office     Sincerely,        Tom Elizabeth MD  Pulmonary & Critical Care Medicine     NOTE: This report was transcribed using voice recognition software. Every effort was made to ensure accuracy; however, inadvertent computerized transcription errors may be present.

## 2023-07-25 RX ORDER — PRAVASTATIN SODIUM 80 MG/1
TABLET ORAL
Qty: 90 TABLET | Refills: 1 | Status: SHIPPED | OUTPATIENT
Start: 2023-07-25

## 2023-07-25 NOTE — TELEPHONE ENCOUNTER
Future Appointments   Date Time Provider [address]   8/21/2023 10:30 AM DO TAMERA [place] [facility]   10/11/2023  2:45 PM MD EARLENE Juárez 2/21/2023

## 2023-08-04 ENCOUNTER — HOSPITAL ENCOUNTER (OUTPATIENT)
Dept: CT IMAGING | Age: 73
Discharge: HOME | End: 2023-08-04
Attending: INTERNAL MEDICINE
Payer: MEDICARE

## 2023-08-04 DIAGNOSIS — R91.1 PULMONARY NODULE: ICD-10-CM

## 2023-08-04 PROCEDURE — 71250 CT THORAX DX C-: CPT

## 2023-08-18 RX ORDER — EZETIMIBE 10 MG/1
TABLET ORAL
Qty: 90 TABLET | Refills: 0 | Status: SHIPPED | OUTPATIENT
Start: 2023-08-18

## 2023-08-21 ENCOUNTER — OFFICE VISIT (OUTPATIENT)
Dept: FAMILY MEDICINE CLINIC | Age: 73
End: 2023-08-21

## 2023-08-21 VITALS
DIASTOLIC BLOOD PRESSURE: 80 MMHG | BODY MASS INDEX: 33.94 KG/M2 | SYSTOLIC BLOOD PRESSURE: 134 MMHG | OXYGEN SATURATION: 97 % | HEIGHT: 64 IN | WEIGHT: 198.8 LBS | HEART RATE: 81 BPM

## 2023-08-21 DIAGNOSIS — M19.90 ARTHRITIS: ICD-10-CM

## 2023-08-21 DIAGNOSIS — E66.3 OVERWEIGHT: ICD-10-CM

## 2023-08-21 DIAGNOSIS — E78.5 HYPERLIPIDEMIA, UNSPECIFIED HYPERLIPIDEMIA TYPE: Primary | ICD-10-CM

## 2023-08-21 ASSESSMENT — ENCOUNTER SYMPTOMS
CHEST TIGHTNESS: 0
CONSTIPATION: 1
WHEEZING: 0
COUGH: 0
BACK PAIN: 0
CHOKING: 0
ABDOMINAL PAIN: 0
SHORTNESS OF BREATH: 0
STRIDOR: 0

## 2023-08-21 NOTE — PROGRESS NOTES
Subjective:      Patient ID: Poncho Burk is a 72 y.o. female. MARTIN Maurice is here for follow-up on hyperlipidemia. I sent her to the lab for liver and lipid panel for follow-up. She is responding well to meloxicam for her arthritis. She remains overweight. She is recently been seeing a gastroenterologist regarding IBS-C. Review of Systems   Constitutional:  Negative for activity change, appetite change, chills, diaphoresis, fatigue, fever and unexpected weight change. Respiratory:  Negative for cough, choking, chest tightness, shortness of breath, wheezing and stridor. Cardiovascular:  Negative for chest pain, palpitations and leg swelling. Gastrointestinal:  Positive for constipation. Negative for abdominal pain. Genitourinary:  Negative for difficulty urinating. Musculoskeletal:  Negative for arthralgias and back pain. Skin:  Negative for rash. Neurological:  Negative for dizziness. Objective:   Physical Exam  Vitals and nursing note reviewed. Constitutional:       Appearance: She is well-developed. HENT:      Head: Normocephalic and atraumatic. Right Ear: External ear normal.      Left Ear: External ear normal.      Nose: Nose normal.   Eyes:      Conjunctiva/sclera: Conjunctivae normal.      Pupils: Pupils are equal, round, and reactive to light. Neck:      Thyroid: No thyromegaly. Vascular: No JVD. Trachea: No tracheal deviation. Cardiovascular:      Rate and Rhythm: Normal rate and regular rhythm. Heart sounds: Normal heart sounds. No murmur heard. No friction rub. No gallop. Pulmonary:      Effort: Pulmonary effort is normal. No respiratory distress. Breath sounds: Normal breath sounds. No stridor. No wheezing or rales. Chest:      Chest wall: No tenderness. Abdominal:      General: Bowel sounds are normal. There is no distension. Palpations: Abdomen is soft. There is no mass. Tenderness: There is no abdominal tenderness.  There

## 2023-08-29 RX ORDER — EZETIMIBE 10 MG/1
TABLET ORAL
Qty: 90 TABLET | Refills: 0 | OUTPATIENT
Start: 2023-08-29

## 2023-09-14 ENCOUNTER — HOSPITAL ENCOUNTER (EMERGENCY)
Age: 73
Discharge: HOME | End: 2023-09-14
Payer: MEDICARE

## 2023-09-14 ENCOUNTER — APPOINTMENT (OUTPATIENT)
Dept: CT IMAGING | Age: 73
End: 2023-09-14
Payer: MEDICARE

## 2023-09-14 VITALS
TEMPERATURE: 98.1 F | DIASTOLIC BLOOD PRESSURE: 89 MMHG | HEART RATE: 84 BPM | SYSTOLIC BLOOD PRESSURE: 153 MMHG | RESPIRATION RATE: 15 BRPM | HEIGHT: 64 IN | WEIGHT: 190 LBS | BODY MASS INDEX: 32.44 KG/M2 | OXYGEN SATURATION: 97 %

## 2023-09-14 DIAGNOSIS — R10.32 LEFT LOWER QUADRANT ABDOMINAL PAIN: Primary | ICD-10-CM

## 2023-09-14 LAB
ALBUMIN SERPL-MCNC: 4.8 G/DL (ref 3.4–5)
ALBUMIN/GLOB SERPL: 1.6 {RATIO} (ref 1.1–2.2)
ALP SERPL-CCNC: 77 U/L (ref 40–129)
ALT SERPL-CCNC: 43 U/L (ref 10–40)
ANION GAP SERPL CALCULATED.3IONS-SCNC: 14 MMOL/L (ref 3–16)
AST SERPL-CCNC: 38 U/L (ref 15–37)
BACTERIA URNS QL MICRO: ABNORMAL /HPF
BASOPHILS # BLD: 0.1 K/UL (ref 0–0.2)
BASOPHILS NFR BLD: 0.6 %
BILIRUB SERPL-MCNC: 0.6 MG/DL (ref 0–1)
BILIRUB UR QL STRIP.AUTO: NEGATIVE
BUN SERPL-MCNC: 12 MG/DL (ref 7–20)
CALCIUM SERPL-MCNC: 10.3 MG/DL (ref 8.3–10.6)
CHLORIDE SERPL-SCNC: 102 MMOL/L (ref 99–110)
CLARITY UR: CLEAR
CO2 SERPL-SCNC: 20 MMOL/L (ref 21–32)
COLOR UR: YELLOW
CREAT SERPL-MCNC: 0.7 MG/DL (ref 0.6–1.2)
DEPRECATED RDW RBC AUTO: 14.5 % (ref 12.4–15.4)
EOSINOPHIL # BLD: 0 K/UL (ref 0–0.6)
EOSINOPHIL NFR BLD: 0.2 %
EPI CELLS #/AREA URNS HPF: ABNORMAL /HPF (ref 0–5)
GFR SERPLBLD CREATININE-BSD FMLA CKD-EPI: >60 ML/MIN/{1.73_M2}
GLUCOSE SERPL-MCNC: 102 MG/DL (ref 70–99)
GLUCOSE UR STRIP.AUTO-MCNC: NEGATIVE MG/DL
HCT VFR BLD AUTO: 49.2 % (ref 36–48)
HGB BLD-MCNC: 16.4 G/DL (ref 12–16)
HGB UR QL STRIP.AUTO: ABNORMAL
HYALINE CASTS #/AREA URNS LPF: ABNORMAL /LPF (ref 0–2)
KETONES UR STRIP.AUTO-MCNC: NEGATIVE MG/DL
LEUKOCYTE ESTERASE UR QL STRIP.AUTO: NEGATIVE
LIPASE SERPL-CCNC: 30 U/L (ref 13–60)
LYMPHOCYTES # BLD: 1.7 K/UL (ref 1–5.1)
LYMPHOCYTES NFR BLD: 17.3 %
MCH RBC QN AUTO: 29.8 PG (ref 26–34)
MCHC RBC AUTO-ENTMCNC: 33.4 G/DL (ref 31–36)
MCV RBC AUTO: 89 FL (ref 80–100)
MONOCYTES # BLD: 0.5 K/UL (ref 0–1.3)
MONOCYTES NFR BLD: 5.1 %
MUCOUS THREADS #/AREA URNS LPF: ABNORMAL /LPF
NEUTROPHILS # BLD: 7.8 K/UL (ref 1.7–7.7)
NEUTROPHILS NFR BLD: 76.8 %
NITRITE UR QL STRIP.AUTO: NEGATIVE
PH UR STRIP.AUTO: 7.5 [PH] (ref 5–8)
PLATELET # BLD AUTO: 220 K/UL (ref 135–450)
PMV BLD AUTO: 8 FL (ref 5–10.5)
POTASSIUM SERPL-SCNC: 4.1 MMOL/L (ref 3.5–5.1)
PROT SERPL-MCNC: 7.8 G/DL (ref 6.4–8.2)
PROT UR STRIP.AUTO-MCNC: NEGATIVE MG/DL
RBC # BLD AUTO: 5.52 M/UL (ref 4–5.2)
RBC #/AREA URNS HPF: ABNORMAL /HPF (ref 0–4)
SODIUM SERPL-SCNC: 136 MMOL/L (ref 136–145)
SP GR UR STRIP.AUTO: 1.01 (ref 1–1.03)
TROPONIN, HIGH SENSITIVITY: 8 NG/L (ref 0–14)
UA COMPLETE W REFLEX CULTURE PNL UR: ABNORMAL
UA DIPSTICK W REFLEX MICRO PNL UR: YES
URN SPEC COLLECT METH UR: ABNORMAL
UROBILINOGEN UR STRIP-ACNC: 0.2 E.U./DL
WBC # BLD AUTO: 10.1 K/UL (ref 4–11)
WBC #/AREA URNS HPF: ABNORMAL /HPF (ref 0–5)

## 2023-09-14 PROCEDURE — 85025 COMPLETE CBC W/AUTO DIFF WBC: CPT

## 2023-09-14 PROCEDURE — 99285 EMERGENCY DEPT VISIT HI MDM: CPT

## 2023-09-14 PROCEDURE — 80053 COMPREHEN METABOLIC PANEL: CPT

## 2023-09-14 PROCEDURE — 83690 ASSAY OF LIPASE: CPT

## 2023-09-14 PROCEDURE — 81001 URINALYSIS AUTO W/SCOPE: CPT

## 2023-09-14 PROCEDURE — 84484 ASSAY OF TROPONIN QUANT: CPT

## 2023-09-14 PROCEDURE — 74177 CT ABD & PELVIS W/CONTRAST: CPT

## 2023-09-14 PROCEDURE — 6360000004 HC RX CONTRAST MEDICATION: Performed by: NURSE PRACTITIONER

## 2023-09-14 RX ADMIN — IOPAMIDOL 75 ML: 755 INJECTION, SOLUTION INTRAVENOUS at 16:43

## 2023-09-14 ASSESSMENT — PAIN SCALES - GENERAL: PAINLEVEL_OUTOF10: 2

## 2023-09-14 ASSESSMENT — PAIN DESCRIPTION - ORIENTATION: ORIENTATION: LEFT

## 2023-09-14 ASSESSMENT — PAIN DESCRIPTION - DESCRIPTORS: DESCRIPTORS: ACHING;DISCOMFORT

## 2023-09-14 ASSESSMENT — PAIN DESCRIPTION - PAIN TYPE: TYPE: ACUTE PAIN

## 2023-09-14 ASSESSMENT — PAIN - FUNCTIONAL ASSESSMENT: PAIN_FUNCTIONAL_ASSESSMENT: 0-10

## 2023-09-14 ASSESSMENT — PAIN DESCRIPTION - LOCATION: LOCATION: ABDOMEN

## 2023-09-15 ENCOUNTER — TELEPHONE (OUTPATIENT)
Dept: PULMONOLOGY | Age: 73
End: 2023-09-15

## 2023-09-15 NOTE — TELEPHONE ENCOUNTER
Patient cancelled appointment on 10/11/23 with Dr. Albert Aguiar for 3 month ct f/u (Proscan). Reason: Pt states she read her results online and does not feel she needs to be seen by Dr. Albert Aguiar anymore. Patient did not reschedule appointment. Appointment rescheduled for na.      Last OV 7/18/23   IMPRESSION:    1-lung nodule left lower lobe  2-possible FLOR  3  PLAN:      -Review old image shows that lung nodule was a presented 2019 CAT scan so I am not concerned about the lung nodule in the left lower lobe however giving her anxiety we will get full CAT scan to make sure no other nodules, if there is no other nodules then there is no follow-up needed at this time     If we see a new lung nodules in the right or any other area of the lung then we will follow this nodule according to side pericardial  -  -Plan the necessity of doing sleep study as she has symptoms suggestive for it however she is not interested at this time

## 2023-11-14 RX ORDER — EZETIMIBE 10 MG/1
TABLET ORAL
Qty: 90 TABLET | Refills: 1 | Status: SHIPPED | OUTPATIENT
Start: 2023-11-14

## 2024-01-12 ENCOUNTER — HOSPITAL ENCOUNTER (OUTPATIENT)
Dept: GENERAL RADIOLOGY | Age: 74
Discharge: HOME | End: 2024-01-12
Payer: MEDICARE

## 2024-01-12 ENCOUNTER — HOSPITAL ENCOUNTER (OUTPATIENT)
Age: 74
Discharge: HOME | End: 2024-01-12
Payer: MEDICARE

## 2024-01-12 DIAGNOSIS — R10.32 ABDOMINAL PAIN, LEFT LOWER QUADRANT: ICD-10-CM

## 2024-01-12 PROCEDURE — 74018 RADEX ABDOMEN 1 VIEW: CPT

## 2024-01-15 NOTE — TELEPHONE ENCOUNTER
Last ov 8/21/23   Future Appointments   Date Time Provider Department Center   1/18/2024  9:00 AM Horace Coelho MD [facility]   2/22/2024 10:00 AM Nav Rausch DO [facility] [place] [facility]

## 2024-01-16 RX ORDER — PRAVASTATIN SODIUM 80 MG/1
TABLET ORAL
Qty: 90 TABLET | Refills: 1 | Status: SHIPPED | OUTPATIENT
Start: 2024-01-16

## 2024-01-18 ENCOUNTER — OFFICE VISIT (OUTPATIENT)
Dept: ORTHOPEDIC SURGERY | Age: 74
End: 2024-01-18
Payer: MEDICARE

## 2024-01-18 VITALS — HEIGHT: 64 IN | BODY MASS INDEX: 32.44 KG/M2 | WEIGHT: 190 LBS

## 2024-01-18 DIAGNOSIS — E78.5 HYPERLIPIDEMIA, UNSPECIFIED HYPERLIPIDEMIA TYPE: ICD-10-CM

## 2024-01-18 DIAGNOSIS — Z96.642 HISTORY OF TOTAL HIP ARTHROPLASTY, LEFT: Primary | ICD-10-CM

## 2024-01-18 DIAGNOSIS — Z96.642 HISTORY OF TOTAL HIP ARTHROPLASTY, LEFT: ICD-10-CM

## 2024-01-18 LAB
ALBUMIN SERPL-MCNC: 4.6 G/DL (ref 3.4–5)
ALP SERPL-CCNC: 68 U/L (ref 40–129)
ALT SERPL-CCNC: 48 U/L (ref 10–40)
AST SERPL-CCNC: 45 U/L (ref 15–37)
BASOPHILS # BLD: 0.1 K/UL (ref 0–0.2)
BASOPHILS NFR BLD: 1 %
BILIRUB DIRECT SERPL-MCNC: <0.2 MG/DL (ref 0–0.3)
BILIRUB INDIRECT SERPL-MCNC: ABNORMAL MG/DL (ref 0–1)
BILIRUB SERPL-MCNC: 0.3 MG/DL (ref 0–1)
CHOLEST SERPL-MCNC: 159 MG/DL (ref 0–199)
CRP SERPL-MCNC: <3 MG/L (ref 0–5.1)
DEPRECATED RDW RBC AUTO: 14.1 % (ref 12.4–15.4)
EOSINOPHIL # BLD: 0.1 K/UL (ref 0–0.6)
EOSINOPHIL NFR BLD: 1.9 %
ERYTHROCYTE [SEDIMENTATION RATE] IN BLOOD BY WESTERGREN METHOD: 13 MM/HR (ref 0–30)
HCT VFR BLD AUTO: 47.9 % (ref 36–48)
HDLC SERPL-MCNC: 47 MG/DL (ref 40–60)
HGB BLD-MCNC: 15.6 G/DL (ref 12–16)
LDLC SERPL CALC-MCNC: 74 MG/DL
LYMPHOCYTES # BLD: 2 K/UL (ref 1–5.1)
LYMPHOCYTES NFR BLD: 36.7 %
MCH RBC QN AUTO: 29.5 PG (ref 26–34)
MCHC RBC AUTO-ENTMCNC: 32.6 G/DL (ref 31–36)
MCV RBC AUTO: 90.6 FL (ref 80–100)
MONOCYTES # BLD: 0.4 K/UL (ref 0–1.3)
MONOCYTES NFR BLD: 7.6 %
NEUTROPHILS # BLD: 2.9 K/UL (ref 1.7–7.7)
NEUTROPHILS NFR BLD: 52.8 %
PLATELET # BLD AUTO: 194 K/UL (ref 135–450)
PMV BLD AUTO: 10.3 FL (ref 5–10.5)
PROT SERPL-MCNC: 7 G/DL (ref 6.4–8.2)
RBC # BLD AUTO: 5.29 M/UL (ref 4–5.2)
TRIGL SERPL-MCNC: 190 MG/DL (ref 0–150)
VLDLC SERPL CALC-MCNC: 38 MG/DL
WBC # BLD AUTO: 5.5 K/UL (ref 4–11)

## 2024-01-18 PROCEDURE — 99214 OFFICE O/P EST MOD 30 MIN: CPT | Performed by: ORTHOPAEDIC SURGERY

## 2024-01-18 PROCEDURE — 3017F COLORECTAL CA SCREEN DOC REV: CPT | Performed by: ORTHOPAEDIC SURGERY

## 2024-01-18 PROCEDURE — 1090F PRES/ABSN URINE INCON ASSESS: CPT | Performed by: ORTHOPAEDIC SURGERY

## 2024-01-18 PROCEDURE — 1036F TOBACCO NON-USER: CPT | Performed by: ORTHOPAEDIC SURGERY

## 2024-01-18 PROCEDURE — G8399 PT W/DXA RESULTS DOCUMENT: HCPCS | Performed by: ORTHOPAEDIC SURGERY

## 2024-01-18 PROCEDURE — G8417 CALC BMI ABV UP PARAM F/U: HCPCS | Performed by: ORTHOPAEDIC SURGERY

## 2024-01-18 PROCEDURE — G8428 CUR MEDS NOT DOCUMENT: HCPCS | Performed by: ORTHOPAEDIC SURGERY

## 2024-01-18 PROCEDURE — 1123F ACP DISCUSS/DSCN MKR DOCD: CPT | Performed by: ORTHOPAEDIC SURGERY

## 2024-01-18 PROCEDURE — G8484 FLU IMMUNIZE NO ADMIN: HCPCS | Performed by: ORTHOPAEDIC SURGERY
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
## 2024-01-23 ENCOUNTER — OFFICE VISIT (OUTPATIENT)
Dept: ORTHOPEDIC SURGERY | Age: 74
End: 2024-01-23
Payer: MEDICARE

## 2024-01-23 VITALS — WEIGHT: 190 LBS | BODY MASS INDEX: 32.44 KG/M2 | HEIGHT: 64 IN

## 2024-01-23 DIAGNOSIS — Z96.641 S/P TOTAL RIGHT HIP ARTHROPLASTY: Primary | ICD-10-CM

## 2024-01-23 PROCEDURE — G8427 DOCREV CUR MEDS BY ELIG CLIN: HCPCS | Performed by: ORTHOPAEDIC SURGERY

## 2024-01-23 PROCEDURE — 99213 OFFICE O/P EST LOW 20 MIN: CPT | Performed by: ORTHOPAEDIC SURGERY

## 2024-01-23 PROCEDURE — 1036F TOBACCO NON-USER: CPT | Performed by: ORTHOPAEDIC SURGERY

## 2024-01-23 PROCEDURE — 3017F COLORECTAL CA SCREEN DOC REV: CPT | Performed by: ORTHOPAEDIC SURGERY

## 2024-01-23 PROCEDURE — 1123F ACP DISCUSS/DSCN MKR DOCD: CPT | Performed by: ORTHOPAEDIC SURGERY

## 2024-01-23 PROCEDURE — 1090F PRES/ABSN URINE INCON ASSESS: CPT | Performed by: ORTHOPAEDIC SURGERY

## 2024-01-23 PROCEDURE — G8484 FLU IMMUNIZE NO ADMIN: HCPCS | Performed by: ORTHOPAEDIC SURGERY

## 2024-01-23 PROCEDURE — G8417 CALC BMI ABV UP PARAM F/U: HCPCS | Performed by: ORTHOPAEDIC SURGERY

## 2024-01-23 PROCEDURE — G8399 PT W/DXA RESULTS DOCUMENT: HCPCS | Performed by: ORTHOPAEDIC SURGERY

## 2024-01-23 RX ORDER — DICYCLOMINE HCL 20 MG
TABLET ORAL
COMMUNITY
Start: 2024-01-16
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
## 2024-02-22 ENCOUNTER — OFFICE VISIT (OUTPATIENT)
Dept: FAMILY MEDICINE CLINIC | Age: 74
End: 2024-02-22

## 2024-02-22 VITALS
BODY MASS INDEX: 33.12 KG/M2 | HEART RATE: 95 BPM | TEMPERATURE: 97.3 F | HEIGHT: 64 IN | SYSTOLIC BLOOD PRESSURE: 108 MMHG | WEIGHT: 194 LBS | DIASTOLIC BLOOD PRESSURE: 70 MMHG | OXYGEN SATURATION: 96 % | RESPIRATION RATE: 14 BRPM

## 2024-02-22 DIAGNOSIS — Z00.00 MEDICARE ANNUAL WELLNESS VISIT, SUBSEQUENT: Primary | ICD-10-CM

## 2024-02-22 SDOH — ECONOMIC STABILITY: FOOD INSECURITY: WITHIN THE PAST 12 MONTHS, THE FOOD YOU BOUGHT JUST DIDN'T LAST AND YOU DIDN'T HAVE MONEY TO GET MORE.: NEVER TRUE

## 2024-02-22 SDOH — ECONOMIC STABILITY: INCOME INSECURITY: HOW HARD IS IT FOR YOU TO PAY FOR THE VERY BASICS LIKE FOOD, HOUSING, MEDICAL CARE, AND HEATING?: NOT HARD AT ALL

## 2024-02-22 SDOH — ECONOMIC STABILITY: FOOD INSECURITY: WITHIN THE PAST 12 MONTHS, YOU WORRIED THAT YOUR FOOD WOULD RUN OUT BEFORE YOU GOT MONEY TO BUY MORE.: NEVER TRUE

## 2024-02-22 ASSESSMENT — PATIENT HEALTH QUESTIONNAIRE - PHQ9
SUM OF ALL RESPONSES TO PHQ QUESTIONS 1-9: 0
SUM OF ALL RESPONSES TO PHQ QUESTIONS 1-9: 0
SUM OF ALL RESPONSES TO PHQ9 QUESTIONS 1 & 2: 0
SUM OF ALL RESPONSES TO PHQ QUESTIONS 1-9: 0
SUM OF ALL RESPONSES TO PHQ QUESTIONS 1-9: 0
2. FEELING DOWN, DEPRESSED OR HOPELESS: 0
1. LITTLE INTEREST OR PLEASURE IN DOING THINGS: 0

## 2024-02-22 NOTE — PROGRESS NOTES
Medicare Annual Wellness Visit    Tanvi Tamayo is here for Medicare AWV  Barbie has no new complaints or problems but is struggling with some IBS symptoms despite the fact that she is taking a fiber supplement and dicyclomine.  I recommended she try adding a probiotic like align and continue her dicyclomine and fiber supplement.  Assessment & Plan   Medicare annual wellness visit, subsequent  Recommendations for Preventive Services Due: see orders and patient instructions/AVS.  Recommended screening schedule for the next 5-10 years is provided to the patient in written form: see Patient Instructions/AVS.     No follow-ups on file.     Subjective       Patient's complete Health Risk Assessment and screening values have been reviewed and are found in Flowsheets. The following problems were reviewed today and where indicated follow up appointments were made and/or referrals ordered.    Positive Risk Factor Screenings with Interventions:                Activity, Diet, and Weight:  On average, how many days per week do you engage in moderate to strenuous exercise (like a brisk walk)?: 4 days  On average, how many minutes do you engage in exercise at this level?: 30 min    Do you eat balanced/healthy meals regularly?: Yes    Body mass index is 33.3 kg/m². (!) Abnormal    Obesity Interventions:  See A/P for plan and any pertinent orders               Safety:  Do you have non-slip mats or non-slip surfaces or shower bars or grab bars in your shower or bathtub?: (!) No  Interventions:  See A/P for plan and any pertinent orders                   Objective   Vitals:    02/22/24 0930   BP: 108/70   Site: Left Upper Arm   Pulse: 95   Resp: 14   Temp: 97.3 °F (36.3 °C)   TempSrc: Temporal   SpO2: 96%   Weight: 88 kg (194 lb)   Height: 1.626 m (5' 4\")      Body mass index is 33.3 kg/m².               Allergies   Allergen Reactions    Dye [Iodides] Hives     IVP before 1990    Tylox [Oxycodone-Acetaminophen] Nausea Only    Demerol

## 2024-05-06 RX ORDER — EZETIMIBE 10 MG/1
TABLET ORAL
Qty: 90 TABLET | Refills: 3 | Status: SHIPPED | OUTPATIENT
Start: 2024-05-06

## 2024-07-26 RX ORDER — PRAVASTATIN SODIUM 80 MG/1
TABLET ORAL
Qty: 90 TABLET | Refills: 2 | Status: SHIPPED | OUTPATIENT
Start: 2024-07-26

## 2024-07-26 NOTE — TELEPHONE ENCOUNTER
2/22/2024    Future Appointments   Date Time Provider Department Center   12/11/2024  9:00 AM Mercedes Mcdaniel DO [facility] [place] [facility]

## 2024-08-24 SDOH — HEALTH STABILITY: PHYSICAL HEALTH: ON AVERAGE, HOW MANY DAYS PER WEEK DO YOU ENGAGE IN MODERATE TO STRENUOUS EXERCISE (LIKE A BRISK WALK)?: 1 DAY

## 2024-08-27 ENCOUNTER — HOSPITAL ENCOUNTER (OUTPATIENT)
Dept: WOMENS IMAGING | Age: 74
Discharge: HOME | End: 2024-08-27
Payer: MEDICARE

## 2024-08-27 ENCOUNTER — OFFICE VISIT (OUTPATIENT)
Dept: FAMILY MEDICINE CLINIC | Age: 74
End: 2024-08-27

## 2024-08-27 VITALS
WEIGHT: 190.6 LBS | DIASTOLIC BLOOD PRESSURE: 66 MMHG | SYSTOLIC BLOOD PRESSURE: 128 MMHG | HEIGHT: 64 IN | HEART RATE: 97 BPM | OXYGEN SATURATION: 97 % | BODY MASS INDEX: 32.54 KG/M2

## 2024-08-27 VITALS — WEIGHT: 190 LBS | HEIGHT: 64 IN | BODY MASS INDEX: 32.44 KG/M2

## 2024-08-27 DIAGNOSIS — R10.32 LEFT LOWER QUADRANT ABDOMINAL PAIN: Primary | ICD-10-CM

## 2024-08-27 DIAGNOSIS — Z12.31 SCREENING MAMMOGRAM FOR BREAST CANCER: ICD-10-CM

## 2024-08-27 PROCEDURE — 77063 BREAST TOMOSYNTHESIS BI: CPT

## 2024-08-27 NOTE — PROGRESS NOTES
[facility]  Establish care visit   2024    Tanvi Tamayo (:  1950) is a 74 y.o. female, here to establish care.    Chief Complaint   Patient presents with    Establish Care    GI Problem     Pain in lower right side  Already had colonoscopy, mri, ct, x ray        ASSESSMENT/ PLAN  1. Left lower quadrant abdominal pain  Had a lengthy discussion with the patient about different options.  All imaging has previously been unremarkable.  It is possible that patient is having some symptoms of diverticulitis and at this point, it may be worth doing empiric treatment with antibiotics at this time.  Utilize shared decision making to move forward with empiric antibiotics for diverticulitis and seeing if symptoms improve.  If fail to improve, have a low threshold for ordering more lab work and potentially referring to another gastroenterologist for a second opinion.  - amoxicillin-clavulanate (AUGMENTIN) 875-125 MG per tablet; Take 1 tablet by mouth 2 times daily for 10 days  Dispense: 20 tablet; Refill: 0       On this date 24 I have spent 31 minutes reviewing previous notes, test results and face to face with the patient discussing the diagnosis and importance of compliance with the treatment plan as well as documenting on the day of the visit.      No follow-ups on file.    HPI  Patient is a 74-year-old female, who presents to clinic to establish care and to discuss left lower quadrant abdominal pain.  Patient's previous PCP was Dr. Rausch, from [place].  Patient states that she completed her AWV and physical earlier this year and she does not need to do so at this time.  She grew up in the [place] area, having lived in [place].  She now lives in [place] with her .  She has 2 children and 5 grandchildren who live in the area.  The grandchildren are ages 7, 9, 11, 21, and 24.      Patient would like to discuss a little bit more about her left lower

## 2024-09-17 ENCOUNTER — OFFICE VISIT (OUTPATIENT)
Dept: FAMILY MEDICINE CLINIC | Age: 74
End: 2024-09-17

## 2024-09-17 VITALS
OXYGEN SATURATION: 95 % | HEART RATE: 94 BPM | SYSTOLIC BLOOD PRESSURE: 132 MMHG | WEIGHT: 189.8 LBS | HEIGHT: 64 IN | BODY MASS INDEX: 32.4 KG/M2 | DIASTOLIC BLOOD PRESSURE: 70 MMHG

## 2024-09-17 DIAGNOSIS — R10.32 LEFT LOWER QUADRANT ABDOMINAL PAIN: Primary | ICD-10-CM

## 2024-09-23 ENCOUNTER — TELEPHONE (OUTPATIENT)
Dept: ORTHOPEDIC SURGERY | Age: 74
End: 2024-09-23

## 2024-09-23 NOTE — TELEPHONE ENCOUNTER
General Question     Subject: ANTI-BIOTIC  Patient and /or Facility Request: Tanvi Tamayo   Contact Number: [phone]     PATIENT REQUESTING A PRESCRIPTION FOR AN ANTI-BIOTIC FOR HER UPCOMING DENTIST APPOINTMENT. PATIENT STATES SHE WILL GOING TWICE BACK TO BACK SO SHE WILL NEED TWO     PLEASE CALL THE PATIENT BACK AT THE ABOVE NUMBER

## 2024-10-07 ENCOUNTER — TELEPHONE (OUTPATIENT)
Dept: FAMILY MEDICINE CLINIC | Age: 74
End: 2024-10-07

## 2024-10-07 NOTE — TELEPHONE ENCOUNTER
Pt called to say that she is having pain in her lower left quadrant. Pain is 10 out of 10 today 10.07.24. Pt is asking what to do? This has been an on going issue. Pt offered appt with another provider, but declined. Pt advised to go the ER, but she stated she's gone to the ER in the past and they found nothing. Pt advised she could go to the [facility] and possibly be seen today 10.07.24. Pt declined that as well. Declined Urgent Care, too. Pt stated that she wants to see Dr. Hutson for now. No appt made with Dr. Hutson. She is asking for a call back.

## 2024-10-07 NOTE — TELEPHONE ENCOUNTER
Schedule an appointment with Dr. Hutson and let her know if symptoms worsen she needs to go to the ER.

## 2024-10-11 ENCOUNTER — OFFICE VISIT (OUTPATIENT)
Dept: FAMILY MEDICINE CLINIC | Age: 74
End: 2024-10-11

## 2024-10-11 VITALS
WEIGHT: 189 LBS | OXYGEN SATURATION: 97 % | HEART RATE: 86 BPM | SYSTOLIC BLOOD PRESSURE: 134 MMHG | BODY MASS INDEX: 32.44 KG/M2 | DIASTOLIC BLOOD PRESSURE: 76 MMHG

## 2024-10-11 DIAGNOSIS — R10.32 LEFT LOWER QUADRANT ABDOMINAL PAIN: Primary | ICD-10-CM

## 2024-10-11 NOTE — PROGRESS NOTES
Tanvi Tamayo (:  1950) is a 74 y.o. female,Established patient, here for evaluation of the following chief complaint(s):  GI Problem         Assessment & Plan  Left lower quadrant abdominal pain    Had an extensive conversation with the patient today looking for all possible considerations for was causing patient's abdominal pain.  Discussed risks, benefits, alternatives to the plan that we decided upon today.  Utilize issues and making for repeat lab work and repeat imaging, discussed MRI versus CT.  In the end, decided CT with IV contrast with additional oral contrast to see if there is something that the previous CT may have missed such as mass, kidney stone, torsion, ischemic bowel, diverticulitis, or otherwise.    Orders:    Comprehensive Metabolic Panel; Future    Lipase; Future    CT ABDOMEN PELVIS W IV CONTRAST Additional Contrast? Oral; Future    CBC; Future    Urinalysis with Microscopic ([facility] ONLY)      No follow-ups on file.       Subjective   HPI  Patient is a 74-year-old female, who presents to clinic for interval follow-up for chronic, intermittent, severe, left lower quadrant abdominal pain.  Patient has had some workup in the past including a CT with IV contrast but without oral contrast in the ER over 12 months ago.  Patient has had a colonoscopy which showed diverticulosis according to the patient, but did not show diverticulitis.  Pain has made multiple attempts in medications in the past including Linzess which gave her severe diarrhea, dicyclomine, which did not even touch the pain.  Patient has had blood work including CBC, CMP, lipase, ESR, CRP, and more.  Patient has seen a gastroenterologist, her orthopedist and cannot find any answers for was causing the severe intermittent pain.  She reports that it feels sharp like an ice pick and can even radiate to the back.  She has had urinalysis before that has shown some blood in her urine, but no kidney stone on prior imaging.

## 2024-10-14 ENCOUNTER — HOSPITAL ENCOUNTER (OUTPATIENT)
Age: 74
Discharge: HOME | End: 2024-10-14
Payer: MEDICARE

## 2024-10-14 DIAGNOSIS — R10.32 LEFT LOWER QUADRANT ABDOMINAL PAIN: ICD-10-CM

## 2024-10-14 LAB
ALBUMIN SERPL-MCNC: 4.2 G/DL (ref 3.4–5)
ALBUMIN/GLOB SERPL: 1.6 {RATIO} (ref 1.1–2.2)
ALP SERPL-CCNC: 66 U/L (ref 40–129)
ALT SERPL-CCNC: 42 U/L (ref 10–40)
ANION GAP SERPL CALCULATED.3IONS-SCNC: 11 MMOL/L (ref 3–16)
AST SERPL-CCNC: 51 U/L (ref 15–37)
BACTERIA URNS QL MICRO: ABNORMAL /HPF
BILIRUB SERPL-MCNC: 0.5 MG/DL (ref 0–1)
BILIRUB UR QL STRIP.AUTO: NEGATIVE
BUN SERPL-MCNC: 14 MG/DL (ref 7–20)
CALCIUM SERPL-MCNC: 9.6 MG/DL (ref 8.3–10.6)
CHLORIDE SERPL-SCNC: 106 MMOL/L (ref 99–110)
CLARITY UR: CLEAR
CO2 SERPL-SCNC: 25 MMOL/L (ref 21–32)
COLOR UR: YELLOW
CREAT SERPL-MCNC: 0.8 MG/DL (ref 0.6–1.2)
DEPRECATED RDW RBC AUTO: 14.3 % (ref 12.4–15.4)
EPI CELLS #/AREA URNS HPF: ABNORMAL /HPF (ref 0–5)
GFR SERPLBLD CREATININE-BSD FMLA CKD-EPI: 78 ML/MIN/{1.73_M2}
GLUCOSE SERPL-MCNC: 81 MG/DL (ref 70–99)
GLUCOSE UR STRIP.AUTO-MCNC: NEGATIVE MG/DL
HCT VFR BLD AUTO: 47 % (ref 36–48)
HGB BLD-MCNC: 15.7 G/DL (ref 12–16)
HGB UR QL STRIP.AUTO: NEGATIVE
KETONES UR STRIP.AUTO-MCNC: NEGATIVE MG/DL
LEUKOCYTE ESTERASE UR QL STRIP.AUTO: ABNORMAL
LIPASE SERPL-CCNC: 33 U/L (ref 13–60)
MCH RBC QN AUTO: 30.7 PG (ref 26–34)
MCHC RBC AUTO-ENTMCNC: 33.3 G/DL (ref 31–36)
MCV RBC AUTO: 92.2 FL (ref 80–100)
NITRITE UR QL STRIP.AUTO: NEGATIVE
PH UR STRIP.AUTO: 7 [PH] (ref 5–8)
PLATELET # BLD AUTO: 202 K/UL (ref 135–450)
PMV BLD AUTO: 9.9 FL (ref 5–10.5)
POTASSIUM SERPL-SCNC: 4.6 MMOL/L (ref 3.5–5.1)
PROT SERPL-MCNC: 6.8 G/DL (ref 6.4–8.2)
PROT UR STRIP.AUTO-MCNC: NEGATIVE MG/DL
RBC # BLD AUTO: 5.1 M/UL (ref 4–5.2)
RBC #/AREA URNS HPF: ABNORMAL /HPF (ref 0–4)
SODIUM SERPL-SCNC: 142 MMOL/L (ref 136–145)
SP GR UR STRIP.AUTO: 1.02 (ref 1–1.03)
UA DIPSTICK W REFLEX MICRO PNL UR: YES
URN SPEC COLLECT METH UR: ABNORMAL
UROBILINOGEN UR STRIP-ACNC: 0.2 E.U./DL
WBC # BLD AUTO: 5 K/UL (ref 4–11)
WBC #/AREA URNS HPF: ABNORMAL /HPF (ref 0–5)

## 2024-10-14 PROCEDURE — 81001 URINALYSIS AUTO W/SCOPE: CPT

## 2024-10-14 PROCEDURE — 36415 COLL VENOUS BLD VENIPUNCTURE: CPT

## 2024-10-14 PROCEDURE — 80053 COMPREHEN METABOLIC PANEL: CPT

## 2024-10-14 PROCEDURE — 83690 ASSAY OF LIPASE: CPT

## 2024-10-14 PROCEDURE — 85027 COMPLETE CBC AUTOMATED: CPT

## 2024-10-16 ENCOUNTER — HOSPITAL ENCOUNTER (OUTPATIENT)
Dept: CT IMAGING | Age: 74
Discharge: HOME | End: 2024-10-16
Attending: STUDENT IN AN ORGANIZED HEALTH CARE EDUCATION/TRAINING PROGRAM
Payer: MEDICARE

## 2024-10-16 DIAGNOSIS — R10.32 LEFT LOWER QUADRANT ABDOMINAL PAIN: ICD-10-CM

## 2024-10-16 PROCEDURE — 74177 CT ABD & PELVIS W/CONTRAST: CPT

## 2024-10-16 PROCEDURE — 6360000004 HC RX CONTRAST MEDICATION: Performed by: STUDENT IN AN ORGANIZED HEALTH CARE EDUCATION/TRAINING PROGRAM

## 2024-10-16 RX ORDER — DIATRIZOATE MEGLUMINE AND DIATRIZOATE SODIUM 660; 100 MG/ML; MG/ML
12 SOLUTION ORAL; RECTAL
Status: DISCONTINUED | OUTPATIENT
Start: 2024-10-16 | End: 2024-10-17 | Stop reason: HOSPADM

## 2024-10-16 RX ORDER — IOPAMIDOL 755 MG/ML
75 INJECTION, SOLUTION INTRAVASCULAR
Status: COMPLETED | OUTPATIENT
Start: 2024-10-16 | End: 2024-10-16

## 2024-10-16 RX ADMIN — IOPAMIDOL 75 ML: 755 INJECTION, SOLUTION INTRAVENOUS at 17:30

## 2024-10-16 RX ADMIN — DIATRIZOATE MEGLUMINE AND DIATRIZOATE SODIUM 12 ML: 660; 100 LIQUID ORAL; RECTAL at 17:31

## 2025-04-22 RX ORDER — EZETIMIBE 10 MG/1
10 TABLET ORAL DAILY
Qty: 90 TABLET | Refills: 0 | Status: SHIPPED | OUTPATIENT
Start: 2025-04-22 | End: 2025-07-21

## 2025-04-22 RX ORDER — PRAVASTATIN SODIUM 80 MG/1
TABLET ORAL
Qty: 90 TABLET | Refills: 0 | Status: SHIPPED | OUTPATIENT
Start: 2025-04-22

## 2025-04-22 NOTE — TELEPHONE ENCOUNTER
Last Office Visit  -  10/11/2025  Next Office Visit  -  na    Last Filled  -    Last UDS -  Contract -

## 2025-04-22 NOTE — TELEPHONE ENCOUNTER
Patient is requesting a rx for   pravastatin (PRAVACHOL) 80 MG tablet   ezetimibe (ZETIA) 10 MG tablet       Last seen 10/11/2024    Next visit Visit date not found

## 2025-07-17 RX ORDER — PRAVASTATIN SODIUM 80 MG/1
TABLET ORAL
Qty: 90 TABLET | Refills: 0 | Status: SHIPPED | OUTPATIENT
Start: 2025-07-17

## 2025-07-17 NOTE — TELEPHONE ENCOUNTER
Last Office Visit  -  10/11/2024  Next Office Visit  -  na    Last Filled  -    Last UDS -    Contract -

## 2025-07-25 ENCOUNTER — TELEPHONE (OUTPATIENT)
Dept: FAMILY MEDICINE CLINIC | Age: 75
End: 2025-07-25

## 2025-07-25 NOTE — TELEPHONE ENCOUNTER
----- Message from Mirna FRANCISCO sent at 7/25/2025 12:23 PM EDT -----  Regarding: [facility] Referral Request  [facility] Referral Request    Reason for referral request: Lab/Test Order    Specialist/Lab/Test patient is requesting (if known):    Specialist Phone Number (if applicable):    Additional Information : Patient is requesting an order for a lab work.  --------------------------------------------------------------------------------------------------------------------------    Relationship to Patient: Self     Call Back Information: OK to leave message on voicemail  Preferred Call Back Number: Phone [phone] (home)

## 2025-07-29 NOTE — TELEPHONE ENCOUNTER
Schedule patient for an AWV and she can declined to answer which questions that she wants to decline

## 2025-07-29 NOTE — TELEPHONE ENCOUNTER
Called and spoke to Pt. She does not want to do a AWV she does not want to answer the \"embarrassing question\" but will do a regular PE

## 2025-08-07 ENCOUNTER — OFFICE VISIT (OUTPATIENT)
Dept: FAMILY MEDICINE CLINIC | Age: 75
End: 2025-08-07
Payer: MEDICARE

## 2025-08-07 VITALS
SYSTOLIC BLOOD PRESSURE: 128 MMHG | WEIGHT: 198.8 LBS | OXYGEN SATURATION: 96 % | HEIGHT: 64 IN | BODY MASS INDEX: 33.94 KG/M2 | HEART RATE: 92 BPM | DIASTOLIC BLOOD PRESSURE: 70 MMHG

## 2025-08-07 DIAGNOSIS — E78.5 DYSLIPIDEMIA: ICD-10-CM

## 2025-08-07 DIAGNOSIS — Z00.00 MEDICARE ANNUAL WELLNESS VISIT, SUBSEQUENT: Primary | ICD-10-CM

## 2025-08-07 DIAGNOSIS — H61.23 BILATERAL IMPACTED CERUMEN: ICD-10-CM

## 2025-08-07 DIAGNOSIS — H92.03 EAR PAIN, BILATERAL: ICD-10-CM

## 2025-08-07 DIAGNOSIS — R79.9 ABNORMAL BLOOD CHEMISTRY: ICD-10-CM

## 2025-08-07 DIAGNOSIS — Z00.00 MEDICARE ANNUAL WELLNESS VISIT, SUBSEQUENT: ICD-10-CM

## 2025-08-07 PROBLEM — Z96.643 HISTORY OF BILATERAL HIP REPLACEMENTS: Status: ACTIVE | Noted: 2025-08-07

## 2025-08-07 PROBLEM — G45.1 TIA INVOLVING LEFT INTERNAL CAROTID ARTERY: Status: RESOLVED | Noted: 2020-01-07 | Resolved: 2025-08-07

## 2025-08-07 PROBLEM — M16.11 PRIMARY OSTEOARTHRITIS OF RIGHT HIP: Status: RESOLVED | Noted: 2022-05-03 | Resolved: 2025-08-07

## 2025-08-07 PROBLEM — R20.0 RIGHT ARM NUMBNESS: Status: RESOLVED | Noted: 2020-01-07 | Resolved: 2025-08-07

## 2025-08-07 PROBLEM — M16.11 PRIMARY LOCALIZED OSTEOARTHRITIS OF RIGHT HIP: Status: RESOLVED | Noted: 2022-10-03 | Resolved: 2025-08-07

## 2025-08-07 PROBLEM — R10.32 LEFT LOWER QUADRANT PAIN: Status: RESOLVED | Noted: 2019-04-04 | Resolved: 2025-08-07

## 2025-08-07 PROBLEM — M25.561 RIGHT KNEE PAIN: Status: RESOLVED | Noted: 2022-05-03 | Resolved: 2025-08-07

## 2025-08-07 PROBLEM — M25.512 LEFT SHOULDER PAIN: Status: RESOLVED | Noted: 2022-05-03 | Resolved: 2025-08-07

## 2025-08-07 PROCEDURE — G8417 CALC BMI ABV UP PARAM F/U: HCPCS | Performed by: STUDENT IN AN ORGANIZED HEALTH CARE EDUCATION/TRAINING PROGRAM

## 2025-08-07 PROCEDURE — G2211 COMPLEX E/M VISIT ADD ON: HCPCS | Performed by: STUDENT IN AN ORGANIZED HEALTH CARE EDUCATION/TRAINING PROGRAM

## 2025-08-07 PROCEDURE — G8399 PT W/DXA RESULTS DOCUMENT: HCPCS | Performed by: STUDENT IN AN ORGANIZED HEALTH CARE EDUCATION/TRAINING PROGRAM

## 2025-08-07 PROCEDURE — 1090F PRES/ABSN URINE INCON ASSESS: CPT | Performed by: STUDENT IN AN ORGANIZED HEALTH CARE EDUCATION/TRAINING PROGRAM

## 2025-08-07 PROCEDURE — G8427 DOCREV CUR MEDS BY ELIG CLIN: HCPCS | Performed by: STUDENT IN AN ORGANIZED HEALTH CARE EDUCATION/TRAINING PROGRAM

## 2025-08-07 PROCEDURE — 1160F RVW MEDS BY RX/DR IN RCRD: CPT | Performed by: STUDENT IN AN ORGANIZED HEALTH CARE EDUCATION/TRAINING PROGRAM

## 2025-08-07 PROCEDURE — 99214 OFFICE O/P EST MOD 30 MIN: CPT | Performed by: STUDENT IN AN ORGANIZED HEALTH CARE EDUCATION/TRAINING PROGRAM

## 2025-08-07 PROCEDURE — 1159F MED LIST DOCD IN RCRD: CPT | Performed by: STUDENT IN AN ORGANIZED HEALTH CARE EDUCATION/TRAINING PROGRAM

## 2025-08-07 PROCEDURE — 3017F COLORECTAL CA SCREEN DOC REV: CPT | Performed by: STUDENT IN AN ORGANIZED HEALTH CARE EDUCATION/TRAINING PROGRAM

## 2025-08-07 PROCEDURE — 1123F ACP DISCUSS/DSCN MKR DOCD: CPT | Performed by: STUDENT IN AN ORGANIZED HEALTH CARE EDUCATION/TRAINING PROGRAM

## 2025-08-07 PROCEDURE — 1036F TOBACCO NON-USER: CPT | Performed by: STUDENT IN AN ORGANIZED HEALTH CARE EDUCATION/TRAINING PROGRAM

## 2025-08-07 PROCEDURE — G0439 PPPS, SUBSEQ VISIT: HCPCS | Performed by: STUDENT IN AN ORGANIZED HEALTH CARE EDUCATION/TRAINING PROGRAM

## 2025-08-07 SDOH — ECONOMIC STABILITY: FOOD INSECURITY: WITHIN THE PAST 12 MONTHS, THE FOOD YOU BOUGHT JUST DIDN'T LAST AND YOU DIDN'T HAVE MONEY TO GET MORE.: NEVER TRUE

## 2025-08-07 SDOH — ECONOMIC STABILITY: FOOD INSECURITY: WITHIN THE PAST 12 MONTHS, YOU WORRIED THAT YOUR FOOD WOULD RUN OUT BEFORE YOU GOT MONEY TO BUY MORE.: NEVER TRUE

## 2025-08-07 ASSESSMENT — PATIENT HEALTH QUESTIONNAIRE - PHQ9
SUM OF ALL RESPONSES TO PHQ QUESTIONS 1-9: 0
2. FEELING DOWN, DEPRESSED OR HOPELESS: NOT AT ALL
SUM OF ALL RESPONSES TO PHQ QUESTIONS 1-9: 0
1. LITTLE INTEREST OR PLEASURE IN DOING THINGS: NOT AT ALL

## 2025-08-08 ENCOUNTER — TELEPHONE (OUTPATIENT)
Dept: FAMILY MEDICINE CLINIC | Age: 75
End: 2025-08-08

## 2025-08-08 LAB
ALBUMIN SERPL-MCNC: 4.4 G/DL (ref 3.4–5)
ALBUMIN/GLOB SERPL: 1.8 {RATIO} (ref 1.1–2.2)
ALP SERPL-CCNC: 65 U/L (ref 40–129)
ALT SERPL-CCNC: 39 U/L (ref 10–40)
ANION GAP SERPL CALCULATED.3IONS-SCNC: 9 MMOL/L (ref 3–16)
AST SERPL-CCNC: 33 U/L (ref 15–37)
BILIRUB SERPL-MCNC: 0.6 MG/DL (ref 0–1)
BUN SERPL-MCNC: 14 MG/DL (ref 7–20)
CALCIUM SERPL-MCNC: 9.8 MG/DL (ref 8.3–10.6)
CHLORIDE SERPL-SCNC: 105 MMOL/L (ref 99–110)
CHOLEST SERPL-MCNC: 167 MG/DL (ref 0–199)
CO2 SERPL-SCNC: 27 MMOL/L (ref 21–32)
CREAT SERPL-MCNC: 0.9 MG/DL (ref 0.6–1.2)
EST. AVERAGE GLUCOSE BLD GHB EST-MCNC: 122.6 MG/DL
GFR SERPLBLD CREATININE-BSD FMLA CKD-EPI: 67 ML/MIN/{1.73_M2}
GLUCOSE SERPL-MCNC: 111 MG/DL (ref 70–99)
HBA1C MFR BLD: 5.9 %
HDLC SERPL-MCNC: 54 MG/DL (ref 40–60)
LDL CHOLESTEROL: 89 MG/DL
POTASSIUM SERPL-SCNC: 4.9 MMOL/L (ref 3.5–5.1)
PROT SERPL-MCNC: 6.9 G/DL (ref 6.4–8.2)
SODIUM SERPL-SCNC: 141 MMOL/L (ref 136–145)
TRIGL SERPL-MCNC: 120 MG/DL (ref 0–150)
VLDLC SERPL CALC-MCNC: 24 MG/DL

## 2025-08-21 RX ORDER — EZETIMIBE 10 MG/1
10 TABLET ORAL DAILY
Qty: 90 TABLET | Refills: 3 | Status: SHIPPED | OUTPATIENT
Start: 2025-08-21 | End: 2025-11-19

## 2025-08-21 RX ORDER — PRAVASTATIN SODIUM 80 MG/1
TABLET ORAL
Qty: 90 TABLET | Refills: 3 | Status: SHIPPED | OUTPATIENT
Start: 2025-08-21

## 2025-08-29 ENCOUNTER — HOSPITAL ENCOUNTER (OUTPATIENT)
Dept: WOMENS IMAGING | Age: 75
Discharge: HOME | End: 2025-08-29
Payer: MEDICARE

## 2025-08-29 VITALS — HEIGHT: 64 IN | WEIGHT: 190 LBS | BODY MASS INDEX: 32.44 KG/M2

## 2025-08-29 DIAGNOSIS — Z12.31 OTHER SCREENING MAMMOGRAM: ICD-10-CM

## 2025-08-29 PROCEDURE — 77063 BREAST TOMOSYNTHESIS BI: CPT

## (undated) DEVICE — DRAPE SURG UTIL 26X15 IN W/ TAPE N INVASIVE MULT LAYR DISP

## (undated) DEVICE — ELECTRODE ECG MONITR FOAM TEAR DROP ADLT RED

## (undated) DEVICE — HOOD, PEEL-AWAY: Brand: FLYTE

## (undated) DEVICE — SYSTEM SKIN CLSR 22CM DERMBND PRINEO

## (undated) DEVICE — INTENDED FOR TISSUE SEPARATION, AND OTHER PROCEDURES THAT REQUIRE A SHARP SURGICAL BLADE TO PUNCTURE OR CUT.: Brand: BARD-PARKER ® STAINLESS STEEL BLADES

## (undated) DEVICE — SOLUTION,SALINE,IRRGATION,500ML,STRL: Brand: MEDLINE

## (undated) DEVICE — ENDO CARRY-ON PROCEDURE KIT INCLUDES SUCTION TUBING, LUBRICANT, GAUZE, BIOHAZARD STICKER, TRANSPORT PAD AND INTERCEPT BEDSIDE KIT.: Brand: ENDO CARRY-ON PROCEDURE KIT

## (undated) DEVICE — OPTIFOAM GENTLE SA, POSTOP, 4X10: Brand: MEDLINE

## (undated) DEVICE — SYRINGE 30ML MEDICAL LEUR LOCK TIP WO

## (undated) DEVICE — GLOVE ORTHO 7 1/2   MSG9475

## (undated) DEVICE — NEEDLE SPNL 20GA L3.5IN YEL HUB S STL REG WALL FIT STYL W/

## (undated) DEVICE — HANDPIECE SET WITH HIGH FLOW TIP AND SUCTION TUBE: Brand: INTERPULSE

## (undated) DEVICE — COVER LT HNDL PLAS RIG 2 PER PK

## (undated) DEVICE — GOWN SIRUS NONREIN XL W/TWL: Brand: MEDLINE INDUSTRIES, INC.

## (undated) DEVICE — SUTURE STRATAFIX SPRL SZ 1 L14IN ABSRB VLT L48CM CTX 1/2 SXPD2B405

## (undated) DEVICE — 3M™ IOBAN™ 2 ANTIMICROBIAL INCISE DRAPE 6650EZ: Brand: IOBAN™ 2

## (undated) DEVICE — Device

## (undated) DEVICE — 1010 S-DRAPE TOWEL DRAPE 10/BX: Brand: STERI-DRAPE™

## (undated) DEVICE — HOOD: Brand: FLYTE

## (undated) DEVICE — SUTURE PERMAHAND SZ 2-0 L30IN NONABSORBABLE BLK SILK W/O A305H

## (undated) DEVICE — BIPOLAR SEALER 23-112-1 AQM 6.0: Brand: AQUAMANTYS ®

## (undated) DEVICE — SUTURE VCRL SZ 2-0 L18IN ABSRB UD CT-1 L36MM 1/2 CIR J839D

## (undated) DEVICE — PENCIL SMK EVAC ALL IN 1 DSGN ENH VISIBILITY IMPROVED AIR

## (undated) DEVICE — ETHIBOND EXCEL SUT 30 INCHES75CM 2 GRN

## (undated) DEVICE — 3M™ STERI-DRAPE™ INCISE DRAPE 1050 (60CM X 45CM): Brand: STERI-DRAPE™

## (undated) DEVICE — SUTURE ETHBND EXCEL SZ 2 L30IN NONABSORBABLE GRN L40MM V-37 MX69G

## (undated) DEVICE — GLOVE SURG SZ 8 L12IN FNGR THK79MIL GRN LTX FREE

## (undated) DEVICE — DRAPE C ARM W46XL120IN XLN